# Patient Record
Sex: MALE | Race: WHITE | Employment: OTHER | URBAN - NONMETROPOLITAN AREA
[De-identification: names, ages, dates, MRNs, and addresses within clinical notes are randomized per-mention and may not be internally consistent; named-entity substitution may affect disease eponyms.]

---

## 2017-02-13 DIAGNOSIS — I10 ESSENTIAL HYPERTENSION: ICD-10-CM

## 2017-02-13 RX ORDER — LISINOPRIL 10 MG/1
10 TABLET ORAL DAILY
Qty: 30 TABLET | Refills: 3 | Status: SHIPPED | OUTPATIENT
Start: 2017-02-13 | End: 2017-05-30 | Stop reason: SDUPTHER

## 2017-03-21 ENCOUNTER — OFFICE VISIT (OUTPATIENT)
Dept: CARDIOLOGY | Age: 37
End: 2017-03-21
Payer: MEDICAID

## 2017-03-21 VITALS
HEART RATE: 82 BPM | WEIGHT: 196 LBS | SYSTOLIC BLOOD PRESSURE: 110 MMHG | HEIGHT: 67 IN | DIASTOLIC BLOOD PRESSURE: 72 MMHG | BODY MASS INDEX: 30.76 KG/M2

## 2017-03-21 DIAGNOSIS — I10 ESSENTIAL HYPERTENSION: Primary | ICD-10-CM

## 2017-03-21 DIAGNOSIS — Z72.0 TOBACCO ABUSE: ICD-10-CM

## 2017-03-21 DIAGNOSIS — Z82.49 FAMILY HISTORY OF EARLY CAD: ICD-10-CM

## 2017-03-21 PROCEDURE — 99212 OFFICE O/P EST SF 10 MIN: CPT | Performed by: INTERNAL MEDICINE

## 2017-04-19 ENCOUNTER — HOSPITAL ENCOUNTER (INPATIENT)
Age: 37
LOS: 4 days | Discharge: HOME | DRG: 885 | End: 2017-04-24
Attending: EMERGENCY MEDICINE | Admitting: PSYCHIATRY & NEUROLOGY
Payer: MEDICAID

## 2017-04-19 DIAGNOSIS — R45.851 SUICIDAL IDEATIONS: Primary | ICD-10-CM

## 2017-04-19 DIAGNOSIS — R45.850 HOMICIDAL IDEATIONS: ICD-10-CM

## 2017-04-19 LAB
ACETAMINOPHEN LEVEL: <15 UG/ML
ALBUMIN SERPL-MCNC: 4.3 G/DL (ref 3.5–5.2)
ALP BLD-CCNC: 109 U/L (ref 40–130)
ALT SERPL-CCNC: 26 U/L (ref 5–41)
AMPHETAMINE SCREEN, URINE: NEGATIVE
ANION GAP SERPL CALCULATED.3IONS-SCNC: 15 MMOL/L (ref 7–19)
AST SERPL-CCNC: 32 U/L (ref 5–40)
BACTERIA: NEGATIVE /HPF
BARBITURATE SCREEN URINE: NEGATIVE
BENZODIAZEPINE SCREEN, URINE: POSITIVE
BILIRUB SERPL-MCNC: <0.2 MG/DL (ref 0.2–1.2)
BILIRUBIN URINE: NEGATIVE
BLOOD, URINE: ABNORMAL
BUN BLDV-MCNC: 17 MG/DL (ref 6–20)
CALCIUM SERPL-MCNC: 8.9 MG/DL (ref 8.6–10)
CANNABINOID SCREEN URINE: NEGATIVE
CHLORIDE BLD-SCNC: 105 MMOL/L (ref 98–111)
CLARITY: CLEAR
CO2: 22 MMOL/L (ref 22–29)
COCAINE METABOLITE SCREEN URINE: NEGATIVE
COLOR: YELLOW
CREAT SERPL-MCNC: 1 MG/DL (ref 0.5–1.2)
EPITHELIAL CELLS, UA: 1 /HPF (ref 0–5)
ETHANOL: <10 MG/DL (ref 0–0.08)
GFR NON-AFRICAN AMERICAN: >60
GLOBULIN: 2.7 G/DL
GLUCOSE BLD-MCNC: 111 MG/DL (ref 74–109)
GLUCOSE URINE: NEGATIVE MG/DL
HCT VFR BLD CALC: 44.1 % (ref 42–52)
HEMOGLOBIN: 14.8 G/DL (ref 14–18)
HYALINE CASTS: 2 /HPF (ref 0–8)
KETONES, URINE: NEGATIVE MG/DL
LEUKOCYTE ESTERASE, URINE: NEGATIVE
Lab: ABNORMAL
MCH RBC QN AUTO: 33.3 PG (ref 27–31)
MCHC RBC AUTO-ENTMCNC: 33.6 G/DL (ref 33–37)
MCV RBC AUTO: 99.1 FL (ref 80–94)
NITRITE, URINE: NEGATIVE
OPIATE SCREEN URINE: NEGATIVE
PDW BLD-RTO: 13.5 % (ref 11.5–14.5)
PH UA: 5.5
PLATELET # BLD: 339 K/UL (ref 130–400)
PMV BLD AUTO: 10.3 FL (ref 7.4–10.4)
POTASSIUM SERPL-SCNC: 4.3 MMOL/L (ref 3.5–5)
PROTEIN UA: NEGATIVE MG/DL
RBC # BLD: 4.45 M/UL (ref 4.7–6.1)
RBC UA: 2 /HPF (ref 0–4)
SALICYLATE, SERUM: <3 MG/DL (ref 3–10)
SODIUM BLD-SCNC: 142 MMOL/L (ref 136–145)
SPECIFIC GRAVITY UA: 1.02
TOTAL PROTEIN: 7 G/DL (ref 6.6–8.7)
UROBILINOGEN, URINE: 0.2 E.U./DL
WBC # BLD: 9.8 K/UL (ref 4.8–10.8)
WBC UA: 1 /HPF (ref 0–5)

## 2017-04-19 PROCEDURE — G0480 DRUG TEST DEF 1-7 CLASSES: HCPCS

## 2017-04-19 PROCEDURE — 85027 COMPLETE CBC AUTOMATED: CPT

## 2017-04-19 PROCEDURE — 36415 COLL VENOUS BLD VENIPUNCTURE: CPT

## 2017-04-19 PROCEDURE — 81001 URINALYSIS AUTO W/SCOPE: CPT

## 2017-04-19 PROCEDURE — 80307 DRUG TEST PRSMV CHEM ANLYZR: CPT

## 2017-04-19 PROCEDURE — 80053 COMPREHEN METABOLIC PANEL: CPT

## 2017-04-19 PROCEDURE — 99285 EMERGENCY DEPT VISIT HI MDM: CPT

## 2017-04-20 PROBLEM — F17.200 NICOTINE DEPENDENCE: Status: ACTIVE | Noted: 2017-04-20

## 2017-04-20 PROBLEM — F33.1 MODERATE EPISODE OF RECURRENT MAJOR DEPRESSIVE DISORDER (HCC): Status: ACTIVE | Noted: 2017-04-20

## 2017-04-20 PROCEDURE — 6370000000 HC RX 637 (ALT 250 FOR IP): Performed by: FAMILY MEDICINE

## 2017-04-20 PROCEDURE — 1240000000 HC EMOTIONAL WELLNESS R&B

## 2017-04-20 PROCEDURE — 90792 PSYCH DIAG EVAL W/MED SRVCS: CPT | Performed by: NURSE PRACTITIONER

## 2017-04-20 PROCEDURE — 99284 EMERGENCY DEPT VISIT MOD MDM: CPT | Performed by: EMERGENCY MEDICINE

## 2017-04-20 PROCEDURE — 6370000000 HC RX 637 (ALT 250 FOR IP): Performed by: PSYCHIATRY & NEUROLOGY

## 2017-04-20 PROCEDURE — 6370000000 HC RX 637 (ALT 250 FOR IP): Performed by: NURSE PRACTITIONER

## 2017-04-20 RX ORDER — LANOLIN ALCOHOL/MO/W.PET/CERES
6 CREAM (GRAM) TOPICAL NIGHTLY PRN
Status: DISCONTINUED | OUTPATIENT
Start: 2017-04-20 | End: 2017-04-24 | Stop reason: HOSPADM

## 2017-04-20 RX ORDER — DICYCLOMINE HYDROCHLORIDE 10 MG/1
10 CAPSULE ORAL
Status: DISCONTINUED | OUTPATIENT
Start: 2017-04-20 | End: 2017-04-24 | Stop reason: HOSPADM

## 2017-04-20 RX ORDER — NICOTINE 21 MG/24HR
1 PATCH, TRANSDERMAL 24 HOURS TRANSDERMAL DAILY
Status: DISCONTINUED | OUTPATIENT
Start: 2017-04-20 | End: 2017-04-24 | Stop reason: HOSPADM

## 2017-04-20 RX ORDER — TRAZODONE HYDROCHLORIDE 100 MG/1
100 TABLET ORAL ONCE
Status: COMPLETED | OUTPATIENT
Start: 2017-04-20 | End: 2017-04-20

## 2017-04-20 RX ORDER — LISINOPRIL 10 MG/1
10 TABLET ORAL DAILY
Status: DISCONTINUED | OUTPATIENT
Start: 2017-04-20 | End: 2017-04-24 | Stop reason: HOSPADM

## 2017-04-20 RX ORDER — GABAPENTIN 400 MG/1
400 CAPSULE ORAL 3 TIMES DAILY
Status: DISCONTINUED | OUTPATIENT
Start: 2017-04-20 | End: 2017-04-24 | Stop reason: HOSPADM

## 2017-04-20 RX ORDER — GABAPENTIN 100 MG/1
100 CAPSULE ORAL ONCE
Status: COMPLETED | OUTPATIENT
Start: 2017-04-20 | End: 2017-04-20

## 2017-04-20 RX ORDER — ESCITALOPRAM OXALATE 5 MG/1
5 TABLET ORAL DAILY
Status: DISCONTINUED | OUTPATIENT
Start: 2017-04-20 | End: 2017-04-21

## 2017-04-20 RX ORDER — HYDROXYZINE PAMOATE 25 MG/1
25 CAPSULE ORAL 3 TIMES DAILY PRN
Status: DISCONTINUED | OUTPATIENT
Start: 2017-04-20 | End: 2017-04-24 | Stop reason: HOSPADM

## 2017-04-20 RX ORDER — TRAZODONE HYDROCHLORIDE 50 MG/1
50 TABLET ORAL NIGHTLY
Status: DISCONTINUED | OUTPATIENT
Start: 2017-04-20 | End: 2017-04-20

## 2017-04-20 RX ADMIN — GABAPENTIN 400 MG: 400 CAPSULE ORAL at 10:50

## 2017-04-20 RX ADMIN — GABAPENTIN 400 MG: 400 CAPSULE ORAL at 21:30

## 2017-04-20 RX ADMIN — DICYCLOMINE HYDROCHLORIDE 10 MG: 10 CAPSULE ORAL at 15:42

## 2017-04-20 RX ADMIN — LISINOPRIL 10 MG: 10 TABLET ORAL at 10:50

## 2017-04-20 RX ADMIN — GABAPENTIN 100 MG: 100 CAPSULE ORAL at 02:54

## 2017-04-20 RX ADMIN — ESCITALOPRAM OXALATE 5 MG: 5 TABLET, FILM COATED ORAL at 10:50

## 2017-04-20 RX ADMIN — TRAZODONE HYDROCHLORIDE 100 MG: 100 TABLET ORAL at 02:54

## 2017-04-20 RX ADMIN — DICYCLOMINE HYDROCHLORIDE 10 MG: 10 CAPSULE ORAL at 10:50

## 2017-04-20 RX ADMIN — GABAPENTIN 400 MG: 400 CAPSULE ORAL at 14:12

## 2017-04-20 RX ADMIN — MELATONIN 3 MG ORAL TABLET 6 MG: 3 TABLET ORAL at 21:30

## 2017-04-20 RX ADMIN — HYDROXYZINE PAMOATE 25 MG: 25 CAPSULE ORAL at 10:50

## 2017-04-20 ASSESSMENT — ENCOUNTER SYMPTOMS
CONSTIPATION: 0
ABDOMINAL PAIN: 0
ABDOMINAL DISTENTION: 0
CHEST TIGHTNESS: 0
COLOR CHANGE: 0
PHOTOPHOBIA: 0
SORE THROAT: 0
WHEEZING: 0
EYE PAIN: 0
VOMITING: 0
SHORTNESS OF BREATH: 0
BACK PAIN: 0
DIARRHEA: 0
NAUSEA: 0
COUGH: 0
TROUBLE SWALLOWING: 0
EYE REDNESS: 0
SINUS PRESSURE: 0

## 2017-04-20 ASSESSMENT — PATIENT HEALTH QUESTIONNAIRE - PHQ9: SUM OF ALL RESPONSES TO PHQ QUESTIONS 1-9: 9

## 2017-04-20 ASSESSMENT — SLEEP AND FATIGUE QUESTIONNAIRES
DO YOU HAVE DIFFICULTY SLEEPING: NO
DO YOU USE A SLEEP AID: NO

## 2017-04-20 ASSESSMENT — LIFESTYLE VARIABLES: HISTORY_ALCOHOL_USE: YES

## 2017-04-21 LAB
HBA1C MFR BLD: 5.2 %
TSH SERPL DL<=0.05 MIU/L-ACNC: 1.2 UIU/ML (ref 0.27–4.2)
VITAMIN B-12: 273 PG/ML (ref 211–946)
VITAMIN D 25-HYDROXY: 19.2 NG/ML

## 2017-04-21 PROCEDURE — 36415 COLL VENOUS BLD VENIPUNCTURE: CPT

## 2017-04-21 PROCEDURE — 99231 SBSQ HOSP IP/OBS SF/LOW 25: CPT | Performed by: NURSE PRACTITIONER

## 2017-04-21 PROCEDURE — 82607 VITAMIN B-12: CPT

## 2017-04-21 PROCEDURE — 82306 VITAMIN D 25 HYDROXY: CPT

## 2017-04-21 PROCEDURE — 6370000000 HC RX 637 (ALT 250 FOR IP): Performed by: FAMILY MEDICINE

## 2017-04-21 PROCEDURE — 6370000000 HC RX 637 (ALT 250 FOR IP): Performed by: NURSE PRACTITIONER

## 2017-04-21 PROCEDURE — 1240000000 HC EMOTIONAL WELLNESS R&B

## 2017-04-21 PROCEDURE — 83036 HEMOGLOBIN GLYCOSYLATED A1C: CPT

## 2017-04-21 PROCEDURE — 84443 ASSAY THYROID STIM HORMONE: CPT

## 2017-04-21 RX ORDER — CHOLECALCIFEROL (VITAMIN D3) 125 MCG
500 CAPSULE ORAL DAILY
Status: DISCONTINUED | OUTPATIENT
Start: 2017-04-22 | End: 2017-04-24 | Stop reason: HOSPADM

## 2017-04-21 RX ORDER — IBUPROFEN 400 MG/1
400 TABLET ORAL EVERY 6 HOURS PRN
Status: DISCONTINUED | OUTPATIENT
Start: 2017-04-21 | End: 2017-04-24 | Stop reason: HOSPADM

## 2017-04-21 RX ORDER — ERGOCALCIFEROL 1.25 MG/1
50000 CAPSULE ORAL WEEKLY
Status: DISCONTINUED | OUTPATIENT
Start: 2017-04-22 | End: 2017-04-24 | Stop reason: HOSPADM

## 2017-04-21 RX ORDER — ESCITALOPRAM OXALATE 10 MG/1
10 TABLET ORAL DAILY
Status: DISCONTINUED | OUTPATIENT
Start: 2017-04-22 | End: 2017-04-24 | Stop reason: HOSPADM

## 2017-04-21 RX ORDER — LIDOCAINE 50 MG/G
1 PATCH TOPICAL DAILY
Status: DISCONTINUED | OUTPATIENT
Start: 2017-04-21 | End: 2017-04-24 | Stop reason: HOSPADM

## 2017-04-21 RX ADMIN — IBUPROFEN 400 MG: 400 TABLET, FILM COATED ORAL at 21:21

## 2017-04-21 RX ADMIN — GABAPENTIN 400 MG: 400 CAPSULE ORAL at 09:04

## 2017-04-21 RX ADMIN — IBUPROFEN 400 MG: 400 TABLET, FILM COATED ORAL at 13:32

## 2017-04-21 RX ADMIN — DICYCLOMINE HYDROCHLORIDE 10 MG: 10 CAPSULE ORAL at 06:47

## 2017-04-21 RX ADMIN — DICYCLOMINE HYDROCHLORIDE 10 MG: 10 CAPSULE ORAL at 11:03

## 2017-04-21 RX ADMIN — GABAPENTIN 400 MG: 400 CAPSULE ORAL at 21:09

## 2017-04-21 RX ADMIN — ESCITALOPRAM OXALATE 5 MG: 5 TABLET, FILM COATED ORAL at 09:04

## 2017-04-21 RX ADMIN — MELATONIN 3 MG ORAL TABLET 6 MG: 3 TABLET ORAL at 21:09

## 2017-04-21 RX ADMIN — DICYCLOMINE HYDROCHLORIDE 10 MG: 10 CAPSULE ORAL at 16:11

## 2017-04-21 RX ADMIN — LISINOPRIL 10 MG: 10 TABLET ORAL at 09:04

## 2017-04-21 RX ADMIN — GABAPENTIN 400 MG: 400 CAPSULE ORAL at 12:52

## 2017-04-21 ASSESSMENT — PAIN SCALES - GENERAL
PAINLEVEL_OUTOF10: 6
PAINLEVEL_OUTOF10: 10

## 2017-04-22 PROCEDURE — 1240000000 HC EMOTIONAL WELLNESS R&B

## 2017-04-22 PROCEDURE — 99221 1ST HOSP IP/OBS SF/LOW 40: CPT | Performed by: PSYCHIATRY & NEUROLOGY

## 2017-04-22 PROCEDURE — 6370000000 HC RX 637 (ALT 250 FOR IP): Performed by: NURSE PRACTITIONER

## 2017-04-22 PROCEDURE — 6370000000 HC RX 637 (ALT 250 FOR IP): Performed by: FAMILY MEDICINE

## 2017-04-22 RX ADMIN — DICYCLOMINE HYDROCHLORIDE 10 MG: 10 CAPSULE ORAL at 12:12

## 2017-04-22 RX ADMIN — IBUPROFEN 400 MG: 400 TABLET, FILM COATED ORAL at 12:12

## 2017-04-22 RX ADMIN — GABAPENTIN 400 MG: 400 CAPSULE ORAL at 09:57

## 2017-04-22 RX ADMIN — MELATONIN 3 MG ORAL TABLET 6 MG: 3 TABLET ORAL at 21:24

## 2017-04-22 RX ADMIN — DICYCLOMINE HYDROCHLORIDE 10 MG: 10 CAPSULE ORAL at 16:35

## 2017-04-22 RX ADMIN — GABAPENTIN 400 MG: 400 CAPSULE ORAL at 21:24

## 2017-04-22 RX ADMIN — DICYCLOMINE HYDROCHLORIDE 10 MG: 10 CAPSULE ORAL at 06:22

## 2017-04-22 RX ADMIN — LISINOPRIL 10 MG: 10 TABLET ORAL at 09:58

## 2017-04-22 RX ADMIN — CYANOCOBALAMIN TAB 500 MCG 500 MCG: 500 TAB at 09:58

## 2017-04-22 RX ADMIN — ESCITALOPRAM OXALATE 10 MG: 10 TABLET ORAL at 09:58

## 2017-04-22 RX ADMIN — GABAPENTIN 400 MG: 400 CAPSULE ORAL at 13:19

## 2017-04-22 RX ADMIN — ERGOCALCIFEROL 50000 UNITS: 1.25 CAPSULE ORAL at 09:58

## 2017-04-22 RX ADMIN — IBUPROFEN 400 MG: 400 TABLET, FILM COATED ORAL at 21:28

## 2017-04-22 ASSESSMENT — PAIN SCALES - GENERAL
PAINLEVEL_OUTOF10: 8
PAINLEVEL_OUTOF10: 9

## 2017-04-23 PROCEDURE — 1240000000 HC EMOTIONAL WELLNESS R&B

## 2017-04-23 PROCEDURE — 6370000000 HC RX 637 (ALT 250 FOR IP): Performed by: NURSE PRACTITIONER

## 2017-04-23 PROCEDURE — 6370000000 HC RX 637 (ALT 250 FOR IP): Performed by: FAMILY MEDICINE

## 2017-04-23 RX ADMIN — ESCITALOPRAM OXALATE 10 MG: 10 TABLET ORAL at 09:42

## 2017-04-23 RX ADMIN — DICYCLOMINE HYDROCHLORIDE 10 MG: 10 CAPSULE ORAL at 11:53

## 2017-04-23 RX ADMIN — DICYCLOMINE HYDROCHLORIDE 10 MG: 10 CAPSULE ORAL at 07:26

## 2017-04-23 RX ADMIN — LISINOPRIL 10 MG: 10 TABLET ORAL at 09:42

## 2017-04-23 RX ADMIN — GABAPENTIN 400 MG: 400 CAPSULE ORAL at 14:11

## 2017-04-23 RX ADMIN — GABAPENTIN 400 MG: 400 CAPSULE ORAL at 09:44

## 2017-04-23 RX ADMIN — GABAPENTIN 400 MG: 400 CAPSULE ORAL at 21:42

## 2017-04-23 RX ADMIN — IBUPROFEN 400 MG: 400 TABLET, FILM COATED ORAL at 10:11

## 2017-04-23 RX ADMIN — IBUPROFEN 400 MG: 400 TABLET, FILM COATED ORAL at 21:42

## 2017-04-23 RX ADMIN — CYANOCOBALAMIN TAB 500 MCG 500 MCG: 500 TAB at 09:43

## 2017-04-23 RX ADMIN — MELATONIN 3 MG ORAL TABLET 6 MG: 3 TABLET ORAL at 21:42

## 2017-04-23 RX ADMIN — DICYCLOMINE HYDROCHLORIDE 10 MG: 10 CAPSULE ORAL at 16:05

## 2017-04-23 ASSESSMENT — PAIN SCALES - GENERAL
PAINLEVEL_OUTOF10: 5
PAINLEVEL_OUTOF10: 7

## 2017-04-24 VITALS
RESPIRATION RATE: 18 BRPM | HEIGHT: 67 IN | DIASTOLIC BLOOD PRESSURE: 89 MMHG | SYSTOLIC BLOOD PRESSURE: 132 MMHG | OXYGEN SATURATION: 100 % | WEIGHT: 195 LBS | TEMPERATURE: 98 F | HEART RATE: 78 BPM | BODY MASS INDEX: 30.61 KG/M2

## 2017-04-24 PROCEDURE — 99238 HOSP IP/OBS DSCHRG MGMT 30/<: CPT | Performed by: NURSE PRACTITIONER

## 2017-04-24 PROCEDURE — 6370000000 HC RX 637 (ALT 250 FOR IP): Performed by: FAMILY MEDICINE

## 2017-04-24 PROCEDURE — 6370000000 HC RX 637 (ALT 250 FOR IP): Performed by: NURSE PRACTITIONER

## 2017-04-24 PROCEDURE — 5130000000 HC BRIDGE APPOINTMENT

## 2017-04-24 RX ORDER — ERGOCALCIFEROL (VITAMIN D2) 1250 MCG
50000 CAPSULE ORAL WEEKLY
Qty: 10 CAPSULE | Refills: 0 | Status: SHIPPED | OUTPATIENT
Start: 2017-04-24 | End: 2018-03-07 | Stop reason: SDUPTHER

## 2017-04-24 RX ORDER — ESCITALOPRAM OXALATE 10 MG/1
10 TABLET ORAL DAILY
Qty: 30 TABLET | Refills: 0 | Status: SHIPPED | OUTPATIENT
Start: 2017-04-24 | End: 2018-04-09

## 2017-04-24 RX ORDER — HYDROXYZINE PAMOATE 25 MG/1
25 CAPSULE ORAL 3 TIMES DAILY PRN
Qty: 42 CAPSULE | Refills: 0 | Status: SHIPPED | OUTPATIENT
Start: 2017-04-24 | End: 2017-05-08

## 2017-04-24 RX ADMIN — ESCITALOPRAM OXALATE 10 MG: 10 TABLET ORAL at 08:39

## 2017-04-24 RX ADMIN — LISINOPRIL 10 MG: 10 TABLET ORAL at 08:39

## 2017-04-24 RX ADMIN — DICYCLOMINE HYDROCHLORIDE 10 MG: 10 CAPSULE ORAL at 13:01

## 2017-04-24 RX ADMIN — CYANOCOBALAMIN TAB 500 MCG 500 MCG: 500 TAB at 08:40

## 2017-04-24 RX ADMIN — GABAPENTIN 400 MG: 400 CAPSULE ORAL at 13:02

## 2017-04-24 RX ADMIN — DICYCLOMINE HYDROCHLORIDE 10 MG: 10 CAPSULE ORAL at 07:22

## 2017-04-24 RX ADMIN — GABAPENTIN 400 MG: 400 CAPSULE ORAL at 08:40

## 2017-05-30 DIAGNOSIS — I10 ESSENTIAL HYPERTENSION: ICD-10-CM

## 2017-05-31 RX ORDER — LISINOPRIL 10 MG/1
TABLET ORAL
Qty: 30 TABLET | Refills: 3 | Status: SHIPPED | OUTPATIENT
Start: 2017-05-31 | End: 2017-10-06 | Stop reason: SDUPTHER

## 2017-07-01 ENCOUNTER — APPOINTMENT (OUTPATIENT)
Dept: GENERAL RADIOLOGY | Age: 37
End: 2017-07-01
Payer: MEDICAID

## 2017-07-01 ENCOUNTER — HOSPITAL ENCOUNTER (EMERGENCY)
Age: 37
Discharge: HOME | End: 2017-07-01
Payer: MEDICAID

## 2017-07-01 VITALS
OXYGEN SATURATION: 98 % | DIASTOLIC BLOOD PRESSURE: 83 MMHG | BODY MASS INDEX: 30.05 KG/M2 | TEMPERATURE: 98.8 F | HEART RATE: 94 BPM | HEIGHT: 66 IN | RESPIRATION RATE: 18 BRPM | SYSTOLIC BLOOD PRESSURE: 142 MMHG | WEIGHT: 187 LBS

## 2017-07-01 DIAGNOSIS — S41.111A LACERATION OF UPPER LIMB, RIGHT, INITIAL ENCOUNTER: Primary | ICD-10-CM

## 2017-07-01 PROCEDURE — 90471 IMMUNIZATION ADMIN: CPT | Performed by: NURSE PRACTITIONER

## 2017-07-01 PROCEDURE — 6360000002 HC RX W HCPCS: Performed by: NURSE PRACTITIONER

## 2017-07-01 PROCEDURE — 73080 X-RAY EXAM OF ELBOW: CPT

## 2017-07-01 PROCEDURE — 12006 RPR S/N/A/GEN/TRK20.1-30.0CM: CPT | Performed by: NURSE PRACTITIONER

## 2017-07-01 PROCEDURE — 12006 RPR S/N/A/GEN/TRK20.1-30.0CM: CPT

## 2017-07-01 PROCEDURE — 99282 EMERGENCY DEPT VISIT SF MDM: CPT

## 2017-07-01 PROCEDURE — 90715 TDAP VACCINE 7 YRS/> IM: CPT | Performed by: NURSE PRACTITIONER

## 2017-07-01 RX ORDER — HYDROCODONE BITARTRATE AND ACETAMINOPHEN 5; 325 MG/1; MG/1
1 TABLET ORAL EVERY 6 HOURS PRN
COMMUNITY
End: 2019-01-21 | Stop reason: SDUPTHER

## 2017-07-01 RX ORDER — LIDOCAINE HYDROCHLORIDE 10 MG/ML
5 INJECTION, SOLUTION INFILTRATION; PERINEURAL ONCE
Status: DISCONTINUED | OUTPATIENT
Start: 2017-07-01 | End: 2017-07-01 | Stop reason: HOSPADM

## 2017-07-01 RX ADMIN — TETANUS TOXOID, REDUCED DIPHTHERIA TOXOID AND ACELLULAR PERTUSSIS VACCINE, ADSORBED 0.5 ML: 5; 2.5; 8; 8; 2.5 SUSPENSION INTRAMUSCULAR at 17:49

## 2017-07-01 ASSESSMENT — PAIN SCALES - GENERAL: PAINLEVEL_OUTOF10: 10

## 2017-07-01 ASSESSMENT — PAIN DESCRIPTION - ORIENTATION: ORIENTATION: RIGHT

## 2017-07-01 ASSESSMENT — PAIN DESCRIPTION - PAIN TYPE: TYPE: ACUTE PAIN

## 2017-07-01 ASSESSMENT — PAIN DESCRIPTION - LOCATION: LOCATION: ELBOW

## 2017-07-19 ENCOUNTER — APPOINTMENT (OUTPATIENT)
Dept: GENERAL RADIOLOGY | Age: 37
End: 2017-07-19
Payer: MEDICAID

## 2017-07-19 ENCOUNTER — HOSPITAL ENCOUNTER (EMERGENCY)
Age: 37
Discharge: HOME | End: 2017-07-19
Payer: MEDICAID

## 2017-07-19 VITALS
SYSTOLIC BLOOD PRESSURE: 134 MMHG | WEIGHT: 186 LBS | HEART RATE: 90 BPM | BODY MASS INDEX: 29.89 KG/M2 | RESPIRATION RATE: 20 BRPM | DIASTOLIC BLOOD PRESSURE: 83 MMHG | TEMPERATURE: 96.4 F | HEIGHT: 66 IN | OXYGEN SATURATION: 98 %

## 2017-07-19 DIAGNOSIS — M54.6 ACUTE BILATERAL THORACIC BACK PAIN: Primary | ICD-10-CM
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
## 2017-07-19 PROCEDURE — 96372 THER/PROPH/DIAG INJ SC/IM: CPT

## 2017-07-19 PROCEDURE — 72072 X-RAY EXAM THORAC SPINE 3VWS: CPT

## 2017-07-19 PROCEDURE — 99283 EMERGENCY DEPT VISIT LOW MDM: CPT

## 2017-07-19 PROCEDURE — 6370000000 HC RX 637 (ALT 250 FOR IP): Performed by: NURSE PRACTITIONER

## 2017-07-19 PROCEDURE — 99282 EMERGENCY DEPT VISIT SF MDM: CPT | Performed by: NURSE PRACTITIONER

## 2017-07-19 PROCEDURE — 6360000002 HC RX W HCPCS: Performed by: NURSE PRACTITIONER

## 2017-07-19 RX ORDER — HYDROCODONE BITARTRATE AND ACETAMINOPHEN 7.5; 325 MG/1; MG/1
1 TABLET ORAL ONCE
Status: COMPLETED | OUTPATIENT
Start: 2017-07-19 | End: 2017-07-19

## 2017-07-19 RX ORDER — ORPHENADRINE CITRATE 30 MG/ML
60 INJECTION INTRAMUSCULAR; INTRAVENOUS ONCE
Status: COMPLETED | OUTPATIENT
Start: 2017-07-19 | End: 2017-07-19

## 2017-07-19 RX ADMIN — ORPHENADRINE CITRATE 60 MG: 30 INJECTION INTRAMUSCULAR; INTRAVENOUS at 15:48

## 2017-07-19 RX ADMIN — HYDROCODONE BITARTRATE AND ACETAMINOPHEN 1 TABLET: 7.5; 325 TABLET ORAL at 15:48

## 2017-07-19 ASSESSMENT — ENCOUNTER SYMPTOMS: BACK PAIN: 1

## 2017-07-19 ASSESSMENT — PAIN SCALES - GENERAL
PAINLEVEL_OUTOF10: 10
PAINLEVEL_OUTOF10: 10

## 2017-08-24 ENCOUNTER — HOSPITAL ENCOUNTER (EMERGENCY)
Facility: HOSPITAL | Age: 37
Discharge: HOME | End: 2017-08-24
Attending: FAMILY MEDICINE | Admitting: FAMILY MEDICINE

## 2017-08-24 VITALS
SYSTOLIC BLOOD PRESSURE: 147 MMHG | DIASTOLIC BLOOD PRESSURE: 90 MMHG | WEIGHT: 191 LBS | BODY MASS INDEX: 30.7 KG/M2 | OXYGEN SATURATION: 95 % | RESPIRATION RATE: 18 BRPM | HEART RATE: 109 BPM | HEIGHT: 66 IN | TEMPERATURE: 99.2 F

## 2017-08-24 DIAGNOSIS — M70.31 BURSITIS OF RIGHT ELBOW: Primary | ICD-10-CM

## 2017-08-24 PROCEDURE — 99282 EMERGENCY DEPT VISIT SF MDM: CPT

## 2017-08-24 RX ORDER — DOXYCYCLINE 100 MG/1
100 CAPSULE ORAL 2 TIMES DAILY
Qty: 14 CAPSULE | Refills: 0 | Status: SHIPPED | OUTPATIENT
Start: 2017-08-24 | End: 2017-08-31

## 2017-08-24 RX ORDER — METHYLPREDNISOLONE 4 MG/1
TABLET ORAL
Qty: 21 TABLET | Refills: 0 | OUTPATIENT
Start: 2017-08-24 | End: 2019-03-05 | Stop reason: HOSPADM

## 2017-08-24 RX ORDER — ONDANSETRON 4 MG/1
4 TABLET, ORALLY DISINTEGRATING ORAL 4 TIMES DAILY PRN
Qty: 20 TABLET | Refills: 0 | Status: SHIPPED | OUTPATIENT
Start: 2017-08-24 | End: 2020-03-10

## 2017-08-24 NOTE — ED PROVIDER NOTES
Subjective   Patient is a 37 y.o. male presenting with upper extremity pain.   Upper Extremity Issue   Location:  Elbow  Injury: yes    Time since incident:  2 months  Mechanism of injury comment:  Lac  Pain details:     Quality:  Dull    Severity:  Mild    Onset quality:  Unable to specify    Duration:  7 days    Timing:  Intermittent    Progression:  Worsening  Dislocation: no    Foreign body present: possibly glass.  Tetanus status:  Up to date  Prior injury to area:  Yes  Relieved by:  None tried  Worsened by:  Movement  Associated symptoms: no back pain, no fatigue, no fever and no neck pain        Review of Systems   Constitutional: Negative for activity change, appetite change, chills, diaphoresis, fatigue and fever.   HENT: Negative for congestion, ear pain, nosebleeds, postnasal drip and sinus pressure.    Eyes: Negative for photophobia and pain.   Respiratory: Negative for cough, chest tightness, shortness of breath and wheezing.    Cardiovascular: Negative for chest pain.   Gastrointestinal: Negative for abdominal pain, blood in stool, diarrhea, nausea and vomiting.   Endocrine: Negative for cold intolerance and heat intolerance.   Genitourinary: Negative for difficulty urinating, dysuria and flank pain.   Musculoskeletal: Negative for arthralgias, back pain, neck pain and neck stiffness.   Skin: Negative for color change and rash.   Neurological: Negative for dizziness, weakness and headaches.   Hematological: Negative for adenopathy. Does not bruise/bleed easily.   Psychiatric/Behavioral: Negative for confusion and sleep disturbance. The patient is not nervous/anxious.        Past Medical History:   Diagnosis Date   • Back pain    • Hypertension    • Neuropathy        Allergies   Allergen Reactions   • Darvon [Propoxyphene]    • Dilaudid [Hydromorphone Hcl]    • Flexeril [Cyclobenzaprine]    • Naproxen    • Nubain [Nalbuphine Hcl]    • Toradol [Ketorolac Tromethamine]        Past Surgical History:    Procedure Laterality Date   • BACK SURGERY         History reviewed. No pertinent family history.    Social History     Social History   • Marital status: Single     Spouse name: N/A   • Number of children: N/A   • Years of education: N/A     Social History Main Topics   • Smoking status: Current Every Day Smoker     Packs/day: 1.00   • Smokeless tobacco: None   • Alcohol use No   • Drug use: No   • Sexual activity: Not Asked     Other Topics Concern   • None     Social History Narrative           Objective   Physical Exam   Constitutional: He is oriented to person, place, and time. He appears well-developed and well-nourished. No distress.   HENT:   Head: Atraumatic.   Nose: Nose normal.   Mouth/Throat: Oropharynx is clear and moist.   Eyes: Conjunctivae are normal. Pupils are equal, round, and reactive to light. No scleral icterus.   Neck: Normal range of motion. Neck supple. No JVD present. No thyromegaly present.   Cardiovascular: Normal rate, regular rhythm, normal heart sounds and intact distal pulses.    No murmur heard.  Pulmonary/Chest: Effort normal and breath sounds normal. No respiratory distress. He has no wheezes. He has no rales. He exhibits no tenderness.   Abdominal: Soft. Bowel sounds are normal. He exhibits no distension and no mass. There is no tenderness. There is no rebound and no guarding.   Musculoskeletal: Normal range of motion. He exhibits no edema.        Right elbow: He exhibits swelling. Tenderness found.        Arms:  Lymphadenopathy:     He has no cervical adenopathy.   Neurological: He is alert and oriented to person, place, and time. He has normal reflexes. No cranial nerve deficit. Coordination normal.   Skin: Skin is warm and dry. No rash noted. He is not diaphoretic. No erythema. No pallor.   Psychiatric: He has a normal mood and affect. His behavior is normal. Judgment and thought content normal.   Nursing note and vitals reviewed.      Procedures         ED Course  ED Course                   MDM  Number of Diagnoses or Management Options  Bursitis of right elbow: established and worsening     Amount and/or Complexity of Data Reviewed  Clinical lab tests: ordered and reviewed  Decide to obtain previous medical records or to obtain history from someone other than the patient: yes  Review and summarize past medical records: yes    Risk of Complications, Morbidity, and/or Mortality  Presenting problems: minimal  Management options: minimal    Patient Progress  Patient progress: stable      Final diagnoses:   Bursitis of right elbow            Ivan Hennessy MD  08/25/17 6115

## 2017-10-06 DIAGNOSIS — I10 ESSENTIAL HYPERTENSION: ICD-10-CM

## 2017-10-06 RX ORDER — LISINOPRIL 10 MG/1
TABLET ORAL
Qty: 30 TABLET | Refills: 5 | Status: SHIPPED | OUTPATIENT
Start: 2017-10-06 | End: 2018-04-09 | Stop reason: SDUPTHER

## 2017-12-07 ENCOUNTER — OFFICE VISIT (OUTPATIENT)
Dept: CARDIOLOGY | Age: 37
End: 2017-12-07
Payer: MEDICAID

## 2017-12-07 VITALS
HEART RATE: 88 BPM | HEIGHT: 66 IN | BODY MASS INDEX: 31.98 KG/M2 | WEIGHT: 199 LBS | SYSTOLIC BLOOD PRESSURE: 134 MMHG | DIASTOLIC BLOOD PRESSURE: 72 MMHG

## 2017-12-07 DIAGNOSIS — Z72.0 TOBACCO ABUSE: ICD-10-CM

## 2017-12-07 DIAGNOSIS — I10 ESSENTIAL HYPERTENSION: Primary | ICD-10-CM

## 2017-12-07 DIAGNOSIS — Z82.49 FAMILY HISTORY OF EARLY CAD: ICD-10-CM

## 2017-12-07 PROCEDURE — 99213 OFFICE O/P EST LOW 20 MIN: CPT | Performed by: INTERNAL MEDICINE

## 2017-12-07 PROCEDURE — G8427 DOCREV CUR MEDS BY ELIG CLIN: HCPCS | Performed by: INTERNAL MEDICINE

## 2017-12-07 PROCEDURE — 4004F PT TOBACCO SCREEN RCVD TLK: CPT | Performed by: INTERNAL MEDICINE

## 2017-12-07 PROCEDURE — G8417 CALC BMI ABV UP PARAM F/U: HCPCS | Performed by: INTERNAL MEDICINE

## 2017-12-07 PROCEDURE — G8484 FLU IMMUNIZE NO ADMIN: HCPCS | Performed by: INTERNAL MEDICINE

## 2017-12-07 NOTE — PROGRESS NOTES
[address] [place] [address] Patient Office Visit    [facility] [place]  [id number]  Phone: [phone]  Fax: [phone]        12/7/2017    Chief Complaint / Reason for the Visit   Follow up of:  HTN and Family history of cAD and Tobacco user      Specialty Problems        Cardiology Problems    HTN (hypertension)              Current Status Today According to the patient: \"Im great\"    Subjective:  Mr. Patrick Hogan is generally feeling stable. Mr. Patrick Hogan has the following cardiac complaints / symptoms today:    1. Tobacco user,     2. Family history of CAd, Is stable on current medications      3. HTn, Is stable on current medications        Patrick Hogan is a 40 y.o. male [facility] the [facility]:    Patient Active Problem List    Diagnosis Date Noted    Moderate episode of recurrent major depressive disorder ([facility] [address] 04/20/2017    Nicotine dependence 04/20/2017    Suicidal ideations     Family history of early CAD 11/01/2016    HTN (hypertension) 11/01/2016    Chest pressure 11/01/2016    Tobacco abuse      Current Outpatient Prescriptions   Medication Sig Dispense Refill    lisinopril (PRINIVIL;ZESTRIL) 10 MG tablet TAKE 1 TABLET BY MOUTH DAILY 30 tablet 5    HYDROcodone-acetaminophen (NORCO) 5-325 MG per tablet Take 1 tablet by mouth every 6 hours as needed for Pain .  escitalopram (LEXAPRO) 10 MG tablet Take 1 tablet by mouth daily 30 tablet 0    vitamin B-12 500 MCG tablet Take 1 tablet by mouth daily 30 tablet 0    vitamin D (ERGOCALCIFEROL) 79084 UNITS capsule Take 1 capsule by mouth once a week 10 capsule 0     No current facility-administered medications for this visit. Allergies: Flexeril [cyclobenzaprine]; Toradol [ketorolac tromethamine];  Demerol hcl [meperidine]; and Other  Past Medical History:   Diagnosis Date    Back pain     Chest pain     Tobacco abuse      Past Surgical History:   Procedure Laterality Date    BACK SURGERY      x 3      Family History   Problem Relation Age of Onset    Heart Disease Mother     Hypertension Mother     Cancer Father      prostate     Social History   Substance Use Topics    Smoking status: Current Every Day Smoker    Smokeless tobacco: Never Used    Alcohol use No          Review of Systems:    General:      Complaint / Symptom Yes / No / Description if Yes       Fatigue No   Weight gain No   Insomnia No       Respiratory:        Complaint / Symptom Yes / No / Description if Yes       Cough No   Horseness No       Cardiovascular:    Complaint / Symptom Yes / No / Description if Yes       Chest Pain No   Shortness of Air / Orthopnea No   Presyncope / Syncope No   Palpitations No         Objective:    /72   Pulse 88   Ht 5' 6\" (1.676 m)   Wt 199 lb (90.3 kg)   BMI 32.12 kg/m²     GENERAL - well developed and well nourished, conversant  HEENT   PERRLA, Hearing appears normal  NECK - no thyromegaly, no JVD, trachea is in the midline  CARDIOVASCULAR  PMI is in the mid line clavicular position, Normal S1 and S2 with a grade 1/6 systolic murmur. No S3 or S4    PULMONARY  no respiratory distress. No wheezes or rales.  Lungs are clear to ausculation   ABDOMEN   soft, non tender, no rebound  MUSCULOSKELETAL   range of motion of the upper and lower extermites appears normal and equal and is without pain   EXTREMITIES - no significant edema   NEUROLOGIC  gait and station are normal  SKIN - turgor is normal  PSYCHIATRIC - normal mood and affect, alert and orientated x 3,      ASSESSMENT:    ALL THE CARDIOLOGY PROBLEMS ARE LISTED ABOVE; HOWEVER, THE FOLLOWING SPECIFIC CARDIAC PROBLEMS / CONDITIONS WERE ADDRESSED AND TREATED DURING THE OFFICE VISIT TODAY:                                                                                            MEDICAL DECISION MAKING             Cardiac Specific Problem / Diagnosis  Discussion and Data Reviewed Diagnostic Procedures Ordered Management Options Selected           1. Family history of CAD  show no change   Review and summation of old records:    10/4/2016  EST negative for myocardial ischemia No Continue current medications:     Yes           2. HTN  show no change   Patient has a history of these risk factors, which are managed medically, and are on current oral therapy  I personally addressed, counselled and educated the patient on this problem / risk factor. I will personally continue and manage prescribed medications and monitor the course of the therapy. No Continue current medications:    {Yes           3. Tobacco user  show no change   I personally addressed, counselled and educated the patient on this problem / risk factor. No Continue current medications:       Yes         Discussed with patient. Follow Up Visit Scheduled for:  6 month(s)    I greatly appreciate the opportunity to meet Dianna Escobar and your confidence in allowing me to participate in his cardiovascular care. [facility] [place], [place] am scribing for and in the presence of DONA Max MD,FACC. Rachell Wylie MA     12:18 PM  IDONA MD, [facility], personally performed the services described in this documentation as scribed by Rachell Wylie in my presence, and it is both accurate and complete. Electronically signed by Merlin Sal.  Shanell Max MD, [facility]    12/7/17 12:22 PM

## 2018-03-07 ENCOUNTER — OFFICE VISIT (OUTPATIENT)
Dept: PRIMARY CARE CLINIC | Age: 38
End: 2018-03-07
Payer: MEDICARE

## 2018-03-07 VITALS
BODY MASS INDEX: 30.37 KG/M2 | HEIGHT: 66 IN | SYSTOLIC BLOOD PRESSURE: 138 MMHG | TEMPERATURE: 98 F | OXYGEN SATURATION: 97 % | HEART RATE: 108 BPM | WEIGHT: 189 LBS | RESPIRATION RATE: 18 BRPM | DIASTOLIC BLOOD PRESSURE: 88 MMHG

## 2018-03-07 DIAGNOSIS — Z87.891 PERSONAL HISTORY OF TOBACCO USE, PRESENTING HAZARDS TO HEALTH: ICD-10-CM

## 2018-03-07 DIAGNOSIS — F31.9 BIPOLAR DEPRESSION (HCC): ICD-10-CM

## 2018-03-07 DIAGNOSIS — Z00.00 ROUTINE GENERAL MEDICAL EXAMINATION AT A HEALTH CARE FACILITY: Primary | ICD-10-CM

## 2018-03-07 DIAGNOSIS — F17.200 TOBACCO USE DISORDER: ICD-10-CM

## 2018-03-07 DIAGNOSIS — Z87.891 PERSONAL HISTORY OF NICOTINE DEPENDENCE: ICD-10-CM

## 2018-03-07 DIAGNOSIS — G25.81 RESTLESS LEG SYNDROME: ICD-10-CM

## 2018-03-07 DIAGNOSIS — I10 ESSENTIAL HYPERTENSION: ICD-10-CM

## 2018-03-07 DIAGNOSIS — D17.1 LIPOMA OF SKIN OF ABDOMEN: ICD-10-CM

## 2018-03-07 PROBLEM — F33.1 MODERATE EPISODE OF RECURRENT MAJOR DEPRESSIVE DISORDER (HCC): Status: RESOLVED | Noted: 2017-04-20 | Resolved: 2018-03-07

## 2018-03-07 PROCEDURE — 99406 BEHAV CHNG SMOKING 3-10 MIN: CPT | Performed by: FAMILY MEDICINE

## 2018-03-07 PROCEDURE — G8417 CALC BMI ABV UP PARAM F/U: HCPCS | Performed by: FAMILY MEDICINE

## 2018-03-07 PROCEDURE — G0438 PPPS, INITIAL VISIT: HCPCS | Performed by: FAMILY MEDICINE

## 2018-03-07 PROCEDURE — 99204 OFFICE O/P NEW MOD 45 MIN: CPT | Performed by: FAMILY MEDICINE

## 2018-03-07 PROCEDURE — G8484 FLU IMMUNIZE NO ADMIN: HCPCS | Performed by: FAMILY MEDICINE

## 2018-03-07 PROCEDURE — G8427 DOCREV CUR MEDS BY ELIG CLIN: HCPCS | Performed by: FAMILY MEDICINE

## 2018-03-07 PROCEDURE — 4004F PT TOBACCO SCREEN RCVD TLK: CPT | Performed by: FAMILY MEDICINE

## 2018-03-07 RX ORDER — GABAPENTIN 400 MG/1
400 CAPSULE ORAL 3 TIMES DAILY
COMMUNITY
End: 2018-04-09 | Stop reason: SDUPTHER

## 2018-03-07 RX ORDER — LAMOTRIGINE 25 MG/1
TABLET ORAL
Qty: 77 TABLET | Refills: 0 | Status: SHIPPED | OUTPATIENT
Start: 2018-03-07 | End: 2018-04-09 | Stop reason: SDUPTHER

## 2018-03-07 RX ORDER — ERGOCALCIFEROL (VITAMIN D2) 1250 MCG
50000 CAPSULE ORAL WEEKLY
Qty: 12 CAPSULE | Refills: 2 | Status: SHIPPED | OUTPATIENT
Start: 2018-03-07 | End: 2018-05-09 | Stop reason: SDUPTHER

## 2018-03-07 ASSESSMENT — PATIENT HEALTH QUESTIONNAIRE - PHQ9: SUM OF ALL RESPONSES TO PHQ QUESTIONS 1-9: 2

## 2018-03-07 ASSESSMENT — ANXIETY QUESTIONNAIRES: GAD7 TOTAL SCORE: 5

## 2018-03-07 ASSESSMENT — LIFESTYLE VARIABLES: HOW OFTEN DO YOU HAVE A DRINK CONTAINING ALCOHOL: 0

## 2018-03-07 NOTE — PROGRESS NOTES
Smoking  What are the medicines you can use? Your doctor may prescribe varenicline (Chantix) or bupropion (Zyban). These medicines can help you cope with cravings for tobacco. They are pills that don't contain nicotine. You also can use nicotine replacement products. These do contain nicotine. There are many types. · Gum and lozenges slowly release nicotine into your mouth. · Patches stick to your skin. They slowly release nicotine into your bloodstream.  · An inhaler has a evans that contains nicotine. You breathe in a puff of nicotine vapor through your mouth and throat. · Nasal spray releases a mist that contains nicotine. What are key points about this decision? · Using medicines can double your chances of quitting smoking. They can ease cravings and withdrawal symptoms. · Getting counseling along with using medicine can raise your chances of quitting even more. · If you smoke fewer than 5 cigarettes a day, you may not need medicines to help you quit smoking. · These medicines have less nicotine than cigarettes. And by itself, nicotine is not nearly as harmful as smoking. The tars, carbon monoxide, and other toxic chemicals in tobacco cause the harmful effects. · The side effects of nicotine replacement products depend on the type of product. For example, a patch can make your skin red and itchy. Medicines in pill form can make you sick to your stomach. They can also cause dry mouth and trouble sleeping. For most people, the side effects are not bad enough to make them stop using the products. Why might you choose to use medicines to quit smoking? · You have tried on your own to stop smoking, but you were not able to stop. · You smoke more than 5 cigarettes a day. · You want to increase your chances of quitting smoking. · You want to reduce your cravings and withdrawal symptoms. · You feel the benefits of medicine outweigh the side effects. Why might you choose not to use medicine?   · You want 07/19/17 186 lb (84.4 kg)     Vitals:    03/07/18 0951   BP: 138/88   Pulse: 108   Resp: 18   Temp: 98 °F (36.7 °C)   SpO2: 97%   Weight: 189 lb (85.7 kg)   Height: 5' 6\" (1.676 m)           Patient's complete Health Risk Assessment and screening values have been reviewed and are found in Flowsheets. The following problems were reviewed today and where indicated follow up appointments were made and/or referrals ordered. Positive Risk Factor Screenings with Interventions:           Substance Abuse:  Social History     Tobacco History     Smoking Status  Current Every Day Smoker    Smokeless Tobacco Use  Never Used          Alcohol History     Alcohol Use Status  No          Drug Use     Drug Use Status  No          Sexual Activity     Sexually Active  Not Asked               Audit Questionnaire: Screen for Alcohol Misuse  How often do you have a drink containing alcohol?: Never  Substance Abuse Interventions:  · Tobacco abuse:  tobacco cessation tips and resources provided    General Health:  General  In general, how would you say your health is?: (!) Poor  In the past 7 days, have you experienced any of the following?: (!) New or Increased Pain, New or Increased Fatigue, Loneliness, Social Isolation, Stress, Anger  Do you get the social and emotional support that you need?: Yes  Do you have a Living Will?: (!) No  General Health Risk Interventions:  · Pain issues: patient declines any further evaluation/treatment for this issue  · Stress: medication prescribed- See orders    Health Habits/Nutrition:  Health Habits/Nutrition  Do you exercise for at least 20 minutes 2-3 times per week?: Yes  Have you lost any weight without trying in the past 3 months?: No  Do you eat fewer than 2 meals per day?: (!) Yes  Have you seen a dentist within the past year?: Yes  Body mass index is 30.51 kg/m².   Health Habits/Nutrition Interventions:  · Inadequate physical activity:  patient agrees to exercise for at least 150

## 2018-03-07 NOTE — PATIENT INSTRUCTIONS
Start taking lamictal: 1 tab for 2 weeks, then 2 tabs for 1 week, then 3 tabs for 1 week, then 4 tabs for 1 week. Get your labs checked in [address]. Get fasting lab work done (no food or drink besides water after midnight and medicines as prescribed) before the next follow up please. Please arrive 15 minutes early to next follow up appointment in 4 weeks schedule an appointment sooner if needed. Stopping Smoking: Care Instructions  Your Care Instructions    Cigarette smokers crave the nicotine in cigarettes. Giving it up is much harder than simply changing a habit. Your body has to stop craving the nicotine. It is hard to quit, but you can do it. There are many tools that people use to quit smoking. You may find that combining tools works best for you. There are several steps to quitting. First you get ready to quit. Then you get support to help you. After that, you learn new skills and behaviors to become a nonsmoker. For many people, a necessary step is getting and using medicine. Your doctor will help you set up the plan that best meets your needs. You may want to attend a smoking cessation program to help you quit smoking. When you choose a program, look for one that has proven success. Ask your doctor for ideas. You will greatly increase your chances of success if you take medicine as well as get counseling or join a cessation program.  Some of the changes you feel when you first quit tobacco are uncomfortable. Your body will miss the nicotine at first, and you may feel short-tempered and grumpy. You may have trouble sleeping or concentrating. Medicine can help you deal with these symptoms. You may struggle with changing your smoking habits and rituals. The last step is the tricky one: Be prepared for the smoking urge to continue for a time. This is a lot to deal with, but keep at it. You will feel better. Follow-up care is a key part of your treatment and safety.  Be sure to make and go to all appointments, and call your doctor if you are having problems. It's also a good idea to know your test results and keep a list of the medicines you take. How can you care for yourself at home? · Ask your family, friends, and coworkers for support. You have a better chance of quitting if you have help and support. · Join a support group, such as Nicotine Anonymous, for people who are trying to quit smoking. · Consider signing up for a smoking cessation program, such as the American Lung Association's Freedom from Smoking program.  · Set a quit date. Pick your date carefully so that it is not right in the middle of a big deadline or stressful time. Once you quit, do not even take a puff. Get rid of all ashtrays and lighters after your last cigarette. Clean your house and your clothes so that they do not smell of smoke. · Learn how to be a nonsmoker. Think about ways you can avoid those things that make you reach for a cigarette. ¨ Avoid situations that put you at greatest risk for smoking. For some people, it is hard to have a drink with friends without smoking. For others, they might skip a coffee break with coworkers who smoke. ¨ Change your daily routine. Take a different route to work or eat a meal in a different place. · Cut down on stress. Calm yourself or release tension by doing an activity you enjoy, such as reading a book, taking a hot bath, or gardening. · Talk to your doctor or pharmacist about nicotine replacement therapy, which replaces the nicotine in your body. You still get nicotine but you do not use tobacco. Nicotine replacement products help you slowly reduce the amount of nicotine you need. These products come in several forms, many of them available over-the-counter:  ¨ Nicotine patches  ¨ Nicotine gum and lozenges  ¨ Nicotine inhaler  · Ask your doctor about bupropion (Wellbutrin) or varenicline (Chantix), which are prescription medicines.  They do not contain the longer you stay smoke-free, the healthier you get and the better you feel. The first hours  · After just 20 minutes, your blood pressure and heart rate go down. That means there's less stress on your heart and blood vessels. · Within 12 hours, the level of carbon monoxide in your blood drops back to normal. That makes room for more oxygen. With more oxygen in your body, you may notice that you have more energy than when you smoked. After 2 weeks  · Your lungs start to work better. · Your risk of heart attack starts to drop. After 1 month  · When your lungs are clear, you cough less and breathe deeper, so it's easier to be active. · Your sense of taste and smell return. That means you can enjoy food more than you have since you started smoking. Over the years  · After 1 year, your risk of heart disease is half what it would be if you kept smoking. · After 5 years, your risk of stroke starts to shrink. Within a few years after that, it's about the same as if you'd never smoked. · After 10 years, your risk of dying from lung cancer is cut by about half. And your risk for many other types of cancer is lower too. How would quitting help others in your life? When you quit smoking, you improve the health of everyone who now breathes in your smoke. · Their heart, lung, and cancer risks drop, much like yours. · They are sick less. For babies and small children, living smoke-free means they're less likely to have ear infections, pneumonia, and bronchitis. · If you're a woman who is or will be pregnant someday, quitting smoking means a healthier . · Children who are close to you are less likely to become adult smokers. Where can you learn more? Go to https://jamie.[employer]Databricks. org and sign in to your [employer] account. Enter [phone] in the [employer] box to learn more about \"Learning About Benefits From Quitting Smoking. \"     If you do not have an account, please click on the \"Sign Up Now\" link. Current as of: March 20, 2017  Content Version: 11.5  © 9342-4557 [employer] | Anywhere. Care instructions adapted under license by [facility] ([facility]). If you have questions about a medical condition or this instruction, always ask your healthcare professional. [address] any warranty or liability for your use of this information. Deciding About Using Medicines To Quit Smoking  Deciding About Using Medicines To Quit Smoking  What are the medicines you can use? Your doctor may prescribe varenicline (Chantix) or bupropion (Zyban). These medicines can help you cope with cravings for tobacco. They are pills that don't contain nicotine. You also can use nicotine replacement products. These do contain nicotine. There are many types. · Gum and lozenges slowly release nicotine into your mouth. · Patches stick to your skin. They slowly release nicotine into your bloodstream.  · An inhaler has a evans that contains nicotine. You breathe in a puff of nicotine vapor through your mouth and throat. · Nasal spray releases a mist that contains nicotine. What are key points about this decision? · Using medicines can double your chances of quitting smoking. They can ease cravings and withdrawal symptoms. · Getting counseling along with using medicine can raise your chances of quitting even more. · If you smoke fewer than 5 cigarettes a day, you may not need medicines to help you quit smoking. · These medicines have less nicotine than cigarettes. And by itself, nicotine is not nearly as harmful as smoking. The tars, carbon monoxide, and other toxic chemicals in tobacco cause the harmful effects. · The side effects of nicotine replacement products depend on the type of product. For example, a patch can make your skin red and itchy. Medicines in pill form can make you sick to your stomach. They can also cause dry mouth and trouble sleeping.  For most people, the side effects

## 2018-03-21 ASSESSMENT — ENCOUNTER SYMPTOMS
SHORTNESS OF BREATH: 0
EYE ITCHING: 0
NAUSEA: 0
COLOR CHANGE: 0
COUGH: 0
RHINORRHEA: 0
ABDOMINAL PAIN: 0
SORE THROAT: 0

## 2018-03-22 ENCOUNTER — CARE COORDINATION (OUTPATIENT)
Dept: CARE COORDINATION | Age: 38
End: 2018-03-22

## 2018-04-09 ENCOUNTER — OFFICE VISIT (OUTPATIENT)
Dept: PRIMARY CARE CLINIC | Age: 38
End: 2018-04-09
Payer: MEDICARE

## 2018-04-09 VITALS
HEART RATE: 109 BPM | HEIGHT: 66 IN | BODY MASS INDEX: 31.18 KG/M2 | DIASTOLIC BLOOD PRESSURE: 90 MMHG | SYSTOLIC BLOOD PRESSURE: 140 MMHG | WEIGHT: 194 LBS | OXYGEN SATURATION: 97 % | RESPIRATION RATE: 20 BRPM | TEMPERATURE: 98 F

## 2018-04-09 DIAGNOSIS — J30.89 CHRONIC NONSEASONAL ALLERGIC RHINITIS DUE TO OTHER ALLERGEN: ICD-10-CM

## 2018-04-09 DIAGNOSIS — G62.9 PERIPHERAL POLYNEUROPATHY: ICD-10-CM

## 2018-04-09 DIAGNOSIS — F31.9 BIPOLAR DEPRESSION (HCC): Primary | ICD-10-CM

## 2018-04-09 DIAGNOSIS — Z72.0 TOBACCO ABUSE: ICD-10-CM

## 2018-04-09 DIAGNOSIS — G25.81 RESTLESS LEG: ICD-10-CM

## 2018-04-09 DIAGNOSIS — I10 ESSENTIAL HYPERTENSION: ICD-10-CM

## 2018-04-09 PROCEDURE — 99406 BEHAV CHNG SMOKING 3-10 MIN: CPT | Performed by: FAMILY MEDICINE

## 2018-04-09 PROCEDURE — 4004F PT TOBACCO SCREEN RCVD TLK: CPT | Performed by: FAMILY MEDICINE

## 2018-04-09 PROCEDURE — G8417 CALC BMI ABV UP PARAM F/U: HCPCS | Performed by: FAMILY MEDICINE

## 2018-04-09 PROCEDURE — G8427 DOCREV CUR MEDS BY ELIG CLIN: HCPCS | Performed by: FAMILY MEDICINE

## 2018-04-09 PROCEDURE — 99214 OFFICE O/P EST MOD 30 MIN: CPT | Performed by: FAMILY MEDICINE

## 2018-04-09 RX ORDER — ARIPIPRAZOLE 5 MG/1
5 TABLET ORAL DAILY
Qty: 30 TABLET | Refills: 3 | Status: SHIPPED | OUTPATIENT
Start: 2018-04-09 | End: 2018-05-09 | Stop reason: SDUPTHER

## 2018-04-09 RX ORDER — LAMOTRIGINE 100 MG/1
100 TABLET ORAL DAILY
Qty: 30 TABLET | Refills: 1 | Status: SHIPPED | OUTPATIENT
Start: 2018-04-09 | End: 2018-05-01 | Stop reason: ALTCHOICE

## 2018-04-09 RX ORDER — GABAPENTIN 400 MG/1
CAPSULE ORAL
Qty: 120 CAPSULE | Refills: 1 | Status: SHIPPED | OUTPATIENT
Start: 2018-04-09 | End: 2018-06-05 | Stop reason: SDUPTHER

## 2018-04-09 RX ORDER — BENAZEPRIL HYDROCHLORIDE 20 MG/1
20 TABLET ORAL DAILY
Qty: 90 TABLET | Refills: 1 | Status: SHIPPED | OUTPATIENT
Start: 2018-04-09 | End: 2018-05-09 | Stop reason: SDUPTHER

## 2018-04-09 RX ORDER — CETIRIZINE HYDROCHLORIDE 10 MG/1
10 TABLET ORAL DAILY
Qty: 90 TABLET | Refills: 3 | Status: SHIPPED | OUTPATIENT
Start: 2018-04-09 | End: 2018-05-09 | Stop reason: SDUPTHER

## 2018-04-09 ASSESSMENT — ENCOUNTER SYMPTOMS
VOMITING: 0
SHORTNESS OF BREATH: 0
WHEEZING: 0
COUGH: 0
DIARRHEA: 0
CHEST TIGHTNESS: 0
ABDOMINAL PAIN: 0
NAUSEA: 0
CONSTIPATION: 0

## 2018-04-11 ENCOUNTER — TELEPHONE (OUTPATIENT)
Dept: PRIMARY CARE CLINIC | Age: 38
End: 2018-04-11

## 2018-04-19 ENCOUNTER — TRANSCRIBE ORDERS (OUTPATIENT)
Dept: ADMINISTRATIVE | Facility: HOSPITAL | Age: 38
End: 2018-04-19

## 2018-04-19 DIAGNOSIS — M54.16 LUMBAR RADICULOPATHY: ICD-10-CM

## 2018-04-19 DIAGNOSIS — M54.5 LOW BACK PAIN, UNSPECIFIED BACK PAIN LATERALITY, UNSPECIFIED CHRONICITY, WITH SCIATICA PRESENCE UNSPECIFIED: Primary | ICD-10-CM

## 2018-04-25 ENCOUNTER — HOSPITAL ENCOUNTER (OUTPATIENT)
Dept: CT IMAGING | Facility: HOSPITAL | Age: 38
Discharge: HOME | End: 2018-04-25
Admitting: PHYSICIAN ASSISTANT

## 2018-04-25 DIAGNOSIS — M54.5 LOW BACK PAIN, UNSPECIFIED BACK PAIN LATERALITY, UNSPECIFIED CHRONICITY, WITH SCIATICA PRESENCE UNSPECIFIED: ICD-10-CM

## 2018-04-25 DIAGNOSIS — M54.16 LUMBAR RADICULOPATHY: ICD-10-CM

## 2018-04-25 PROCEDURE — 72131 CT LUMBAR SPINE W/O DYE: CPT

## 2018-04-27 ENCOUNTER — CARE COORDINATION (OUTPATIENT)
Dept: CARE COORDINATION | Age: 38
End: 2018-04-27

## 2018-05-01 ENCOUNTER — HOSPITAL ENCOUNTER (OUTPATIENT)
Dept: GENERAL RADIOLOGY | Age: 38
Discharge: HOME | End: 2018-05-01
Payer: MEDICARE

## 2018-05-01 ENCOUNTER — OFFICE VISIT (OUTPATIENT)
Dept: CARDIOLOGY | Age: 38
End: 2018-05-01
Payer: MEDICARE

## 2018-05-01 VITALS
DIASTOLIC BLOOD PRESSURE: 82 MMHG | SYSTOLIC BLOOD PRESSURE: 122 MMHG | HEART RATE: 105 BPM | WEIGHT: 196 LBS | BODY MASS INDEX: 31.5 KG/M2 | HEIGHT: 66 IN

## 2018-05-01 DIAGNOSIS — F17.200 SMOKER: ICD-10-CM

## 2018-05-01 DIAGNOSIS — R07.89 OTHER CHEST PAIN: ICD-10-CM

## 2018-05-01 DIAGNOSIS — Z82.49 FAMILY HISTORY OF CORONARY ARTERY DISEASE IN MOTHER: ICD-10-CM

## 2018-05-01 DIAGNOSIS — R07.89 OTHER CHEST PAIN: Primary | ICD-10-CM

## 2018-05-01 DIAGNOSIS — E78.2 MIXED HYPERLIPIDEMIA: ICD-10-CM

## 2018-05-01 DIAGNOSIS — I10 ESSENTIAL HYPERTENSION: ICD-10-CM

## 2018-05-01 PROBLEM — R07.9 CHEST PAIN: Status: ACTIVE | Noted: 2018-05-01

## 2018-05-01 PROCEDURE — 93000 ELECTROCARDIOGRAM COMPLETE: CPT | Performed by: NURSE PRACTITIONER

## 2018-05-01 PROCEDURE — 99214 OFFICE O/P EST MOD 30 MIN: CPT | Performed by: NURSE PRACTITIONER

## 2018-05-01 PROCEDURE — 4004F PT TOBACCO SCREEN RCVD TLK: CPT | Performed by: NURSE PRACTITIONER

## 2018-05-01 PROCEDURE — G8417 CALC BMI ABV UP PARAM F/U: HCPCS | Performed by: NURSE PRACTITIONER

## 2018-05-01 PROCEDURE — 71046 X-RAY EXAM CHEST 2 VIEWS: CPT

## 2018-05-01 PROCEDURE — G8427 DOCREV CUR MEDS BY ELIG CLIN: HCPCS | Performed by: NURSE PRACTITIONER

## 2018-05-03 PROBLEM — E78.2 MIXED HYPERLIPIDEMIA: Status: ACTIVE | Noted: 2018-05-03

## 2018-05-07 DIAGNOSIS — I10 ESSENTIAL HYPERTENSION: ICD-10-CM

## 2018-05-07 RX ORDER — LISINOPRIL 10 MG/1
TABLET ORAL
Qty: 30 TABLET | Refills: 5 | Status: SHIPPED | OUTPATIENT
Start: 2018-05-07 | End: 2018-05-23 | Stop reason: SDUPTHER

## 2018-05-09 DIAGNOSIS — I10 ESSENTIAL HYPERTENSION: ICD-10-CM

## 2018-05-09 RX ORDER — CETIRIZINE HYDROCHLORIDE 10 MG/1
10 TABLET ORAL DAILY
Qty: 90 TABLET | Refills: 3 | Status: SHIPPED | OUTPATIENT
Start: 2018-05-09 | End: 2020-07-12

## 2018-05-09 RX ORDER — ERGOCALCIFEROL (VITAMIN D2) 1250 MCG
50000 CAPSULE ORAL WEEKLY
Qty: 12 CAPSULE | Refills: 2 | Status: SHIPPED | OUTPATIENT
Start: 2018-05-09 | End: 2020-07-12

## 2018-05-09 RX ORDER — BENAZEPRIL HYDROCHLORIDE 20 MG/1
20 TABLET ORAL DAILY
Qty: 90 TABLET | Refills: 1 | Status: SHIPPED | OUTPATIENT
Start: 2018-05-09 | End: 2018-05-23

## 2018-05-09 RX ORDER — ARIPIPRAZOLE 5 MG/1
5 TABLET ORAL DAILY
Qty: 90 TABLET | Refills: 3 | Status: SHIPPED | OUTPATIENT
Start: 2018-05-09 | End: 2018-09-19 | Stop reason: SDUPTHER

## 2018-05-23 ENCOUNTER — OFFICE VISIT (OUTPATIENT)
Dept: PRIMARY CARE CLINIC | Age: 38
End: 2018-05-23
Payer: MEDICARE

## 2018-05-23 VITALS
WEIGHT: 203 LBS | DIASTOLIC BLOOD PRESSURE: 82 MMHG | TEMPERATURE: 98 F | RESPIRATION RATE: 20 BRPM | BODY MASS INDEX: 33.82 KG/M2 | SYSTOLIC BLOOD PRESSURE: 130 MMHG | HEART RATE: 84 BPM | HEIGHT: 65 IN

## 2018-05-23 DIAGNOSIS — F31.9 BIPOLAR DEPRESSION (HCC): ICD-10-CM

## 2018-05-23 DIAGNOSIS — I10 ESSENTIAL HYPERTENSION: Primary | ICD-10-CM

## 2018-05-23 PROCEDURE — 4004F PT TOBACCO SCREEN RCVD TLK: CPT | Performed by: FAMILY MEDICINE

## 2018-05-23 PROCEDURE — G8427 DOCREV CUR MEDS BY ELIG CLIN: HCPCS | Performed by: FAMILY MEDICINE

## 2018-05-23 PROCEDURE — 99213 OFFICE O/P EST LOW 20 MIN: CPT | Performed by: FAMILY MEDICINE

## 2018-05-23 PROCEDURE — G8417 CALC BMI ABV UP PARAM F/U: HCPCS | Performed by: FAMILY MEDICINE

## 2018-05-23 RX ORDER — LAMOTRIGINE 100 MG/1
100 TABLET ORAL DAILY
Qty: 90 TABLET | Refills: 3 | Status: SHIPPED | OUTPATIENT
Start: 2018-05-23 | End: 2018-12-19 | Stop reason: SDUPTHER

## 2018-05-23 RX ORDER — LISINOPRIL 20 MG/1
20 TABLET ORAL DAILY
Qty: 90 TABLET | Refills: 2 | Status: SHIPPED | OUTPATIENT
Start: 2018-05-23 | End: 2018-09-19

## 2018-05-23 ASSESSMENT — ENCOUNTER SYMPTOMS
COUGH: 0
DIARRHEA: 0
NAUSEA: 0
CONSTIPATION: 0
ABDOMINAL PAIN: 0
CHEST TIGHTNESS: 0
VOMITING: 0
SHORTNESS OF BREATH: 0
WHEEZING: 0

## 2018-05-29 ENCOUNTER — TELEPHONE (OUTPATIENT)
Dept: CARDIOLOGY | Age: 38
End: 2018-05-29

## 2018-06-05 ENCOUNTER — OFFICE VISIT (OUTPATIENT)
Dept: CARDIOLOGY | Age: 38
End: 2018-06-05
Payer: MEDICARE

## 2018-06-05 VITALS
RESPIRATION RATE: 18 BRPM | SYSTOLIC BLOOD PRESSURE: 136 MMHG | HEIGHT: 66 IN | HEART RATE: 77 BPM | DIASTOLIC BLOOD PRESSURE: 82 MMHG | BODY MASS INDEX: 31.82 KG/M2 | WEIGHT: 198 LBS

## 2018-06-05 DIAGNOSIS — M54.5 BILATERAL LOW BACK PAIN, UNSPECIFIED CHRONICITY, WITH SCIATICA PRESENCE UNSPECIFIED: Primary | ICD-10-CM

## 2018-06-05 DIAGNOSIS — R07.89 CHEST PRESSURE: ICD-10-CM

## 2018-06-05 DIAGNOSIS — I10 ESSENTIAL HYPERTENSION: Primary | ICD-10-CM

## 2018-06-05 DIAGNOSIS — E78.2 MIXED HYPERLIPIDEMIA: ICD-10-CM

## 2018-06-05 PROCEDURE — G8417 CALC BMI ABV UP PARAM F/U: HCPCS | Performed by: INTERNAL MEDICINE

## 2018-06-05 PROCEDURE — 4004F PT TOBACCO SCREEN RCVD TLK: CPT | Performed by: INTERNAL MEDICINE

## 2018-06-05 PROCEDURE — G8427 DOCREV CUR MEDS BY ELIG CLIN: HCPCS | Performed by: INTERNAL MEDICINE

## 2018-06-05 PROCEDURE — 99213 OFFICE O/P EST LOW 20 MIN: CPT | Performed by: INTERNAL MEDICINE

## 2018-06-05 RX ORDER — GABAPENTIN 400 MG/1
CAPSULE ORAL
Qty: 120 CAPSULE | Refills: 0 | OUTPATIENT
Start: 2018-06-05 | End: 2018-07-09 | Stop reason: SDUPTHER

## 2018-06-05 RX ORDER — GABAPENTIN 400 MG/1
CAPSULE ORAL
Qty: 120 CAPSULE | Refills: 2 | Status: CANCELLED | OUTPATIENT
Start: 2018-06-05 | End: 2018-07-05

## 2018-07-09 ENCOUNTER — CARE COORDINATION (OUTPATIENT)
Dept: CARE COORDINATION | Age: 38
End: 2018-07-09

## 2018-07-09 DIAGNOSIS — M54.5 BILATERAL LOW BACK PAIN, UNSPECIFIED CHRONICITY, WITH SCIATICA PRESENCE UNSPECIFIED: ICD-10-CM

## 2018-07-09 RX ORDER — GABAPENTIN 400 MG/1
CAPSULE ORAL
Qty: 120 CAPSULE | Refills: 0 | OUTPATIENT
Start: 2018-07-09

## 2018-07-09 RX ORDER — GABAPENTIN 400 MG/1
CAPSULE ORAL
Qty: 120 CAPSULE | Refills: 2 | Status: SHIPPED | OUTPATIENT
Start: 2018-07-09 | End: 2018-10-01 | Stop reason: SDUPTHER

## 2018-07-13 ENCOUNTER — TELEPHONE (OUTPATIENT)
Dept: PRIMARY CARE CLINIC | Age: 38
End: 2018-07-13

## 2018-07-17 NOTE — CARE COORDINATION
7/9/18-Pt significant other called stating pt is needing a refill on gabapentin, let Chapis Shepherd MA know of pt request

## 2018-07-18 ENCOUNTER — OFFICE VISIT (OUTPATIENT)
Dept: PRIMARY CARE CLINIC | Age: 38
End: 2018-07-18
Payer: MEDICARE

## 2018-07-18 VITALS
RESPIRATION RATE: 18 BRPM | BODY MASS INDEX: 31.82 KG/M2 | OXYGEN SATURATION: 97 % | TEMPERATURE: 97.8 F | DIASTOLIC BLOOD PRESSURE: 88 MMHG | WEIGHT: 198 LBS | SYSTOLIC BLOOD PRESSURE: 136 MMHG | HEIGHT: 66 IN | HEART RATE: 98 BPM

## 2018-07-18 DIAGNOSIS — K52.9 GASTROENTERITIS: Primary | ICD-10-CM

## 2018-07-18 PROCEDURE — G8417 CALC BMI ABV UP PARAM F/U: HCPCS | Performed by: FAMILY MEDICINE

## 2018-07-18 PROCEDURE — 99213 OFFICE O/P EST LOW 20 MIN: CPT | Performed by: FAMILY MEDICINE

## 2018-07-18 PROCEDURE — G8428 CUR MEDS NOT DOCUMENT: HCPCS | Performed by: FAMILY MEDICINE

## 2018-07-18 PROCEDURE — 4004F PT TOBACCO SCREEN RCVD TLK: CPT | Performed by: FAMILY MEDICINE

## 2018-07-18 RX ORDER — PROMETHAZINE HYDROCHLORIDE 25 MG/1
25 TABLET ORAL EVERY 6 HOURS PRN
Qty: 20 TABLET | Refills: 0 | Status: SHIPPED | OUTPATIENT
Start: 2018-07-18 | End: 2019-02-21 | Stop reason: SDUPTHER

## 2018-07-18 ASSESSMENT — ENCOUNTER SYMPTOMS
CONSTIPATION: 0
WHEEZING: 0
COUGH: 0
VOMITING: 1
NAUSEA: 1
CHEST TIGHTNESS: 0
ABDOMINAL PAIN: 0
SHORTNESS OF BREATH: 0
DIARRHEA: 0

## 2018-07-18 NOTE — PROGRESS NOTES
Latanya Weiner is a 45 y.o. male who presents today for   Chief Complaint   Patient presents with    Other       HPI  Patient is here for patient is here for complaint of 3 day history of nausea with some mild vomiting. His fiancée is also ill at this time. Denies dizziness or lightheadedness or any diarrhea. No change in PMH, family, social, or surgical history unless mentioned above. Review of Systems   Constitutional: Negative for chills and fever. Respiratory: Negative for cough, chest tightness, shortness of breath and wheezing. Cardiovascular: Negative for chest pain, palpitations and leg swelling. Gastrointestinal: Positive for nausea and vomiting. Negative for abdominal pain, constipation and diarrhea. Genitourinary: Negative for difficulty urinating, dysuria and frequency. Past Medical History:   Diagnosis Date    Back pain     Chest pain     Scoliosis     Tobacco abuse     Vitamin D deficiency        Current Outpatient Prescriptions   Medication Sig Dispense Refill    promethazine (PHENERGAN) 25 MG tablet Take 1 tablet by mouth every 6 hours as needed for Nausea 20 tablet 0    gabapentin (NEURONTIN) 400 MG capsule Take 2 caps at night, 1 in morning, 1 in afternoon. . 120 capsule 2    lisinopril (PRINIVIL;ZESTRIL) 20 MG tablet Take 1 tablet by mouth daily 90 tablet 2    lamoTRIgine (LAMICTAL) 100 MG tablet Take 1 tablet by mouth daily 90 tablet 3    ARIPiprazole (ABILIFY) 5 MG tablet Take 1 tablet by mouth daily 90 tablet 3    cetirizine (ZYRTEC) 10 MG tablet Take 1 tablet by mouth daily 90 tablet 3    Cholecalciferol (VITAMIN D3) 5000 units CAPS 1 tab/cap daily 90 capsule 5    ergocalciferol (ERGOCALCIFEROL) 30748 units capsule Take 1 capsule by mouth once a week 12 capsule 2    cyanocobalamin 500 MCG tablet Take 1 tablet by mouth daily 90 tablet 0    HYDROcodone-acetaminophen (NORCO) 5-325 MG per tablet Take 1 tablet by mouth every 6 hours as needed for Pain .        No

## 2018-07-27 ENCOUNTER — CARE COORDINATION (OUTPATIENT)
Dept: CARE COORDINATION | Age: 38
End: 2018-07-27

## 2018-07-27 ENCOUNTER — OFFICE VISIT (OUTPATIENT)
Dept: PRIMARY CARE CLINIC | Age: 38
End: 2018-07-27
Payer: MEDICARE

## 2018-07-27 VITALS
SYSTOLIC BLOOD PRESSURE: 100 MMHG | DIASTOLIC BLOOD PRESSURE: 78 MMHG | WEIGHT: 198 LBS | HEIGHT: 66 IN | TEMPERATURE: 97.1 F | HEART RATE: 119 BPM | RESPIRATION RATE: 18 BRPM | OXYGEN SATURATION: 98 % | BODY MASS INDEX: 31.82 KG/M2

## 2018-07-27 DIAGNOSIS — R13.10 DYSPHAGIA, UNSPECIFIED TYPE: Primary | ICD-10-CM

## 2018-07-27 PROCEDURE — G8417 CALC BMI ABV UP PARAM F/U: HCPCS | Performed by: FAMILY MEDICINE

## 2018-07-27 PROCEDURE — 4004F PT TOBACCO SCREEN RCVD TLK: CPT | Performed by: FAMILY MEDICINE

## 2018-07-27 PROCEDURE — 99213 OFFICE O/P EST LOW 20 MIN: CPT | Performed by: FAMILY MEDICINE

## 2018-07-27 PROCEDURE — G8428 CUR MEDS NOT DOCUMENT: HCPCS | Performed by: FAMILY MEDICINE

## 2018-07-27 ASSESSMENT — ENCOUNTER SYMPTOMS
COUGH: 0
ABDOMINAL PAIN: 0
WHEEZING: 0
CONSTIPATION: 0
SHORTNESS OF BREATH: 0
DIARRHEA: 0
VOMITING: 1
CHEST TIGHTNESS: 0
NAUSEA: 1

## 2018-07-27 NOTE — PROGRESS NOTES
Vernell Tesfaye is a 45 y.o. male who presents today for   Chief Complaint   Patient presents with    Other       HPI  Patient is here for concern of regurgitation. Occurs paroxysmal.  States he feels like food gets stuck as distal esophagus. Has had some ongoing nausea more recently last couple months. No change in PMH, family, social, or surgical history unless mentioned above. Review of Systems   Constitutional: Negative for chills and fever. Respiratory: Negative for cough, chest tightness, shortness of breath and wheezing. Cardiovascular: Negative for chest pain, palpitations and leg swelling. Gastrointestinal: Positive for nausea and vomiting. Negative for abdominal pain, constipation and diarrhea. Genitourinary: Negative for difficulty urinating, dysuria and frequency. Past Medical History:   Diagnosis Date    Back pain     Chest pain     Scoliosis     Tobacco abuse     Vitamin D deficiency        Current Outpatient Prescriptions   Medication Sig Dispense Refill    gabapentin (NEURONTIN) 400 MG capsule Take 2 caps at night, 1 in morning, 1 in afternoon. . 120 capsule 2    lisinopril (PRINIVIL;ZESTRIL) 20 MG tablet Take 1 tablet by mouth daily 90 tablet 2    lamoTRIgine (LAMICTAL) 100 MG tablet Take 1 tablet by mouth daily 90 tablet 3    ARIPiprazole (ABILIFY) 5 MG tablet Take 1 tablet by mouth daily 90 tablet 3    cetirizine (ZYRTEC) 10 MG tablet Take 1 tablet by mouth daily 90 tablet 3    Cholecalciferol (VITAMIN D3) 5000 units CAPS 1 tab/cap daily 90 capsule 5    ergocalciferol (ERGOCALCIFEROL) 28130 units capsule Take 1 capsule by mouth once a week 12 capsule 2    cyanocobalamin 500 MCG tablet Take 1 tablet by mouth daily 90 tablet 0    HYDROcodone-acetaminophen (NORCO) 5-325 MG per tablet Take 1 tablet by mouth every 6 hours as needed for Pain . No current facility-administered medications for this visit.         Allergies   Allergen Reactions    Flexeril [Cyclobenzaprine]     Toradol [Ketorolac Tromethamine]      itching    Demerol Hcl [Meperidine] Rash    Other Rash     darvocet       Past Surgical History:   Procedure Laterality Date    BACK SURGERY      x 3        Social History   Substance Use Topics    Smoking status: Current Every Day Smoker    Smokeless tobacco: Never Used    Alcohol use No       Family History   Problem Relation Age of Onset    Heart Disease Mother     Hypertension Mother     Cancer Father         prostate       /78   Pulse 119   Temp 97.1 °F (36.2 °C) (Temporal)   Resp 18   Ht 5' 6\" (1.676 m)   Wt 198 lb (89.8 kg)   SpO2 98%   BMI 31.96 kg/m²     Physical Exam   Constitutional: He is oriented to person, place, and time. He appears well-developed and well-nourished. Non-toxic appearance. No distress. HENT:   Head: Normocephalic and atraumatic. Cardiovascular: Normal rate, regular rhythm, normal heart sounds and intact distal pulses. Exam reveals no gallop and no friction rub. No murmur heard. Pulmonary/Chest: Effort normal and breath sounds normal. No respiratory distress. He has no wheezes. He has no rales. He exhibits no tenderness. Abdominal: Soft. Bowel sounds are normal. He exhibits no distension and no mass. There is no tenderness. There is no rebound and no guarding. Musculoskeletal:        Right lower leg: He exhibits no edema. Left lower leg: He exhibits no edema. Neurological: He is alert and oriented to person, place, and time. Coordination and gait normal.   Skin: Skin is warm and dry. He is not diaphoretic. No cyanosis. Nails show no clubbing. Nursing note and vitals reviewed. Assessment:    ICD-10-CM ICD-9-CM    1. Dysphagia, unspecified type R13.10 787.20 FL ESOPHAGRAM       Plan:   I will do a barium swallow for him as it does appear to be possibly achalasia which would explain some of his recent regurgitation and ongoing nausea.   We may refer to GI depending on results  Orders Placed This Encounter   Procedures    FL ESOPHAGRAM     Standing Status:   Future     Standing Expiration Date:   7/27/2019     No orders of the defined types were placed in this encounter. There are no discontinued medications. There are no Patient Instructions on file for this visit. Patient given educational handouts and has had all questions answered. Patient voices understanding and agrees to plans along with risks and benefits of plan. Patient is instructed to continue prior meds, diet, and exercise plans unless instructed otherwise. Patient agrees to follow up as instructed and sooner if needed. Patient agrees to go to ER if condition becomes emergent. Notes may be completed with dictation device and spelling errors may occur. No Follow-up on file.

## 2018-08-10 ENCOUNTER — HOSPITAL ENCOUNTER (OUTPATIENT)
Dept: GENERAL RADIOLOGY | Age: 38
Discharge: HOME | End: 2018-08-10
Payer: MEDICARE

## 2018-08-10 DIAGNOSIS — R13.10 DYSPHAGIA, UNSPECIFIED TYPE: ICD-10-CM

## 2018-08-10 PROCEDURE — 74220 X-RAY XM ESOPHAGUS 1CNTRST: CPT

## 2018-08-15 ENCOUNTER — CARE COORDINATION (OUTPATIENT)
Dept: CARE COORDINATION | Age: 38
End: 2018-08-15

## 2018-08-15 NOTE — CARE COORDINATION
Pt called wanting results from swallow study, study results are present, told pt that they will be reviewed by a provider and he will get results.   Pt voiced understanding

## 2018-09-05 ENCOUNTER — CARE COORDINATION (OUTPATIENT)
Dept: CARE COORDINATION | Age: 38
End: 2018-09-05

## 2018-09-05 NOTE — CARE COORDINATION
ILNDA CAME TO THIS CHW'S OFFICE TO SAY \"HELLO\". WHILE IN OFFICE, PATIENT STATED HE HAS BAD TEETH WITH DISCOMFORT. ASKED LINDA TO MAKE APPOINTMENT WITH DR Marko Medina TO DISCUSS HIS TEETH. PATIENT STATED HE IS DOWN TO SMOKING 1 PACK OF CIGARETTES EVERY COUPLE OF DAYS FROM 2 PACKS DAILY. HE HAS BEEN USING A FRIEND'S PRESCRIPTION OF CHANTIX. THIS CHW ASKED LINDA TO SPEAK WITH DR Marko Medina ABOUT GETTING HIS OWN PRESCRIPTION OF CHANTIX. HE STATED HE WOULD. THIS CHW ADDED DOCUMENTATION TO PATIENT'S FUTURE APPOINTMENT WITH DR Marko Medina TO DISCUSS THE ABOVE ISSUES.     Submitted by Fidel/WHITLEYW

## 2018-09-07 ENCOUNTER — TELEPHONE (OUTPATIENT)
Dept: PRIMARY CARE CLINIC | Age: 38
End: 2018-09-07

## 2018-09-19 ENCOUNTER — OFFICE VISIT (OUTPATIENT)
Dept: PRIMARY CARE CLINIC | Age: 38
End: 2018-09-19
Payer: MEDICARE

## 2018-09-19 VITALS
TEMPERATURE: 98.2 F | HEIGHT: 66 IN | BODY MASS INDEX: 32.82 KG/M2 | SYSTOLIC BLOOD PRESSURE: 130 MMHG | WEIGHT: 204.2 LBS | DIASTOLIC BLOOD PRESSURE: 80 MMHG

## 2018-09-19 DIAGNOSIS — F31.9 BIPOLAR DEPRESSION (HCC): ICD-10-CM

## 2018-09-19 DIAGNOSIS — I10 ESSENTIAL HYPERTENSION: Primary | ICD-10-CM

## 2018-09-19 DIAGNOSIS — F51.5 NIGHTMARE DISORDER: ICD-10-CM

## 2018-09-19 DIAGNOSIS — F17.200 SMOKER: ICD-10-CM

## 2018-09-19 DIAGNOSIS — I95.1 ORTHOSTATIC HYPOTENSION: ICD-10-CM

## 2018-09-19 DIAGNOSIS — K02.9 DENTAL CARIES EXTENDING INTO PULP: ICD-10-CM

## 2018-09-19 PROCEDURE — 4004F PT TOBACCO SCREEN RCVD TLK: CPT | Performed by: FAMILY MEDICINE

## 2018-09-19 PROCEDURE — G8427 DOCREV CUR MEDS BY ELIG CLIN: HCPCS | Performed by: FAMILY MEDICINE

## 2018-09-19 PROCEDURE — G8417 CALC BMI ABV UP PARAM F/U: HCPCS | Performed by: FAMILY MEDICINE

## 2018-09-19 PROCEDURE — 99214 OFFICE O/P EST MOD 30 MIN: CPT | Performed by: FAMILY MEDICINE

## 2018-09-19 RX ORDER — PRAZOSIN HYDROCHLORIDE 1 MG/1
1 CAPSULE ORAL NIGHTLY
Qty: 30 CAPSULE | Refills: 3 | Status: SHIPPED | OUTPATIENT
Start: 2018-09-19 | End: 2019-01-25 | Stop reason: SDUPTHER

## 2018-09-19 RX ORDER — VARENICLINE TARTRATE 1 MG/1
1 TABLET, FILM COATED ORAL 2 TIMES DAILY
Qty: 60 TABLET | Refills: 3 | Status: SHIPPED | OUTPATIENT
Start: 2018-09-19 | End: 2018-12-19 | Stop reason: SDUPTHER

## 2018-09-19 RX ORDER — ARIPIPRAZOLE 10 MG/1
10 TABLET ORAL DAILY
Qty: 30 TABLET | Refills: 2 | Status: SHIPPED | OUTPATIENT
Start: 2018-09-19 | End: 2018-11-27 | Stop reason: SDUPTHER

## 2018-09-19 ASSESSMENT — ENCOUNTER SYMPTOMS
ABDOMINAL PAIN: 0
VOMITING: 0
CONSTIPATION: 0
SHORTNESS OF BREATH: 0
CHEST TIGHTNESS: 0
DIARRHEA: 0
COUGH: 0
WHEEZING: 0
NAUSEA: 0

## 2018-09-19 NOTE — PATIENT INSTRUCTIONS
Stop lisinopril. Increase abilify to 10 mg dailyi. Start nightmare pill at night. Restart chantix twice per day.

## 2018-09-19 NOTE — PROGRESS NOTES
Kay Cole is a 45 y.o. male who presents today for   Chief Complaint   Patient presents with    Follow-up     trying to quit smoking    Discuss Medications     having trouble with BP dropping too low       HPI  Patient is here for f/u HTN. Has had some issues with lisinopril. Has some dizziness when standing sometimes. He is cutting down on cigs right now with chantix he had left over from before. His bipolar depression is improving but not satisfacotry. Patient notes that he has cut down to about 5 or 6 cigarettes. He has not been having nightmares related to this but has a history of severe nightmares and was on \"a little white pill in the past to help. Also having dental issues. No change in PMH, family, social, or surgical history unless mentioned above. Review of Systems   Constitutional: Negative for chills and fever. Respiratory: Negative for cough, chest tightness, shortness of breath and wheezing. Cardiovascular: Negative for chest pain, palpitations and leg swelling. Gastrointestinal: Negative for abdominal pain, constipation, diarrhea, nausea and vomiting. Genitourinary: Negative for difficulty urinating, dysuria and frequency. Neurological: Positive for dizziness. Negative for syncope, speech difficulty and numbness. Psychiatric/Behavioral: Positive for dysphoric mood. Negative for decreased concentration, hallucinations and sleep disturbance.        Past Medical History:   Diagnosis Date    Back pain     Chest pain     Scoliosis     Tobacco abuse     Vitamin D deficiency        Current Outpatient Prescriptions   Medication Sig Dispense Refill    ARIPiprazole (ABILIFY) 10 MG tablet Take 1 tablet by mouth daily 30 tablet 2    varenicline (CHANTIX CONTINUING MONTH CHUNG) 1 MG tablet Take 1 tablet by mouth 2 times daily 60 tablet 3    prazosin (MINIPRESS) 1 MG capsule Take 1 capsule by mouth nightly 30 capsule 3    gabapentin (NEURONTIN) 400 MG capsule Take 2 caps at

## 2018-09-28 DIAGNOSIS — M54.5 BILATERAL LOW BACK PAIN, UNSPECIFIED CHRONICITY, WITH SCIATICA PRESENCE UNSPECIFIED: ICD-10-CM

## 2018-09-30 RX ORDER — GABAPENTIN 400 MG/1
CAPSULE ORAL
Qty: 120 CAPSULE | Refills: 2 | OUTPATIENT
Start: 2018-09-30

## 2018-10-01 DIAGNOSIS — M54.5 BILATERAL LOW BACK PAIN, UNSPECIFIED CHRONICITY, WITH SCIATICA PRESENCE UNSPECIFIED: ICD-10-CM

## 2018-10-01 RX ORDER — GABAPENTIN 400 MG/1
CAPSULE ORAL
Qty: 120 CAPSULE | Refills: 2 | OUTPATIENT
Start: 2018-10-01 | End: 2018-12-26 | Stop reason: SDUPTHER

## 2018-10-01 NOTE — TELEPHONE ENCOUNTER
Last office visit 9-19,next appointment 12-19. Last fill 7-9 with 2  rx called to [facility]  Component Results     Component Value Ref Range & Units Status Collected Lab   Amphetamine Screen, Urine Negative  Negative <1000 ng/mL Final 04/19/2017  9:10 PM [address] [facility]   Barbiturate Screen, Ur Negative  Negative < 200 ng/mL Final 04/19/2017  9:10 PM [address] [facility]   Benzodiazepine Screen, Urine Positive   Negative <100 ng/mL Final 04/19/2017  9:10 PM [address] [facility]   Cannabinoid Scrn, Ur Negative  Negative <50 ng/mL Final 04/19/2017  9:10 PM [address] [facility]   Cocaine Metabolite Screen, Urine Negative  Negative <300 ng/mL Final 04/19/2017  9:10 PM [address] [facility]   Opiate Scrn, Ur Negative  Negative < 300 ng/mL Final 04/19/2017  9:10 PM [address] [facility]   Drug Screen Comment: see below   Final 04/19/2017  9:10 PM [address] [facility]   This method is a screening test to detect only these drug   classes as part of a medical workup.  Confirmatory testing   by another method should be ordered if clinically indicated.     Testing Performed By     Zurdo Roberts Name Director Address Valid Date Range   887-CQ - [address] Unknown [address] [facility] [address] [address] 10/28/14 1539-08/30/17 1233   Lab and Collection     Urine Drug Screen on 4/19/2017

## 2018-10-23 ENCOUNTER — CARE COORDINATION (OUTPATIENT)
Dept: CARE COORDINATION | Age: 38
End: 2018-10-23

## 2018-11-27 RX ORDER — ARIPIPRAZOLE 10 MG/1
10 TABLET ORAL DAILY
Qty: 90 TABLET | Refills: 0 | Status: SHIPPED | OUTPATIENT
Start: 2018-11-27 | End: 2019-07-03 | Stop reason: SDUPTHER

## 2018-12-03 ENCOUNTER — TELEPHONE (OUTPATIENT)
Dept: CARDIOLOGY | Age: 38
End: 2018-12-03

## 2018-12-03 ENCOUNTER — CARE COORDINATION (OUTPATIENT)
Dept: CARE COORDINATION | Age: 38
End: 2018-12-03

## 2018-12-03 NOTE — TELEPHONE ENCOUNTER
Called pt follow up on his Angela Baptise being done. Was not able to reach pt. Had to leave a message.

## 2018-12-11 ENCOUNTER — OFFICE VISIT (OUTPATIENT)
Dept: PRIMARY CARE CLINIC | Age: 38
End: 2018-12-11
Payer: MEDICARE

## 2018-12-11 VITALS
HEART RATE: 100 BPM | SYSTOLIC BLOOD PRESSURE: 150 MMHG | DIASTOLIC BLOOD PRESSURE: 100 MMHG | OXYGEN SATURATION: 99 % | TEMPERATURE: 98 F

## 2018-12-11 DIAGNOSIS — J01.10 ACUTE NON-RECURRENT FRONTAL SINUSITIS: Primary | ICD-10-CM

## 2018-12-11 PROCEDURE — G8427 DOCREV CUR MEDS BY ELIG CLIN: HCPCS | Performed by: FAMILY MEDICINE

## 2018-12-11 PROCEDURE — G8417 CALC BMI ABV UP PARAM F/U: HCPCS | Performed by: FAMILY MEDICINE

## 2018-12-11 PROCEDURE — 99213 OFFICE O/P EST LOW 20 MIN: CPT | Performed by: FAMILY MEDICINE

## 2018-12-11 PROCEDURE — G8484 FLU IMMUNIZE NO ADMIN: HCPCS | Performed by: FAMILY MEDICINE

## 2018-12-11 PROCEDURE — 4004F PT TOBACCO SCREEN RCVD TLK: CPT | Performed by: FAMILY MEDICINE

## 2018-12-11 NOTE — PROGRESS NOTES
in this encounter. No orders of the defined types were placed in this encounter. There are no discontinued medications. There are no Patient Instructions on file for this visit. Patient given educational handouts and has had all questions answered. Patient voices understanding and agrees to plans along with risks and benefits of plan. Patient isinstructed to continue prior meds, diet, and exercise plans unless instructed otherwise. Patient agrees to follow up as instructed and sooner if needed. Patient agrees to go to ER if condition becomes emergent. Notesmay be completed with dictation device and spelling errors may occur. No Follow-up on file.

## 2018-12-12 ENCOUNTER — CARE COORDINATION (OUTPATIENT)
Dept: CARE COORDINATION | Age: 38
End: 2018-12-12

## 2018-12-12 RX ORDER — AMOXICILLIN AND CLAVULANATE POTASSIUM 875; 125 MG/1; MG/1
1 TABLET, FILM COATED ORAL 2 TIMES DAILY
Qty: 20 TABLET | Refills: 0 | Status: SHIPPED | OUTPATIENT
Start: 2018-12-12 | End: 2018-12-22

## 2018-12-19 ENCOUNTER — OFFICE VISIT (OUTPATIENT)
Dept: PRIMARY CARE CLINIC | Age: 38
End: 2018-12-19
Payer: MEDICARE

## 2018-12-19 VITALS
HEART RATE: 109 BPM | DIASTOLIC BLOOD PRESSURE: 90 MMHG | HEIGHT: 66 IN | TEMPERATURE: 98.4 F | WEIGHT: 208 LBS | SYSTOLIC BLOOD PRESSURE: 144 MMHG | OXYGEN SATURATION: 98 % | BODY MASS INDEX: 33.43 KG/M2

## 2018-12-19 DIAGNOSIS — F17.210 CIGARETTE SMOKER: ICD-10-CM

## 2018-12-19 DIAGNOSIS — F31.9 BIPOLAR DEPRESSION (HCC): Primary | ICD-10-CM

## 2018-12-19 DIAGNOSIS — H69.83 ACUTE DYSFUNCTION OF EUSTACHIAN TUBE, BILATERAL: ICD-10-CM

## 2018-12-19 PROCEDURE — G8417 CALC BMI ABV UP PARAM F/U: HCPCS | Performed by: FAMILY MEDICINE

## 2018-12-19 PROCEDURE — 99213 OFFICE O/P EST LOW 20 MIN: CPT | Performed by: FAMILY MEDICINE

## 2018-12-19 PROCEDURE — 4004F PT TOBACCO SCREEN RCVD TLK: CPT | Performed by: FAMILY MEDICINE

## 2018-12-19 PROCEDURE — G8427 DOCREV CUR MEDS BY ELIG CLIN: HCPCS | Performed by: FAMILY MEDICINE

## 2018-12-19 PROCEDURE — G8484 FLU IMMUNIZE NO ADMIN: HCPCS | Performed by: FAMILY MEDICINE

## 2018-12-19 PROCEDURE — 99406 BEHAV CHNG SMOKING 3-10 MIN: CPT | Performed by: FAMILY MEDICINE

## 2018-12-19 RX ORDER — VARENICLINE TARTRATE 1 MG/1
1 TABLET, FILM COATED ORAL 2 TIMES DAILY
Qty: 60 TABLET | Refills: 3 | Status: SHIPPED | OUTPATIENT
Start: 2018-12-19 | End: 2020-07-12

## 2018-12-19 RX ORDER — BENZONATATE 100 MG/1
100 CAPSULE ORAL 3 TIMES DAILY PRN
Qty: 30 CAPSULE | Refills: 1 | Status: SHIPPED | OUTPATIENT
Start: 2018-12-19 | End: 2018-12-26

## 2018-12-19 RX ORDER — PREDNISONE 10 MG/1
30 TABLET ORAL DAILY
Qty: 15 TABLET | Refills: 0 | Status: SHIPPED | OUTPATIENT
Start: 2018-12-19 | End: 2018-12-24

## 2018-12-19 RX ORDER — LAMOTRIGINE 150 MG/1
150 TABLET ORAL DAILY
Qty: 90 TABLET | Refills: 1 | Status: SHIPPED | OUTPATIENT
Start: 2018-12-19 | End: 2019-07-22

## 2018-12-19 NOTE — PROGRESS NOTES
rhythm, normal heart sounds and intact distal pulses. Exam reveals no gallop and no friction rub. No murmur heard. Pulmonary/Chest: Effort normal and breath sounds normal. No respiratory distress. He has no wheezes. He has no rales. He exhibits no tenderness. Abdominal: Soft. Bowel sounds are normal. He exhibits no distension and no mass. There is no tenderness. There is no rebound and no guarding. Musculoskeletal:        Right lower leg: He exhibits no edema. Left lower leg: He exhibits no edema. Neurological: He is alert and oriented to person, place, and time. Coordination and gait normal.   Skin: Skin is warm and dry. He is not diaphoretic. No cyanosis. Nails show no clubbing. Psychiatric: His speech is normal and behavior is normal. Judgment normal. His mood appears not anxious. Cognition and memory are normal. He does not exhibit a depressed mood. He expresses no homicidal and no suicidal ideation. Nursing note and vitals reviewed. Assessment:    ICD-10-CM    1. [facility] F31.30    2. Acute dysfunction of Eustachian tube, bilateral H69.83    3. Cigarette smoker F17.210        Plan:   Approximately 4 minutes of education was provided about quit smoking and the harms of tobacco.  Patient does show understanding. Patient has  the desire to quit smoking in the near future. chantix prescribed. Pt higher risk but has tolerated med before, will monitor closely. In addition add steroids for b/l eustachian tube dysfunction and complete abx. Stay on current psyccj meds as he is improving. No orders of the defined types were placed in this encounter.     Orders Placed This Encounter   Medications    lamoTRIgine (LAMICTAL) 150 MG tablet     Sig: Take 1 tablet by mouth daily     Dispense:  90 tablet     Refill:  1    varenicline (CHANTIX CONTINUING MONTH CHUNG) 1 MG tablet     Sig: Take 1 tablet by mouth 2 times daily     Dispense:  60 tablet     Refill:  3    benzonatate

## 2018-12-20 ASSESSMENT — ENCOUNTER SYMPTOMS
RHINORRHEA: 1
SORE THROAT: 1
COUGH: 1

## 2018-12-26 DIAGNOSIS — M54.5 BILATERAL LOW BACK PAIN, UNSPECIFIED CHRONICITY, WITH SCIATICA PRESENCE UNSPECIFIED: ICD-10-CM

## 2018-12-26 RX ORDER — GABAPENTIN 400 MG/1
CAPSULE ORAL
Qty: 120 CAPSULE | Refills: 2 | OUTPATIENT
Start: 2018-12-26 | End: 2019-03-22 | Stop reason: SDUPTHER

## 2018-12-27 ENCOUNTER — TELEPHONE (OUTPATIENT)
Dept: CARDIOLOGY | Age: 38
End: 2018-12-27

## 2019-01-16 ENCOUNTER — OFFICE VISIT (OUTPATIENT)
Dept: CARDIOLOGY | Age: 39
End: 2019-01-16
Payer: MEDICARE

## 2019-01-16 VITALS
HEIGHT: 66 IN | HEART RATE: 88 BPM | WEIGHT: 210 LBS | DIASTOLIC BLOOD PRESSURE: 94 MMHG | BODY MASS INDEX: 33.75 KG/M2 | SYSTOLIC BLOOD PRESSURE: 164 MMHG

## 2019-01-16 DIAGNOSIS — R07.9 CHEST PAIN, UNSPECIFIED TYPE: ICD-10-CM

## 2019-01-16 DIAGNOSIS — I10 ESSENTIAL HYPERTENSION: Primary | ICD-10-CM

## 2019-01-16 PROCEDURE — 99213 OFFICE O/P EST LOW 20 MIN: CPT | Performed by: NURSE PRACTITIONER

## 2019-01-16 PROCEDURE — 93000 ELECTROCARDIOGRAM COMPLETE: CPT | Performed by: NURSE PRACTITIONER

## 2019-01-16 RX ORDER — LISINOPRIL 10 MG/1
10 TABLET ORAL DAILY
Qty: 90 TABLET | Refills: 1 | Status: SHIPPED | OUTPATIENT
Start: 2019-01-16 | End: 2019-07-03 | Stop reason: SDUPTHER

## 2019-01-21 ENCOUNTER — OFFICE VISIT (OUTPATIENT)
Dept: PRIMARY CARE CLINIC | Age: 39
End: 2019-01-21
Payer: MEDICARE

## 2019-01-21 VITALS
TEMPERATURE: 99.1 F | BODY MASS INDEX: 34.23 KG/M2 | HEIGHT: 66 IN | OXYGEN SATURATION: 99 % | WEIGHT: 213 LBS | SYSTOLIC BLOOD PRESSURE: 138 MMHG | DIASTOLIC BLOOD PRESSURE: 88 MMHG | HEART RATE: 102 BPM

## 2019-01-21 DIAGNOSIS — I10 ESSENTIAL HYPERTENSION: ICD-10-CM

## 2019-01-21 DIAGNOSIS — G89.29 CHRONIC BILATERAL LOW BACK PAIN WITH BILATERAL SCIATICA: Primary | ICD-10-CM

## 2019-01-21 DIAGNOSIS — M54.42 CHRONIC BILATERAL LOW BACK PAIN WITH BILATERAL SCIATICA: Primary | ICD-10-CM

## 2019-01-21 DIAGNOSIS — M54.41 CHRONIC BILATERAL LOW BACK PAIN WITH BILATERAL SCIATICA: Primary | ICD-10-CM

## 2019-01-21 LAB
AMPHETAMINE SCREEN, URINE: NORMAL
ANION GAP SERPL CALCULATED.3IONS-SCNC: 11 MMOL/L (ref 7–19)
BARBITURATE SCREEN, URINE: NORMAL
BENZODIAZEPINE SCREEN, URINE: NORMAL
BUN BLDV-MCNC: 9 MG/DL (ref 6–20)
BUPRENORPHINE URINE: NORMAL
CALCIUM SERPL-MCNC: 9.5 MG/DL (ref 8.6–10)
CHLORIDE BLD-SCNC: 103 MMOL/L (ref 98–111)
CO2: 26 MMOL/L (ref 22–29)
COCAINE METABOLITE SCREEN URINE: NORMAL
CREAT SERPL-MCNC: 0.9 MG/DL (ref 0.5–1.2)
GABAPENTIN SCREEN, URINE: NORMAL
GFR NON-AFRICAN AMERICAN: >60
GLUCOSE BLD-MCNC: 97 MG/DL (ref 74–109)
MDMA URINE: NORMAL
METHADONE SCREEN, URINE: NORMAL
METHAMPHETAMINE, URINE: NORMAL
OPIATE SCREEN URINE: NORMAL
OXYCODONE SCREEN URINE: NORMAL
PHENCYCLIDINE SCREEN URINE: NORMAL
POTASSIUM SERPL-SCNC: 4.4 MMOL/L (ref 3.5–5)
PROPOXYPHENE SCREEN, URINE: NORMAL
SODIUM BLD-SCNC: 140 MMOL/L (ref 136–145)
THC SCREEN, URINE: NORMAL
TRICYCLIC ANTIDEPRESSANTS, UR: NORMAL

## 2019-01-21 PROCEDURE — 4004F PT TOBACCO SCREEN RCVD TLK: CPT | Performed by: NURSE PRACTITIONER

## 2019-01-21 PROCEDURE — G8484 FLU IMMUNIZE NO ADMIN: HCPCS | Performed by: NURSE PRACTITIONER

## 2019-01-21 PROCEDURE — G8427 DOCREV CUR MEDS BY ELIG CLIN: HCPCS | Performed by: NURSE PRACTITIONER

## 2019-01-21 PROCEDURE — 99212 OFFICE O/P EST SF 10 MIN: CPT | Performed by: NURSE PRACTITIONER

## 2019-01-21 PROCEDURE — 80305 DRUG TEST PRSMV DIR OPT OBS: CPT | Performed by: NURSE PRACTITIONER

## 2019-01-21 PROCEDURE — G8417 CALC BMI ABV UP PARAM F/U: HCPCS | Performed by: NURSE PRACTITIONER

## 2019-01-21 RX ORDER — HYDROCODONE BITARTRATE AND ACETAMINOPHEN 5; 325 MG/1; MG/1
1 TABLET ORAL EVERY 8 HOURS PRN
Qty: 42 TABLET | Refills: 0 | Status: SHIPPED | OUTPATIENT
Start: 2019-01-21 | End: 2019-02-04

## 2019-01-21 ASSESSMENT — ENCOUNTER SYMPTOMS: BACK PAIN: 1

## 2019-01-25 ENCOUNTER — TELEPHONE (OUTPATIENT)
Dept: CARDIOLOGY | Age: 39
End: 2019-01-25

## 2019-01-25 RX ORDER — PRAZOSIN HYDROCHLORIDE 1 MG/1
1 CAPSULE ORAL NIGHTLY
Qty: 30 CAPSULE | Refills: 3 | Status: SHIPPED | OUTPATIENT
Start: 2019-01-25 | End: 2019-07-22 | Stop reason: SDUPTHER

## 2019-01-29 ENCOUNTER — HOSPITAL ENCOUNTER (EMERGENCY)
Facility: HOSPITAL | Age: 39
Discharge: HOME | End: 2019-01-29
Attending: EMERGENCY MEDICINE | Admitting: EMERGENCY MEDICINE

## 2019-01-29 VITALS
OXYGEN SATURATION: 100 % | WEIGHT: 214 LBS | TEMPERATURE: 97.2 F | HEIGHT: 66 IN | BODY MASS INDEX: 34.39 KG/M2 | SYSTOLIC BLOOD PRESSURE: 138 MMHG | RESPIRATION RATE: 18 BRPM | HEART RATE: 88 BPM | DIASTOLIC BLOOD PRESSURE: 78 MMHG

## 2019-01-29 DIAGNOSIS — G89.29 OTHER CHRONIC PAIN: Primary | ICD-10-CM

## 2019-01-29 PROCEDURE — 96372 THER/PROPH/DIAG INJ SC/IM: CPT

## 2019-01-29 PROCEDURE — 25010000002 MORPHINE PER 10 MG: Performed by: EMERGENCY MEDICINE

## 2019-01-29 PROCEDURE — 99283 EMERGENCY DEPT VISIT LOW MDM: CPT

## 2019-01-29 RX ORDER — MORPHINE SULFATE 2 MG/ML
6 INJECTION, SOLUTION INTRAMUSCULAR; INTRAVENOUS ONCE
Status: COMPLETED | OUTPATIENT
Start: 2019-01-29 | End: 2019-01-29

## 2019-01-29 RX ADMIN — MORPHINE SULFATE 6 MG: 2 INJECTION, SOLUTION INTRAMUSCULAR; INTRAVENOUS at 03:04

## 2019-01-31 ENCOUNTER — HOSPITAL ENCOUNTER (OUTPATIENT)
Dept: NUCLEAR MEDICINE | Age: 39
Discharge: HOME | End: 2019-02-02
Payer: MEDICARE

## 2019-01-31 ENCOUNTER — HOSPITAL ENCOUNTER (OUTPATIENT)
Dept: NON INVASIVE DIAGNOSTICS | Age: 39
Discharge: HOME | End: 2019-01-31
Payer: MEDICARE

## 2019-01-31 DIAGNOSIS — R07.9 CHEST PAIN, UNSPECIFIED TYPE: ICD-10-CM

## 2019-01-31 PROCEDURE — 6360000002 HC RX W HCPCS: Performed by: NURSE PRACTITIONER

## 2019-01-31 PROCEDURE — 93017 CV STRESS TEST TRACING ONLY: CPT

## 2019-01-31 PROCEDURE — A9500 TC99M SESTAMIBI: HCPCS | Performed by: NURSE PRACTITIONER

## 2019-01-31 PROCEDURE — 78452 HT MUSCLE IMAGE SPECT MULT: CPT

## 2019-01-31 PROCEDURE — 3430000000 HC RX DIAGNOSTIC RADIOPHARMACEUTICAL: Performed by: NURSE PRACTITIONER

## 2019-01-31 RX ADMIN — TETRAKIS(2-METHOXYISOBUTYLISOCYANIDE)COPPER(I) TETRAFLUOROBORATE 10 MILLICURIE: 1 INJECTION, POWDER, LYOPHILIZED, FOR SOLUTION INTRAVENOUS at 13:00

## 2019-01-31 RX ADMIN — TETRAKIS(2-METHOXYISOBUTYLISOCYANIDE)COPPER(I) TETRAFLUOROBORATE 30 MILLICURIE: 1 INJECTION, POWDER, LYOPHILIZED, FOR SOLUTION INTRAVENOUS at 12:59

## 2019-01-31 RX ADMIN — REGADENOSON 0.4 MG: 0.08 INJECTION, SOLUTION INTRAVENOUS at 13:00

## 2019-02-05 LAB
LV EF: 65 %
LVEF MODALITY: NORMAL

## 2019-02-08 ENCOUNTER — TELEPHONE (OUTPATIENT)
Dept: CARDIOLOGY | Age: 39
End: 2019-02-08

## 2019-02-21 RX ORDER — PROMETHAZINE HYDROCHLORIDE 25 MG/1
25 TABLET ORAL EVERY 6 HOURS PRN
Qty: 20 TABLET | Refills: 0 | Status: SHIPPED | OUTPATIENT
Start: 2019-02-21 | End: 2019-02-28

## 2019-03-01 ENCOUNTER — OFFICE VISIT (OUTPATIENT)
Dept: CARDIOLOGY | Age: 39
End: 2019-03-01
Payer: MEDICARE

## 2019-03-01 VITALS
HEIGHT: 66 IN | DIASTOLIC BLOOD PRESSURE: 84 MMHG | BODY MASS INDEX: 33.27 KG/M2 | HEART RATE: 94 BPM | SYSTOLIC BLOOD PRESSURE: 128 MMHG | WEIGHT: 207 LBS

## 2019-03-01 DIAGNOSIS — R00.2 HEART PALPITATIONS: ICD-10-CM

## 2019-03-01 DIAGNOSIS — R06.09 DYSPNEA ON EXERTION: ICD-10-CM

## 2019-03-01 DIAGNOSIS — I10 ESSENTIAL HYPERTENSION: Primary | ICD-10-CM

## 2019-03-01 PROCEDURE — 99214 OFFICE O/P EST MOD 30 MIN: CPT | Performed by: NURSE PRACTITIONER

## 2019-03-01 PROCEDURE — 0296T PR EXT ECG > 48HR TO 21 DAY RCRD W/CONECT INTL RCRD: CPT | Performed by: NURSE PRACTITIONER

## 2019-03-04 ENCOUNTER — TELEPHONE (OUTPATIENT)
Dept: PRIMARY CARE CLINIC | Age: 39
End: 2019-03-04

## 2019-03-04 NOTE — TELEPHONE ENCOUNTER
Pt called in requesting Dr. Dylon Harrison would call him in some pain medication until his Pain Management appt thurs.   Please call pt if this is ok or not

## 2019-03-05 ENCOUNTER — HOSPITAL ENCOUNTER (EMERGENCY)
Facility: HOSPITAL | Age: 39
Discharge: HOME | End: 2019-03-05
Admitting: EMERGENCY MEDICINE

## 2019-03-05 VITALS
HEART RATE: 85 BPM | TEMPERATURE: 98.5 F | DIASTOLIC BLOOD PRESSURE: 90 MMHG | OXYGEN SATURATION: 96 % | SYSTOLIC BLOOD PRESSURE: 141 MMHG | RESPIRATION RATE: 16 BRPM | WEIGHT: 209 LBS | BODY MASS INDEX: 33.59 KG/M2 | HEIGHT: 66 IN

## 2019-03-05 DIAGNOSIS — M54.41 CHRONIC LOW BACK PAIN WITH BILATERAL SCIATICA, UNSPECIFIED BACK PAIN LATERALITY: Primary | ICD-10-CM

## 2019-03-05 DIAGNOSIS — G89.29 CHRONIC LOW BACK PAIN WITH BILATERAL SCIATICA, UNSPECIFIED BACK PAIN LATERALITY: Primary | ICD-10-CM

## 2019-03-05 DIAGNOSIS — M54.42 CHRONIC LOW BACK PAIN WITH BILATERAL SCIATICA, UNSPECIFIED BACK PAIN LATERALITY: Primary | ICD-10-CM

## 2019-03-05 PROCEDURE — 99283 EMERGENCY DEPT VISIT LOW MDM: CPT

## 2019-03-05 PROCEDURE — 25010000002 METHYLPREDNISOLONE PER 125 MG: Performed by: EMERGENCY MEDICINE

## 2019-03-05 PROCEDURE — 96372 THER/PROPH/DIAG INJ SC/IM: CPT

## 2019-03-05 PROCEDURE — 25010000002 MORPHINE PER 10 MG: Performed by: EMERGENCY MEDICINE

## 2019-03-05 RX ORDER — ONDANSETRON 4 MG/1
4 TABLET, ORALLY DISINTEGRATING ORAL ONCE
Status: COMPLETED | OUTPATIENT
Start: 2019-03-05 | End: 2019-03-05

## 2019-03-05 RX ORDER — METHYLPREDNISOLONE SODIUM SUCCINATE 125 MG/2ML
125 INJECTION, POWDER, LYOPHILIZED, FOR SOLUTION INTRAMUSCULAR; INTRAVENOUS ONCE
Status: DISCONTINUED | OUTPATIENT
Start: 2019-03-05 | End: 2019-03-05 | Stop reason: HOSPADM

## 2019-03-05 RX ADMIN — ONDANSETRON 4 MG: 4 TABLET, ORALLY DISINTEGRATING ORAL at 18:24

## 2019-03-05 RX ADMIN — MORPHINE SULFATE 4 MG: 4 INJECTION INTRAVENOUS at 18:24

## 2019-03-05 NOTE — ED PROVIDER NOTES
"Subjective     Back Pain   Associated symptoms: no fever      Patient is a pleasant 39-year-old male with chief complaint of recurrent back pain.  Patient describes a history of chronic back pain for nearly 30 years.  He had scoliosis when he was a little boy.  He had surgical intervention at the age of 13.  He lives in chronic pain.  He had surgery again 4 years ago with more rods and screws placed.  He reports he is currently awaiting management by pain management.  In the meantime, he has had \"good days and bad days.\"  He reports he has had the same pain that he has experienced before.  He denies being too exertional with his activity.  He lifts no more than 10 pounds.  He denies any bladder or bowel dysfunction.  He denies any saddle paresthesia.  He is able to ambulate without any difficulty.    Review of Systems   Constitutional: Negative for activity change, appetite change and fever.   HENT: Negative.    Respiratory: Negative.    Cardiovascular: Negative.    Gastrointestinal: Negative.  Negative for diarrhea.   Genitourinary: Negative.  Negative for difficulty urinating.   Musculoskeletal: Positive for back pain.   Neurological: Negative.    Psychiatric/Behavioral: Negative.    All other systems reviewed and are negative.      Past Medical History:   Diagnosis Date   • Back pain    • Hypertension    • Neuropathy        Allergies   Allergen Reactions   • Darvon [Propoxyphene]    • Dilaudid [Hydromorphone Hcl]    • Flexeril [Cyclobenzaprine]    • Naproxen    • Nubain [Nalbuphine Hcl]    • Toradol [Ketorolac Tromethamine]        Past Surgical History:   Procedure Laterality Date   • BACK SURGERY         No family history on file.    Social History     Socioeconomic History   • Marital status: Single     Spouse name: Not on file   • Number of children: Not on file   • Years of education: Not on file   • Highest education level: Not on file   Tobacco Use   • Smoking status: Current Every Day Smoker     Packs/day: " "1.00   Substance and Sexual Activity   • Alcohol use: No   • Drug use: No       Prior to Admission medications    Medication Sig Start Date End Date Taking? Authorizing Provider   gabapentin (NEURONTIN) 400 MG capsule Take 400 mg by mouth 3 (Three) Times a Day.    Kiersten Salcedo MD   lisinopril (PRINIVIL,ZESTRIL) 10 MG tablet Take 10 mg by mouth Daily.    Kiersten Salcedo MD   MethylPREDNISolone (MEDROL, JAYLIN,) 4 MG tablet Take as directed on package instructions. 8/24/17   Ivan Hennessy MD   ondansetron ODT (ZOFRAN-ODT) 4 MG disintegrating tablet Take 1 tablet by mouth 4 (Four) Times a Day As Needed for Nausea or Vomiting. 8/24/17   Ivan Hennessy MD   oxyCODONE-acetaminophen (PERCOCET) 7.5-325 MG per tablet Take 1 tablet by mouth Every 8 (Eight) Hours As Needed.    ProviderKiersten MD       Medications   morphine injection 4 mg (not administered)   methylPREDNISolone sodium succinate (SOLU-Medrol) injection 125 mg (not administered)   ondansetron ODT (ZOFRAN-ODT) disintegrating tablet 4 mg (not administered)       /95 (BP Location: Right arm, Patient Position: Sitting)   Pulse 80   Temp 98.2 °F (36.8 °C) (Temporal)   Resp 18   Ht 167.6 cm (66\")   Wt 94.8 kg (209 lb)   SpO2 98%   BMI 33.73 kg/m²       Objective   Physical Exam   Constitutional: He is oriented to person, place, and time. He appears well-developed and well-nourished.   HENT:   Head: Normocephalic and atraumatic.   Right Ear: External ear normal.   Left Ear: External ear normal.   Nose: Nose normal.   Mouth/Throat: Oropharynx is clear and moist.   Eyes: Conjunctivae and EOM are normal. Pupils are equal, round, and reactive to light.   Neck: Normal range of motion. Neck supple. No tracheal deviation present.   Cardiovascular: Normal rate, regular rhythm, normal heart sounds and intact distal pulses. Exam reveals no gallop and no friction rub.   No murmur heard.  Pulmonary/Chest: Effort normal and breath sounds " normal. No respiratory distress. He has no wheezes. He has no rales. He exhibits no tenderness.   Abdominal: Soft. Bowel sounds are normal. He exhibits no distension and no mass. There is no tenderness. There is no rebound and no guarding.   Musculoskeletal: Normal range of motion. He exhibits no edema, tenderness or deformity.        Cervical back: Normal.        Thoracic back: Normal.        Lumbar back: Normal.   Neurological: He is alert and oriented to person, place, and time. He has normal strength and normal reflexes. No cranial nerve deficit or sensory deficit. He exhibits normal muscle tone. Coordination and gait normal.   Negative straight leg test   Skin: Skin is warm and dry. Capillary refill takes less than 2 seconds. No rash noted. No erythema. No pallor.   Psychiatric: He has a normal mood and affect. His behavior is normal. Judgment and thought content normal.   Vitals reviewed.      Procedures         Lab Results (last 24 hours)     ** No results found for the last 24 hours. **          No results found.    ED Course        Patient does not present with any type of cord abnormality.  He has chronic pain this presents similarly.  He understands that pain medication will not be sent home with him.  He has been advised to continue follow-up with pain management.  He voiced understanding will be discharged in stable condition.  MDM    Final diagnoses:   Chronic low back pain with bilateral sciatica, unspecified back pain laterality          Barbara Ley PA  03/05/19 [id number]

## 2019-03-05 NOTE — DISCHARGE INSTRUCTIONS
Chronic Back Pain  When back pain lasts longer than 3 months, it is called chronic back pain. The cause of your back pain may not be known. Some common causes include:  · Wear and tear (degenerative disease) of the bones, ligaments, or disks in your back.  · Inflammation and stiffness in your back (arthritis).    People who have chronic back pain often go through certain periods in which the pain is more intense (flare-ups). Many people can learn to manage the pain with home care.  Follow these instructions at home:  Pay attention to any changes in your symptoms. Take these actions to help with your pain:  Activity  · Avoid bending and activities that make the problem worse.  · Do not sit or  one place for long periods of time.  · Take brief periods of rest throughout the day. This will reduce your pain. Resting in a lying or standing position is usually better than sitting to rest.  · When you are resting for longer periods, mix in some mild activity or stretching between periods of rest. This will help to prevent stiffness and pain.  · Get regular exercise. Ask your health care provider what activities are safe for you.  · Do not lift anything that is heavier than 10 lb (4.5 kg). Always use proper lifting technique, which includes:  ? Bending your knees.  ? Keeping the load close to your body.  ? Avoiding twisting.  Managing pain  · If directed, apply ice to the painful area. Your health care provider may recommend applying ice during the first 24-48 hours after a flare-up begins.  ? Put ice in a plastic bag.  ? Place a towel between your skin and the bag.  ? Leave the ice on for 20 minutes, 2-3 times per day.  · After icing, apply heat to the affected area as often as told by your health care provider. Use the heat source that your health care provider recommends, such as a moist heat pack or a heating pad.  ? Place a towel between your skin and the heat source.  ? Leave the heat on for 20-30  minutes.  ? Remove the heat if your skin turns bright red. This is especially important if you are unable to feel pain, heat, or cold. You may have a greater risk of getting burned.  · Try soaking in a warm tub.  · Take over-the-counter and prescription medicines only as told by your health care provider.  · Keep all follow-up visits as told by your health care provider. This is important.  Contact a health care provider if:  · You have pain that is not relieved with rest or medicine.  Get help right away if:  · You have weakness or numbness in one or both of your legs or feet.  · You have trouble controlling your bladder or your bowels.  · You have nausea or vomiting.  · You have pain in your abdomen.  · You have shortness of breath or you faint.  This information is not intended to replace advice given to you by your health care provider. Make sure you discuss any questions you have with your health care provider.  Document Released: 01/25/2006 Document Revised: 04/27/2017 Document Reviewed: 06/06/2016  [employer] Interactive Patient Education © 2018 [employer]

## 2019-03-05 NOTE — ED NOTES
Patient is a 39 year old male that presents to ER that complaints of lower back pain. Patient has a history of chronic back pain and scoliosis. Patient reports that he went to his chiropractor yesterday for an adjustment. Patient reports that when he was walking in the house yesterday he felt a pop in his lower back. Patient reports numbness to his legs.      Chadwick Leonardo RN  03/05/19 0396

## 2019-03-07 ENCOUNTER — HOSPITAL ENCOUNTER (OUTPATIENT)
Dept: PAIN MANAGEMENT | Age: 39
Discharge: HOME | End: 2019-03-07
Payer: MEDICARE

## 2019-03-18 ENCOUNTER — CARE COORDINATION (OUTPATIENT)
Dept: CARE COORDINATION | Age: 39
End: 2019-03-18

## 2019-03-22 DIAGNOSIS — M54.5 BILATERAL LOW BACK PAIN, UNSPECIFIED CHRONICITY, WITH SCIATICA PRESENCE UNSPECIFIED: ICD-10-CM

## 2019-03-22 RX ORDER — GABAPENTIN 400 MG/1
CAPSULE ORAL
Qty: 120 CAPSULE | Refills: 2 | OUTPATIENT
Start: 2019-03-22 | End: 2019-03-26

## 2019-03-24 ENCOUNTER — HOSPITAL ENCOUNTER (EMERGENCY)
Facility: HOSPITAL | Age: 39
Discharge: HOME | End: 2019-03-24
Admitting: NURSE PRACTITIONER

## 2019-03-24 VITALS
WEIGHT: 212 LBS | HEART RATE: 96 BPM | OXYGEN SATURATION: 96 % | RESPIRATION RATE: 18 BRPM | HEIGHT: 66 IN | BODY MASS INDEX: 34.07 KG/M2 | SYSTOLIC BLOOD PRESSURE: 131 MMHG | DIASTOLIC BLOOD PRESSURE: 87 MMHG | TEMPERATURE: 99.2 F

## 2019-03-24 DIAGNOSIS — G89.29 CHRONIC NECK PAIN: Primary | ICD-10-CM

## 2019-03-24 DIAGNOSIS — M54.50 CHRONIC MIDLINE LOW BACK PAIN WITHOUT SCIATICA: ICD-10-CM

## 2019-03-24 DIAGNOSIS — G89.29 CHRONIC MIDLINE LOW BACK PAIN WITHOUT SCIATICA: ICD-10-CM

## 2019-03-24 DIAGNOSIS — M54.2 CHRONIC NECK PAIN: Primary | ICD-10-CM

## 2019-03-24 PROCEDURE — 96372 THER/PROPH/DIAG INJ SC/IM: CPT

## 2019-03-24 PROCEDURE — 25010000002 ONDANSETRON PER 1 MG: Performed by: NURSE PRACTITIONER

## 2019-03-24 PROCEDURE — 25010000002 MORPHINE PER 10 MG: Performed by: NURSE PRACTITIONER

## 2019-03-24 PROCEDURE — 99283 EMERGENCY DEPT VISIT LOW MDM: CPT

## 2019-03-24 RX ORDER — METHYLPREDNISOLONE 4 MG/1
TABLET ORAL
Qty: 21 EACH | Refills: 0 | OUTPATIENT
Start: 2019-03-24 | End: 2019-07-19

## 2019-03-24 RX ORDER — ONDANSETRON 2 MG/ML
4 INJECTION INTRAMUSCULAR; INTRAVENOUS ONCE
Status: COMPLETED | OUTPATIENT
Start: 2019-03-24 | End: 2019-03-24

## 2019-03-24 RX ORDER — TIZANIDINE HYDROCHLORIDE 2 MG/1
2 CAPSULE, GELATIN COATED ORAL 3 TIMES DAILY PRN
Qty: 15 CAPSULE | Refills: 0 | Status: SHIPPED | OUTPATIENT
Start: 2019-03-24 | End: 2020-03-10

## 2019-03-24 RX ADMIN — MORPHINE SULFATE 4 MG: 4 INJECTION INTRAVENOUS at 17:52

## 2019-03-24 RX ADMIN — ONDANSETRON 4 MG: 2 INJECTION INTRAMUSCULAR; INTRAVENOUS at 17:52

## 2019-03-26 DIAGNOSIS — M54.5 BILATERAL LOW BACK PAIN, UNSPECIFIED CHRONICITY, WITH SCIATICA PRESENCE UNSPECIFIED: ICD-10-CM

## 2019-03-26 RX ORDER — GABAPENTIN 400 MG/1
CAPSULE ORAL
Qty: 120 CAPSULE | Refills: 2 | OUTPATIENT
Start: 2019-03-26 | End: 2019-09-09 | Stop reason: SDUPTHER

## 2019-05-29 ENCOUNTER — APPOINTMENT (OUTPATIENT)
Dept: CT IMAGING | Facility: HOSPITAL | Age: 39
End: 2019-05-29

## 2019-05-29 ENCOUNTER — HOSPITAL ENCOUNTER (EMERGENCY)
Facility: HOSPITAL | Age: 39
Discharge: HOME | End: 2019-05-29
Attending: EMERGENCY MEDICINE | Admitting: EMERGENCY MEDICINE

## 2019-05-29 VITALS
TEMPERATURE: 98 F | OXYGEN SATURATION: 98 % | RESPIRATION RATE: 16 BRPM | WEIGHT: 208 LBS | BODY MASS INDEX: 33.43 KG/M2 | SYSTOLIC BLOOD PRESSURE: 149 MMHG | HEIGHT: 66 IN | DIASTOLIC BLOOD PRESSURE: 96 MMHG | HEART RATE: 74 BPM

## 2019-05-29 DIAGNOSIS — G89.29 EXACERBATION OF CHRONIC BACK PAIN: Primary | ICD-10-CM

## 2019-05-29 DIAGNOSIS — M54.9 EXACERBATION OF CHRONIC BACK PAIN: Primary | ICD-10-CM

## 2019-05-29 PROCEDURE — 96372 THER/PROPH/DIAG INJ SC/IM: CPT

## 2019-05-29 PROCEDURE — 25010000002 MORPHINE PER 10 MG: Performed by: EMERGENCY MEDICINE

## 2019-05-29 PROCEDURE — 99283 EMERGENCY DEPT VISIT LOW MDM: CPT

## 2019-05-29 PROCEDURE — 72128 CT CHEST SPINE W/O DYE: CPT

## 2019-05-29 PROCEDURE — 72131 CT LUMBAR SPINE W/O DYE: CPT

## 2019-05-29 RX ORDER — MORPHINE SULFATE 2 MG/ML
6 INJECTION, SOLUTION INTRAMUSCULAR; INTRAVENOUS ONCE
Status: DISCONTINUED | OUTPATIENT
Start: 2019-05-29 | End: 2019-05-29 | Stop reason: SDUPTHER

## 2019-05-29 RX ORDER — HYDROCODONE BITARTRATE AND ACETAMINOPHEN 7.5; 325 MG/1; MG/1
1 TABLET ORAL EVERY 8 HOURS PRN
Qty: 8 TABLET | Refills: 0 | OUTPATIENT
Start: 2019-05-29 | End: 2019-07-19

## 2019-05-29 RX ORDER — MORPHINE SULFATE 4 MG/ML
6 INJECTION, SOLUTION INTRAMUSCULAR; INTRAVENOUS ONCE
Status: COMPLETED | OUTPATIENT
Start: 2019-05-29 | End: 2019-05-29

## 2019-05-29 RX ADMIN — MORPHINE SULFATE 6 MG: 4 INJECTION INTRAVENOUS at 13:14

## 2019-07-03 RX ORDER — LAMOTRIGINE 100 MG/1
100 TABLET ORAL DAILY
Qty: 90 TABLET | Refills: 3 | Status: SHIPPED | OUTPATIENT
Start: 2019-07-03 | End: 2020-07-12

## 2019-07-03 RX ORDER — LISINOPRIL 10 MG/1
10 TABLET ORAL DAILY
Qty: 90 TABLET | Refills: 1 | Status: SHIPPED | OUTPATIENT
Start: 2019-07-03 | End: 2019-07-22 | Stop reason: SDUPTHER

## 2019-07-03 RX ORDER — ARIPIPRAZOLE 10 MG/1
10 TABLET ORAL DAILY
Qty: 90 TABLET | Refills: 0 | Status: SHIPPED | OUTPATIENT
Start: 2019-07-03 | End: 2019-11-01 | Stop reason: SDUPTHER

## 2019-07-19 ENCOUNTER — APPOINTMENT (OUTPATIENT)
Dept: GENERAL RADIOLOGY | Facility: HOSPITAL | Age: 39
End: 2019-07-19

## 2019-07-19 ENCOUNTER — HOSPITAL ENCOUNTER (EMERGENCY)
Facility: HOSPITAL | Age: 39
Discharge: HOME | End: 2019-07-19
Admitting: EMERGENCY MEDICINE

## 2019-07-19 VITALS
BODY MASS INDEX: 33.43 KG/M2 | OXYGEN SATURATION: 100 % | HEART RATE: 89 BPM | HEIGHT: 66 IN | DIASTOLIC BLOOD PRESSURE: 93 MMHG | SYSTOLIC BLOOD PRESSURE: 137 MMHG | TEMPERATURE: 98.6 F | WEIGHT: 208 LBS | RESPIRATION RATE: 17 BRPM

## 2019-07-19 DIAGNOSIS — M54.42 ACUTE MIDLINE LOW BACK PAIN WITH LEFT-SIDED SCIATICA: Primary | ICD-10-CM

## 2019-07-19 PROCEDURE — 72110 X-RAY EXAM L-2 SPINE 4/>VWS: CPT

## 2019-07-19 PROCEDURE — 99283 EMERGENCY DEPT VISIT LOW MDM: CPT

## 2019-07-19 RX ORDER — METHYLPREDNISOLONE 4 MG/1
TABLET ORAL
Qty: 21 TABLET | Refills: 0 | Status: SHIPPED | OUTPATIENT
Start: 2019-07-19 | End: 2020-03-10

## 2019-07-19 RX ORDER — HYDROCODONE BITARTRATE AND ACETAMINOPHEN 5; 325 MG/1; MG/1
1-2 TABLET ORAL EVERY 6 HOURS PRN
Qty: 10 TABLET | Refills: 0 | OUTPATIENT
Start: 2019-07-19 | End: 2019-08-17

## 2019-07-19 RX ORDER — HYDROCODONE BITARTRATE AND ACETAMINOPHEN 5; 325 MG/1; MG/1
1 TABLET ORAL ONCE
Status: COMPLETED | OUTPATIENT
Start: 2019-07-19 | End: 2019-07-19

## 2019-07-19 RX ADMIN — HYDROCODONE BITARTRATE AND ACETAMINOPHEN 1 TABLET: 5; 325 TABLET ORAL at 15:35

## 2019-07-22 ENCOUNTER — OFFICE VISIT (OUTPATIENT)
Dept: PRIMARY CARE CLINIC | Age: 39
End: 2019-07-22
Payer: MEDICARE

## 2019-07-22 VITALS
DIASTOLIC BLOOD PRESSURE: 90 MMHG | OXYGEN SATURATION: 99 % | WEIGHT: 210 LBS | TEMPERATURE: 98 F | RESPIRATION RATE: 20 BRPM | HEIGHT: 66 IN | SYSTOLIC BLOOD PRESSURE: 140 MMHG | BODY MASS INDEX: 33.75 KG/M2 | HEART RATE: 110 BPM

## 2019-07-22 DIAGNOSIS — F31.9 BIPOLAR DEPRESSION (HCC): ICD-10-CM

## 2019-07-22 DIAGNOSIS — G89.4 CHRONIC PAIN SYNDROME: Primary | ICD-10-CM

## 2019-07-22 DIAGNOSIS — F51.04 INSOMNIA, PSYCHOPHYSIOLOGICAL: ICD-10-CM

## 2019-07-22 DIAGNOSIS — I10 ESSENTIAL HYPERTENSION: ICD-10-CM

## 2019-07-22 DIAGNOSIS — F15.11 METHAMPHETAMINE ABUSE IN REMISSION (HCC): ICD-10-CM

## 2019-07-22 PROCEDURE — G8427 DOCREV CUR MEDS BY ELIG CLIN: HCPCS | Performed by: FAMILY MEDICINE

## 2019-07-22 PROCEDURE — 99214 OFFICE O/P EST MOD 30 MIN: CPT | Performed by: FAMILY MEDICINE

## 2019-07-22 PROCEDURE — G8417 CALC BMI ABV UP PARAM F/U: HCPCS | Performed by: FAMILY MEDICINE

## 2019-07-22 PROCEDURE — 4004F PT TOBACCO SCREEN RCVD TLK: CPT | Performed by: FAMILY MEDICINE

## 2019-07-22 RX ORDER — LISINOPRIL 40 MG/1
40 TABLET ORAL DAILY
Qty: 90 TABLET | Refills: 1 | Status: SHIPPED | OUTPATIENT
Start: 2019-07-22 | End: 2020-07-12

## 2019-07-22 RX ORDER — PRAZOSIN HYDROCHLORIDE 2 MG/1
2 CAPSULE ORAL NIGHTLY
Qty: 30 CAPSULE | Refills: 1 | Status: SHIPPED | OUTPATIENT
Start: 2019-07-22 | End: 2019-12-02 | Stop reason: SDUPTHER

## 2019-07-22 NOTE — PROGRESS NOTES
Kala Lilly is a 44 y.o. male who presents today for   Chief Complaint   Patient presents with    Referral - General       HPI  Patient is here for for pain today. Notes he is having more pain. He was with pain management previously but no longer is. He does have a history of bipolar depression as well. It has improved overall but still has issues of agitation. Blood pressure is uncontrolled today as well. Patient notes he is having some issues with sleep and that certain medications have irritated them previously but he cannot recall what those were. No change in PMH, family, social, or surgical history unless mentioned above. Review of Systems   Constitutional: Negative for chills, fever and unexpected weight change. Respiratory: Negative for cough, chest tightness, shortness of breath and wheezing. Cardiovascular: Negative for chest pain, palpitations and leg swelling. Gastrointestinal: Negative for abdominal pain, constipation, diarrhea, nausea and vomiting. Genitourinary: Negative for difficulty urinating, dysuria, enuresis and frequency. Musculoskeletal: Positive for back pain. Negative for gait problem. Neurological: Negative for weakness and numbness. Psychiatric/Behavioral: Positive for dysphoric mood and sleep disturbance.           Past Medical History:   Diagnosis Date    Back pain     Chest pain     Scoliosis     Tobacco abuse     Vitamin D deficiency        Current Outpatient Medications   Medication Sig Dispense Refill    prazosin (MINIPRESS) 2 MG capsule Take 1 capsule by mouth nightly 30 capsule 1    lisinopril (PRINIVIL;ZESTRIL) 40 MG tablet Take 1 tablet by mouth daily 90 tablet 1    lamoTRIgine (LAMICTAL) 100 MG tablet TAKE 1 TABLET BY MOUTH DAILY 90 tablet 3    ARIPiprazole (ABILIFY) 10 MG tablet TAKE 1 TABLET BY MOUTH DAILY 90 tablet 0    varenicline (CHANTIX CONTINUING MONTH CHUNG) 1 MG tablet Take 1 tablet by mouth 2 times daily 60 tablet 3    cetirizine

## 2019-07-28 ENCOUNTER — APPOINTMENT (OUTPATIENT)
Dept: GENERAL RADIOLOGY | Facility: HOSPITAL | Age: 39
End: 2019-07-28

## 2019-07-28 ENCOUNTER — HOSPITAL ENCOUNTER (EMERGENCY)
Facility: HOSPITAL | Age: 39
Discharge: HOME | End: 2019-07-28
Admitting: EMERGENCY MEDICINE

## 2019-07-28 VITALS
WEIGHT: 204 LBS | HEART RATE: 96 BPM | OXYGEN SATURATION: 99 % | RESPIRATION RATE: 18 BRPM | BODY MASS INDEX: 32.78 KG/M2 | TEMPERATURE: 98.1 F | DIASTOLIC BLOOD PRESSURE: 94 MMHG | SYSTOLIC BLOOD PRESSURE: 157 MMHG | HEIGHT: 66 IN

## 2019-07-28 DIAGNOSIS — M54.6 MIDLINE THORACIC BACK PAIN, UNSPECIFIED CHRONICITY: Primary | ICD-10-CM

## 2019-07-28 PROCEDURE — 25010000002 METHYLPREDNISOLONE PER 125 MG: Performed by: PHYSICIAN ASSISTANT

## 2019-07-28 PROCEDURE — 96372 THER/PROPH/DIAG INJ SC/IM: CPT

## 2019-07-28 PROCEDURE — 99282 EMERGENCY DEPT VISIT SF MDM: CPT

## 2019-07-28 PROCEDURE — 99281 EMR DPT VST MAYX REQ PHY/QHP: CPT

## 2019-07-28 RX ORDER — METHYLPREDNISOLONE SODIUM SUCCINATE 125 MG/2ML
125 INJECTION, POWDER, LYOPHILIZED, FOR SOLUTION INTRAMUSCULAR; INTRAVENOUS ONCE
Status: COMPLETED | OUTPATIENT
Start: 2019-07-28 | End: 2019-07-28

## 2019-07-28 RX ADMIN — METHYLPREDNISOLONE SODIUM SUCCINATE 125 MG: 125 INJECTION, POWDER, FOR SOLUTION INTRAMUSCULAR; INTRAVENOUS at 16:45

## 2019-07-30 PROBLEM — F15.11 METHAMPHETAMINE ABUSE IN REMISSION (HCC): Status: ACTIVE | Noted: 2019-07-30

## 2019-07-30 ASSESSMENT — ENCOUNTER SYMPTOMS
VOMITING: 0
CHEST TIGHTNESS: 0
COUGH: 0
ABDOMINAL PAIN: 0
BACK PAIN: 1
DIARRHEA: 0
NAUSEA: 0
CONSTIPATION: 0
SHORTNESS OF BREATH: 0
WHEEZING: 0

## 2019-08-17 ENCOUNTER — HOSPITAL ENCOUNTER (EMERGENCY)
Facility: HOSPITAL | Age: 39
Discharge: HOME | End: 2019-08-17
Admitting: EMERGENCY MEDICINE

## 2019-08-17 VITALS
RESPIRATION RATE: 18 BRPM | BODY MASS INDEX: 34.39 KG/M2 | DIASTOLIC BLOOD PRESSURE: 96 MMHG | OXYGEN SATURATION: 97 % | TEMPERATURE: 97.7 F | SYSTOLIC BLOOD PRESSURE: 148 MMHG | WEIGHT: 214 LBS | HEART RATE: 94 BPM | HEIGHT: 66 IN

## 2019-08-17 DIAGNOSIS — G89.29 EXACERBATION OF CHRONIC BACK PAIN: Primary | ICD-10-CM

## 2019-08-17 DIAGNOSIS — M54.9 EXACERBATION OF CHRONIC BACK PAIN: Primary | ICD-10-CM

## 2019-08-17 PROCEDURE — 25010000002 MORPHINE PER 10 MG: Performed by: PHYSICIAN ASSISTANT

## 2019-08-17 PROCEDURE — 96372 THER/PROPH/DIAG INJ SC/IM: CPT

## 2019-08-17 PROCEDURE — 25010000002 MORPHINE PER 10 MG: Performed by: EMERGENCY MEDICINE

## 2019-08-17 PROCEDURE — 25010000002 METHYLPREDNISOLONE PER 125 MG: Performed by: EMERGENCY MEDICINE

## 2019-08-17 PROCEDURE — 99284 EMERGENCY DEPT VISIT MOD MDM: CPT

## 2019-08-17 RX ORDER — MORPHINE SULFATE 2 MG/ML
6 INJECTION, SOLUTION INTRAMUSCULAR; INTRAVENOUS ONCE
Status: DISCONTINUED | OUTPATIENT
Start: 2019-08-17 | End: 2019-08-17

## 2019-08-17 RX ORDER — HYDROCODONE BITARTRATE AND ACETAMINOPHEN 5; 325 MG/1; MG/1
1 TABLET ORAL EVERY 6 HOURS PRN
Qty: 12 TABLET | Refills: 0 | OUTPATIENT
Start: 2019-08-17 | End: 2019-09-22

## 2019-08-17 RX ORDER — MORPHINE SULFATE 10 MG/ML
6 INJECTION INTRAMUSCULAR; INTRAVENOUS; SUBCUTANEOUS ONCE
Status: COMPLETED | OUTPATIENT
Start: 2019-08-17 | End: 2019-08-17

## 2019-08-17 RX ORDER — METHYLPREDNISOLONE SODIUM SUCCINATE 125 MG/2ML
80 INJECTION, POWDER, LYOPHILIZED, FOR SOLUTION INTRAMUSCULAR; INTRAVENOUS ONCE
Status: COMPLETED | OUTPATIENT
Start: 2019-08-17 | End: 2019-08-17

## 2019-08-17 RX ADMIN — METHYLPREDNISOLONE SODIUM SUCCINATE 80 MG: 125 INJECTION, POWDER, FOR SOLUTION INTRAMUSCULAR; INTRAVENOUS at 18:15

## 2019-08-17 RX ADMIN — MORPHINE SULFATE 6 MG: 10 INJECTION INTRAVENOUS at 18:15

## 2019-08-17 RX ADMIN — MORPHINE SULFATE 6 MG: 10 INJECTION INTRAVENOUS at 17:08

## 2019-08-17 NOTE — ED PROVIDER NOTES
Subjective   39-year-old  white male [facility].  Reports onset of symptoms upon awakening this morning.  He states that he has a history of chronic thoracic and low back pain with history of scoliosis.  He has had 3 previous surgery of the thoracic area.  He denies any specific trauma states that is progressively worsened since onset this morning.  He does complain of left leg sciatica which is similar to his chronic pain.  He denies saddle anesthesia, numbness, weakness, urinary or rectal incontinence or retention.  Further denies fever, chills, nausea, vomiting.  Rest improves movement exacerbates his symptoms.        History provided by:  Patient   used: No        Review of Systems   Constitutional: Negative for chills, diaphoresis, fatigue and fever.   HENT: Negative for congestion and trouble swallowing.    Respiratory: Negative for shortness of breath and wheezing.    Cardiovascular: Negative for chest pain and palpitations.   Gastrointestinal: Negative for abdominal pain, diarrhea and nausea.   Genitourinary: Negative for dysuria.   Musculoskeletal: Positive for back pain. Negative for arthralgias and myalgias.   Neurological: Negative for dizziness and numbness.   Hematological: Negative for adenopathy. Does not bruise/bleed easily.       Past Medical History:   Diagnosis Date   • Back pain    • Hypertension    • Neuropathy        Allergies   Allergen Reactions   • Darvon [Propoxyphene]    • Dilaudid [Hydromorphone Hcl]    • Flexeril [Cyclobenzaprine]    • Naproxen    • Nubain [Nalbuphine Hcl]    • Toradol [Ketorolac Tromethamine]    • Zanaflex [Tizanidine Hcl] Other (See Comments)     Restless Leg        Past Surgical History:   Procedure Laterality Date   • BACK SURGERY         History reviewed. No pertinent family history.    Social History     Socioeconomic History   • Marital status: Significant Other     Spouse name: Not on file   • Number of  children: Not on file   • Years of education: Not on file   • Highest education level: Not on file   Tobacco Use   • Smoking status: Current Every Day Smoker     Packs/day: 1.00   Substance and Sexual Activity   • Alcohol use: No   • Drug use: No       Lab Results (last 24 hours)     ** No results found for the last 24 hours. **          Objective   Physical Exam   Constitutional: He is oriented to person, place, and time. He appears well-developed and well-nourished. No distress.   HENT:   Head: Normocephalic and atraumatic.   Right Ear: External ear normal.   Left Ear: External ear normal.   Eyes: Conjunctivae and EOM are normal. Pupils are equal, round, and reactive to light. Right eye exhibits no discharge. Left eye exhibits no discharge. No scleral icterus.   Neck: Normal range of motion. Neck supple. No tracheal deviation present. No thyromegaly present.   Cardiovascular: Normal rate, regular rhythm, normal heart sounds and intact distal pulses. Exam reveals no friction rub.   No murmur heard.  Pulmonary/Chest: Effort normal and breath sounds normal. No respiratory distress. He has no wheezes.   Abdominal: Soft. Bowel sounds are normal. He exhibits no distension. There is no tenderness. There is no guarding.   Musculoskeletal:        Thoracic back: He exhibits decreased range of motion, tenderness and pain.        Lumbar back: He exhibits decreased range of motion, tenderness, pain and spasm.   Tenderness of the right sided thoracic and lumbar area.  There is associated muscular tenderness.  No specific or obvious step-offs or deformities noted.  Positive straight leg raise on the left side at 20 to 30 degrees.  Old surgical incision scars.  No areas of infection including warmth or erythema.  Proprioception intact.  Able to ambulate without difficulty.   Neurological: He is alert and oriented to person, place, and time.   Skin: Skin is warm and dry. Capillary refill takes less than 2 seconds. He is not  "diaphoretic.   Psychiatric: He has a normal mood and affect. His behavior is normal.   Nursing note and vitals reviewed.      Procedures         No orders to display       /80   Pulse 104   Temp 98.5 °F (36.9 °C) (Oral)   Resp 16   Ht 167.6 cm (66\")   Wt 97.1 kg (214 lb)   SpO2 98%   BMI 34.54 kg/m²     ED Course    ED Course as of Aug 17 1919   Sat Aug 17, 2019   1651 IMPRESSION:  1. Extensive postsurgical changes in the thoracolumbar junction.  2. 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  3. Degenerative changes lower thoracic spine.  4. No acute osseous abnormality.  This report was finalized on 05/29/2019 13:20 by Dr. Rodrigo Watson MD.  Imaging     CT Thoracic Spine Without Contrast (Order: [id number]) - 5/29/2019     [CP]   1651    IMPRESSION:  Impression:   1. 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  2. Pre-existing left lateral fusion construct has a broken eric between  the L2 and L3 levels.   3. The right fusion eric for the posterior construct is a running into  the right sacral ala, possibly because of the increasing lordotic  curvature.  4. No acute fracture.  5. Facet arthropathy with neuroforaminal narrowing at the L5-S1 level.  6. Lumbosacral transitional anatomy.     This report was finalized on 05/29/2019 13:22 by Dr Hayes Rodriguez, .  Imaging     CT Lumbar Spine Without Contrast (Order: [id number]) - 5/29/2019     [CP]   1858 [id number]  [facility] query complete. Treatment plan to include limited course of prescribed  controlled substance. Risks including addiction, benefits, and alternatives presented to patient.     [CP]      ED Course User Index  [CP] Messi Wilkinson PA-C       Medications   morphine injection 6 mg (6 mg Intramuscular Given 8/17/19 1708)   Morphine injection 6 mg (6 mg Intramuscular Given 8/17/19 1815)   methylPREDNISolone sodium succinate (SOLU-Medrol) injection 80 mg " (80 mg Intramuscular Given 8/17/19 1815)            MDM  Number of Diagnoses or Management Options  Exacerbation of chronic back pain:   Diagnosis management comments:    This is a 39-year-old  male.  He has acute on chronic back pain with previous surgeries.  Has been seen in this emergency department 4 times for exacerbations of this pain.  Patient had a normal neurologic exam and no signs of cauda equina.  Pain did improve with treatment.  Patient will be discharged home with a short course of Lortab to follow-up with his back surgeon as soon as possible.  He is to return immediately for any worsening or new pain, fever, numbness, weakness or other concerns.  Patient was agreeable to the plan.  He had no fever or signs of infection.             Amount and/or Complexity of Data Reviewed  Decide to obtain previous medical records or to obtain history from someone other than the patient: yes  Review and summarize past medical records: yes  Independent visualization of images, tracings, or specimens: yes    Risk of Complications, Morbidity, and/or Mortality  Presenting problems: moderate  Diagnostic procedures: moderate  Management options: moderate    Patient Progress  Patient progress: stable      Final diagnoses:   Exacerbation of chronic back pain          Messi Wilkinson PA-C  08/17/19 1445

## 2019-08-18 NOTE — DISCHARGE INSTRUCTIONS
It is very important to follow-up with your neurosurgeon for further evaluation of the symptoms.  Return to the ER should he develop any new, worsening or other concerning symptoms such as as listed below.      Contact a health care provider if:  You have pain that is not relieved with rest or medicine.  Get help right away if:  You have weakness or numbness in one or both of your legs or feet.  You have trouble controlling your bladder or your bowels.  You have nausea or vomiting.  You have pain in your abdomen.  You have shortness of breath or you faint.   no (get order)

## 2019-09-22 ENCOUNTER — APPOINTMENT (OUTPATIENT)
Dept: CT IMAGING | Facility: HOSPITAL | Age: 39
End: 2019-09-22

## 2019-09-22 ENCOUNTER — APPOINTMENT (OUTPATIENT)
Dept: GENERAL RADIOLOGY | Facility: HOSPITAL | Age: 39
End: 2019-09-22

## 2019-09-22 ENCOUNTER — HOSPITAL ENCOUNTER (EMERGENCY)
Facility: HOSPITAL | Age: 39
Discharge: HOME | End: 2019-09-22
Admitting: EMERGENCY MEDICINE

## 2019-09-22 VITALS
HEART RATE: 80 BPM | SYSTOLIC BLOOD PRESSURE: 142 MMHG | TEMPERATURE: 98.6 F | RESPIRATION RATE: 16 BRPM | BODY MASS INDEX: 34.72 KG/M2 | DIASTOLIC BLOOD PRESSURE: 97 MMHG | HEIGHT: 66 IN | OXYGEN SATURATION: 96 % | WEIGHT: 216 LBS

## 2019-09-22 DIAGNOSIS — M54.6 ACUTE MIDLINE THORACIC BACK PAIN: ICD-10-CM
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
## 2019-09-22 DIAGNOSIS — M54.41 ACUTE MIDLINE LOW BACK PAIN WITH RIGHT-SIDED SCIATICA: ICD-10-CM

## 2019-09-22 PROCEDURE — 72128 CT CHEST SPINE W/O DYE: CPT

## 2019-09-22 PROCEDURE — 96372 THER/PROPH/DIAG INJ SC/IM: CPT

## 2019-09-22 PROCEDURE — 73523 X-RAY EXAM HIPS BI 5/> VIEWS: CPT

## 2019-09-22 PROCEDURE — 25010000002 MORPHINE SULFATE (PF) 2 MG/ML SOLUTION: Performed by: PHYSICIAN ASSISTANT

## 2019-09-22 PROCEDURE — 99283 EMERGENCY DEPT VISIT LOW MDM: CPT

## 2019-09-22 PROCEDURE — 72131 CT LUMBAR SPINE W/O DYE: CPT

## 2019-09-22 RX ORDER — HYDROCODONE BITARTRATE AND ACETAMINOPHEN 5; 325 MG/1; MG/1
1 TABLET ORAL EVERY 8 HOURS PRN
Qty: 12 TABLET | Refills: 0 | Status: SHIPPED | OUTPATIENT
Start: 2019-09-22 | End: 2019-10-04

## 2019-09-22 RX ORDER — MORPHINE SULFATE 2 MG/ML
6 INJECTION, SOLUTION INTRAMUSCULAR; INTRAVENOUS ONCE
Status: COMPLETED | OUTPATIENT
Start: 2019-09-22 | End: 2019-09-22

## 2019-09-22 RX ORDER — METHYLPREDNISOLONE SODIUM SUCCINATE 125 MG/2ML
125 INJECTION, POWDER, LYOPHILIZED, FOR SOLUTION INTRAMUSCULAR; INTRAVENOUS ONCE
Status: DISCONTINUED | OUTPATIENT
Start: 2019-09-22 | End: 2019-09-22 | Stop reason: HOSPADM

## 2019-09-22 RX ORDER — MORPHINE SULFATE 2 MG/ML
6 INJECTION, SOLUTION INTRAMUSCULAR; INTRAVENOUS ONCE
Status: DISCONTINUED | OUTPATIENT
Start: 2019-09-22 | End: 2019-09-22

## 2019-09-22 RX ADMIN — MORPHINE SULFATE 6 MG: 2 INJECTION, SOLUTION INTRAMUSCULAR; INTRAVENOUS at 16:51

## 2019-09-22 NOTE — ED PROVIDER NOTES
Subjective     History provided by:  Patient   used: No    Back Pain   Location:  Thoracic spine, lumbar spine and sacro-iliac joint  Quality:  Aching  Radiates to:  Does not radiate  Pain severity:  Moderate  Onset quality:  Sudden  Duration:  2 hours  Timing:  Constant  Progression:  Worsening  Chronicity:  New  Context: falling and recent injury    Relieved by:  None tried  Worsened by:  Ambulation and bending  Ineffective treatments:  None tried  Associated symptoms: leg pain    Associated symptoms: no abdominal pain, no abdominal swelling, no bladder incontinence, no bowel incontinence, no chest pain, no dysuria, no fever, no headaches, no numbness, no paresthesias, no pelvic pain, no perianal numbness, no tingling, no weakness and no weight loss    Risk factors: steroid use        Review of Systems   Constitutional: Negative for chills, diaphoresis, fatigue, fever and weight loss.   HENT: Negative for congestion and trouble swallowing.    Respiratory: Negative for shortness of breath and wheezing.    Cardiovascular: Negative for chest pain and palpitations.   Gastrointestinal: Negative for abdominal pain, bowel incontinence, diarrhea and nausea.   Genitourinary: Negative for bladder incontinence, dysuria and pelvic pain.   Musculoskeletal: Positive for back pain. Negative for arthralgias and myalgias.   Neurological: Negative for dizziness, tingling, weakness, numbness, headaches and paresthesias.   Hematological: Negative for adenopathy. Does not bruise/bleed easily.       Past Medical History:   Diagnosis Date   • Back pain    • Hypertension    • Neuropathy        Allergies   Allergen Reactions   • Darvon [Propoxyphene]    • Dilaudid [Hydromorphone Hcl]    • Flexeril [Cyclobenzaprine]    • Naproxen    • Nubain [Nalbuphine Hcl]    • Toradol [Ketorolac Tromethamine]    • Zanaflex [Tizanidine Hcl] Other (See Comments)     Restless Leg        Past Surgical History:   Procedure Laterality Date    • BACK SURGERY         No family history on file.    Social History     Socioeconomic History   • Marital status: Significant Other     Spouse name: Not on file   • Number of children: Not on file   • Years of education: Not on file   • Highest education level: Not on file   Tobacco Use   • Smoking status: Current Every Day Smoker     Packs/day: 1.00   Substance and Sexual Activity   • Alcohol use: No   • Drug use: No       Lab Results (last 24 hours)     ** No results found for the last 24 hours. **          Objective   Physical Exam   Constitutional: He is oriented to person, place, and time. He appears well-developed and well-nourished. No distress.   HENT:   Head: Normocephalic and atraumatic.   Right Ear: External ear normal.   Left Ear: External ear normal.   Eyes: Conjunctivae and EOM are normal. Pupils are equal, round, and reactive to light. Right eye exhibits no discharge. Left eye exhibits no discharge. No scleral icterus.   Neck: Normal range of motion. Neck supple. No tracheal deviation present. No thyromegaly present.   Cardiovascular: Normal rate, regular rhythm, normal heart sounds and intact distal pulses. Exam reveals no friction rub.   No murmur heard.  Pulmonary/Chest: Effort normal and breath sounds normal. No respiratory distress. He has no wheezes.   Abdominal: Soft. Bowel sounds are normal. He exhibits no distension. There is no tenderness. There is no guarding.   Musculoskeletal:   Line tenderness of the thoracic and lumbar area.  No obvious deformities or step-offs.  Tenderness of the right hip.  Neurovascular intact distally 5 out of 5 strength in the bilateral lower extremities no obvious sensory deficits.  Able to ambulate in the room although this does cause him more pain.  2+ patellar reflexes.   Neurological: He is alert and oriented to person, place, and time.   Skin: Skin is warm and dry. Capillary refill takes less than 2 seconds. He is not diaphoretic.   Psychiatric: He has a  "normal mood and affect. His behavior is normal.   Nursing note and vitals reviewed.      Procedures         CT Lumbar Spine Without Contrast   Final Result   Impression:    1. Extensive postoperative changes of the lower thoracic and lumbar   spine through the upper sacrum. Overall stable appearance of the lumbar   spine from previous study of 05/29/2019. No evidence of acute fracture   line.           This report was finalized on 09/22/2019 17:49 by Dr. Andriy Patel MD.      CT Thoracic Spine Without Contrast   Final Result   1. No evidence of acute fracture or subluxation.   2. 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 There is also endplate intrusions at T8 with   spondylosis and paravertebral spurring at the T7-T8, T8-T9 and T9-T10   level. There is no central or foraminal compromise.   3. Overall stable appearance of the thoracic spine from previous study   of 05/29/2019.       This report was finalized on 09/22/2019 17:42 by Dr. Andriy Patel MD.      XR Hips Bilateral With or Without Pelvis 5 View   Final Result   Impression:    1. No visualized fracture or malalignment.   2. Moderate osteoarthritis change at the right hip.           This report was finalized on 09/22/2019 16:47 by Dr Hayes Rodriguez, .          /97 (BP Location: Left arm, Patient Position: Sitting)   Pulse 104   Temp 98.7 °F (37.1 °C)   Resp 16   Ht 167.6 cm (66\")   Wt 98 kg (216 lb)   SpO2 96%   BMI 34.86 kg/m²     ED Course    ED Course as of Sep 22 1821   Sun Sep 22, 2019   1658 Impression    Impression:   1. No visualized fracture or malalignment.  2. Moderate osteoarthritis change at the right hip.        This report was finalized on 09/22/2019 16:47 by Dr Hayes Rodriguez, .        Specimen Collected: 09/22/19 16:45 Last Resulted: 09/22/19 16:47        [CP]   1802 Impression    Impression:   1. Extensive postoperative changes of the lower thoracic and lumbar  spine " through the upper sacrum. Overall stable appearance of the lumbar  spine from previous study of 05/29/2019. No evidence of acute fracture  line.        This report was finalized on 09/22/2019 17:49 by Dr. Andriy Patel MD.        Specimen Collected: 09/22/19 17:42 Last Resulted: 09/22/19 17:49        [CP]   1802 Impression    1. No evidence of acute fracture or subluxation.  2. 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 There is also endplate intrusions at T8 with  spondylosis and paravertebral spurring at the T7-T8, T8-T9 and T9-T10  level. There is no central or foraminal compromise.  3. Overall stable appearance of the thoracic spine from previous study  of 05/29/2019.     This report was finalized on 09/22/2019 17:42 by Dr. Andriy Patel MD.        Specimen Collected: 09/22/19 17:37 Last Resulted: 09/22/19 17:42        [CP]   1815 [facility] query complete. Treatment plan to include limited course of prescribed  controlled substance. Risks including addiction, benefits, and alternatives presented to patient. [id number]    [CP]      ED Course User Index  [CP] Messi Wilkinson PA-C       Medications   methylPREDNISolone sodium succinate (SOLU-Medrol) injection 125 mg (125 mg Intramuscular Not Given 9/22/19 1652)   morphine injection 6 mg (6 mg Intramuscular Given 9/22/19 1651)            MDM  Number of Diagnoses or Management Options  Acute midline low back pain with right-sided sciatica: new and requires workup  Acute midline thoracic back pain: new and requires workup  Fall, initial encounter: new and requires workup  Diagnosis management comments: 39-year-old with fall to the right side of 3 foot porch.  Complains of acute on chronic midline thoracic, lumbar, right hip pain.  No acute fractures or dislocations.  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  However stable from last CT scans.  No red flag symptoms or  neurological deficits.  Patient is able to ambulate without difficulty.  Will give short course of pain medication and have follow-up with his surgeon.  Time of discharge improved symptoms and ambulating without difficulty.  All questions answered.       Amount and/or Complexity of Data Reviewed  Tests in the radiology section of CPT®: ordered and reviewed  Discuss the patient with other providers: yes    Patient Progress  Patient progress: stable      Final diagnoses:   Fall, initial encounter   Acute midline thoracic back pain   Acute midline low back pain with right-sided sciatica          Messi Wilkinson PA-C  09/22/19 0155

## 2019-09-22 NOTE — DISCHARGE INSTRUCTIONS
Patient with acute on chronic midline thoracic and lumbar pain and arthritis of the right hip.  Scans are negative for any acute fractures or dislocations are.  Please follow-up with your orthopedic provider in the next 2 to 3 days if your symptoms do not improve.  We will treat you symptomatically at this time.  However return to the ER should you develop any new, worsening or other concerning symptoms such as those listed below.      Contact a health care provider if:  Your pain and other symptoms get worse.  Your pain medicine is not helping.  Your pain has not improved after a few weeks of home care.  You have a fever.  Get help right away if:  You have severe pain, weakness, or numbness.  You have difficulty with bladder or bowel control.

## 2019-11-06 ENCOUNTER — OFFICE VISIT (OUTPATIENT)
Dept: PRIMARY CARE CLINIC | Age: 39
End: 2019-11-06
Payer: MEDICARE

## 2019-11-06 VITALS
TEMPERATURE: 98.5 F | BODY MASS INDEX: 34.06 KG/M2 | WEIGHT: 211 LBS | DIASTOLIC BLOOD PRESSURE: 68 MMHG | SYSTOLIC BLOOD PRESSURE: 128 MMHG | HEART RATE: 113 BPM | OXYGEN SATURATION: 99 %

## 2019-11-06 DIAGNOSIS — G62.9 PERIPHERAL POLYNEUROPATHY: Primary | ICD-10-CM

## 2019-11-06 PROCEDURE — G8417 CALC BMI ABV UP PARAM F/U: HCPCS | Performed by: FAMILY MEDICINE

## 2019-11-06 PROCEDURE — 99213 OFFICE O/P EST LOW 20 MIN: CPT | Performed by: FAMILY MEDICINE

## 2019-11-06 PROCEDURE — G8484 FLU IMMUNIZE NO ADMIN: HCPCS | Performed by: FAMILY MEDICINE

## 2019-11-06 PROCEDURE — G8428 CUR MEDS NOT DOCUMENT: HCPCS | Performed by: FAMILY MEDICINE

## 2019-11-06 PROCEDURE — 4004F PT TOBACCO SCREEN RCVD TLK: CPT | Performed by: FAMILY MEDICINE

## 2019-11-06 RX ORDER — GABAPENTIN 600 MG/1
TABLET ORAL
Qty: 120 TABLET | Refills: 5 | Status: SHIPPED | OUTPATIENT
Start: 2019-11-06 | End: 2020-07-12

## 2019-11-06 ASSESSMENT — ENCOUNTER SYMPTOMS
SHORTNESS OF BREATH: 0
CHEST TIGHTNESS: 0
NAUSEA: 0
COUGH: 0
WHEEZING: 0
ABDOMINAL PAIN: 0
DIARRHEA: 0
VOMITING: 0
CONSTIPATION: 0

## 2019-11-16 ENCOUNTER — HOSPITAL ENCOUNTER (EMERGENCY)
Facility: HOSPITAL | Age: 39
Discharge: HOME | End: 2019-11-16
Attending: EMERGENCY MEDICINE | Admitting: EMERGENCY MEDICINE

## 2019-11-16 VITALS
SYSTOLIC BLOOD PRESSURE: 147 MMHG | HEART RATE: 110 BPM | WEIGHT: 216 LBS | RESPIRATION RATE: 16 BRPM | OXYGEN SATURATION: 97 % | TEMPERATURE: 97.9 F | DIASTOLIC BLOOD PRESSURE: 83 MMHG | BODY MASS INDEX: 34.72 KG/M2 | HEIGHT: 66 IN

## 2019-11-16 DIAGNOSIS — M54.50 CHRONIC MIDLINE LOW BACK PAIN WITHOUT SCIATICA: Primary | ICD-10-CM

## 2019-11-16 DIAGNOSIS — G89.29 CHRONIC MIDLINE LOW BACK PAIN WITHOUT SCIATICA: Primary | ICD-10-CM

## 2019-11-16 PROCEDURE — 96372 THER/PROPH/DIAG INJ SC/IM: CPT

## 2019-11-16 PROCEDURE — 99283 EMERGENCY DEPT VISIT LOW MDM: CPT

## 2019-11-16 PROCEDURE — 25010000002 ONDANSETRON PER 1 MG: Performed by: EMERGENCY MEDICINE

## 2019-11-16 PROCEDURE — 25010000002 MORPHINE PER 10 MG: Performed by: EMERGENCY MEDICINE

## 2019-11-16 RX ORDER — ONDANSETRON 2 MG/ML
4 INJECTION INTRAMUSCULAR; INTRAVENOUS ONCE
Status: COMPLETED | OUTPATIENT
Start: 2019-11-16 | End: 2019-11-16

## 2019-11-16 RX ORDER — MORPHINE SULFATE 10 MG/ML
8 INJECTION INTRAMUSCULAR; INTRAVENOUS; SUBCUTANEOUS ONCE
Status: COMPLETED | OUTPATIENT
Start: 2019-11-16 | End: 2019-11-16

## 2019-11-16 RX ADMIN — MORPHINE SULFATE 8 MG: 10 INJECTION, SOLUTION INTRAMUSCULAR; INTRAVENOUS at 09:31

## 2019-11-16 RX ADMIN — ONDANSETRON HYDROCHLORIDE 4 MG: 2 SOLUTION INTRAMUSCULAR; INTRAVENOUS at 09:31

## 2019-11-16 NOTE — ED PROVIDER NOTES
Subjective   Patient complains of pain in his low back.  He says he got up this morning and turned the wrong way and something popped in his back and now is having significant pain in his back.  He has a history of 3 back surgeries and has titanium screws and rods in his back.  This is happened to him several times before.  He gets gabapentin for chronic control the pain but does not have any pain medication at home so he comes to the ER to get some relief.  He denies any radiation of the pain into his legs or problems with bowel or bladder.  He denies any significant fall or other injury.        History provided by:  Patient   used: No    Back Pain   Location:  Lumbar spine  Quality:  Aching  Radiates to:  Does not radiate  Pain severity:  Severe  Pain is:  Same all the time  Onset quality:  Sudden  Duration:  2 hours  Timing:  Constant  Progression:  Unchanged  Chronicity:  Recurrent  Context: twisting    Context: not emotional stress, not falling, not jumping from heights, not lifting heavy objects, not MCA, not MVA, not occupational injury, not pedestrian accident, not physical stress, not recent illness and not recent injury    Relieved by:  Nothing  Worsened by:  Nothing  Ineffective treatments:  None tried  Associated symptoms: no abdominal pain, no abdominal swelling, no chest pain, no dysuria, no fever, no headaches, no leg pain, no numbness, no pelvic pain, no perianal numbness, no tingling, no weakness and no weight loss    Risk factors: no hx of cancer, no hx of osteoporosis, no lack of exercise, no menopause, not obese, not pregnant, no recent surgery, no steroid use and no vascular disease        Review of Systems   Constitutional: Negative.  Negative for fever and weight loss.   HENT: Negative.    Respiratory: Negative.    Cardiovascular: Negative.  Negative for chest pain.   Gastrointestinal: Negative.  Negative for abdominal pain.   Genitourinary: Negative.  Negative for dysuria  and pelvic pain.   Musculoskeletal: Positive for back pain.   Neurological: Negative.  Negative for tingling, weakness, numbness and headaches.   Psychiatric/Behavioral: Negative.    All other systems reviewed and are negative.      Past Medical History:   Diagnosis Date   • Back pain    • Hypertension    • Neuropathy        Allergies   Allergen Reactions   • Darvon [Propoxyphene]    • Dilaudid [Hydromorphone Hcl]    • Flexeril [Cyclobenzaprine]    • Naproxen    • Nubain [Nalbuphine Hcl]    • Toradol [Ketorolac Tromethamine]    • Zanaflex [Tizanidine Hcl] Other (See Comments)     Restless Leg        Past Surgical History:   Procedure Laterality Date   • BACK SURGERY         No family history on file.    Social History     Socioeconomic History   • Marital status: Significant Other     Spouse name: Not on file   • Number of children: Not on file   • Years of education: Not on file   • Highest education level: Not on file   Tobacco Use   • Smoking status: Current Every Day Smoker     Packs/day: 1.00   Substance and Sexual Activity   • Alcohol use: No   • Drug use: No           Objective   Physical Exam   Constitutional: He is oriented to person, place, and time. He appears well-developed and well-nourished.   HENT:   Head: Normocephalic and atraumatic.   Neck: Normal range of motion. Neck supple.   Musculoskeletal: Normal range of motion. He exhibits tenderness.   Patient is tender to palpation lumbosacral region of his back but there is no bony deformity palpated.  He has full use function of his limitation.   Neurological: He is alert and oriented to person, place, and time.   Psychiatric: He has a normal mood and affect. His behavior is normal.   Nursing note and vitals reviewed.      Procedures           ED Course  ED Course as of Nov 16 0926   Sat Nov 16, 2019 0924 I told the patient that I would give him some pain relief today but he needed to follow-up with his family physician for better control of this pain  or referral to pain management.  He says he has been referred to pain management but nobody has contacted him since names I stressed to him that he needed to make the calls to them to push that issue along as opposed to waiting them them to call him back.  I have told the ER cannot be the place he comes in for intermittent pain control for this chronic pain.  When I look at his record he did not use to come here at all but this year he has had increasing visits to the ER and this is a 6 visit to the ER in the past 6 months for the same flareup of his back pain and that is something we cannot continue to do.  I will give him some relief today but needs to follow-up with his family physician.  He is discharged in stable condition.  [TR]      ED Course User Index  [TR] Henri Chowdary Jr., MD                  [facility]      Final diagnoses:   Chronic midline low back pain without sciatica              Henri Chowdary Jr., MD  11/16/19 0982

## 2019-12-02 RX ORDER — PRAZOSIN HYDROCHLORIDE 2 MG/1
2 CAPSULE ORAL NIGHTLY
Qty: 30 CAPSULE | Refills: 1 | Status: SHIPPED | OUTPATIENT
Start: 2019-12-02 | End: 2020-02-04

## 2019-12-02 RX ORDER — ARIPIPRAZOLE 10 MG/1
10 TABLET ORAL DAILY
Qty: 30 TABLET | Refills: 0 | Status: SHIPPED | OUTPATIENT
Start: 2019-12-02 | End: 2019-12-30

## 2019-12-12 ENCOUNTER — OFFICE VISIT (OUTPATIENT)
Dept: PRIMARY CARE CLINIC | Age: 39
End: 2019-12-12
Payer: MEDICARE

## 2019-12-12 VITALS
DIASTOLIC BLOOD PRESSURE: 98 MMHG | HEIGHT: 66 IN | BODY MASS INDEX: 35.52 KG/M2 | WEIGHT: 221 LBS | SYSTOLIC BLOOD PRESSURE: 150 MMHG | OXYGEN SATURATION: 98 % | HEART RATE: 94 BPM | RESPIRATION RATE: 16 BRPM | TEMPERATURE: 97.7 F

## 2019-12-12 DIAGNOSIS — G62.9 PERIPHERAL POLYNEUROPATHY: ICD-10-CM

## 2019-12-12 DIAGNOSIS — F31.9 BIPOLAR DEPRESSION (HCC): ICD-10-CM

## 2019-12-12 DIAGNOSIS — I10 UNCONTROLLED HYPERTENSION: Primary | ICD-10-CM

## 2019-12-12 PROCEDURE — 4004F PT TOBACCO SCREEN RCVD TLK: CPT | Performed by: FAMILY MEDICINE

## 2019-12-12 PROCEDURE — G8484 FLU IMMUNIZE NO ADMIN: HCPCS | Performed by: FAMILY MEDICINE

## 2019-12-12 PROCEDURE — G8427 DOCREV CUR MEDS BY ELIG CLIN: HCPCS | Performed by: FAMILY MEDICINE

## 2019-12-12 PROCEDURE — G8417 CALC BMI ABV UP PARAM F/U: HCPCS | Performed by: FAMILY MEDICINE

## 2019-12-12 PROCEDURE — 99214 OFFICE O/P EST MOD 30 MIN: CPT | Performed by: FAMILY MEDICINE

## 2019-12-12 RX ORDER — LISINOPRIL AND HYDROCHLOROTHIAZIDE 20; 12.5 MG/1; MG/1
2 TABLET ORAL DAILY
Qty: 180 TABLET | Refills: 1 | Status: SHIPPED | OUTPATIENT
Start: 2019-12-12 | End: 2020-07-12

## 2019-12-12 ASSESSMENT — ENCOUNTER SYMPTOMS
WHEEZING: 0
NAUSEA: 0
VOMITING: 0
ABDOMINAL PAIN: 0
SHORTNESS OF BREATH: 0
COUGH: 0
RHINORRHEA: 1
DIARRHEA: 0
CHEST TIGHTNESS: 0
CONSTIPATION: 0

## 2020-02-04 RX ORDER — PRAZOSIN HYDROCHLORIDE 2 MG/1
2 CAPSULE ORAL NIGHTLY
Qty: 30 CAPSULE | Refills: 1 | Status: SHIPPED | OUTPATIENT
Start: 2020-02-04 | End: 2020-03-16

## 2020-03-10 ENCOUNTER — APPOINTMENT (OUTPATIENT)
Dept: GENERAL RADIOLOGY | Facility: HOSPITAL | Age: 40
End: 2020-03-10

## 2020-03-10 ENCOUNTER — HOSPITAL ENCOUNTER (EMERGENCY)
Facility: HOSPITAL | Age: 40
Discharge: HOME | End: 2020-03-10
Admitting: EMERGENCY MEDICINE

## 2020-03-10 VITALS
DIASTOLIC BLOOD PRESSURE: 91 MMHG | SYSTOLIC BLOOD PRESSURE: 132 MMHG | HEIGHT: 66 IN | HEART RATE: 85 BPM | TEMPERATURE: 97.8 F | OXYGEN SATURATION: 96 % | RESPIRATION RATE: 16 BRPM | BODY MASS INDEX: 34.39 KG/M2 | WEIGHT: 214 LBS

## 2020-03-10 DIAGNOSIS — S39.012A BACK STRAIN, INITIAL ENCOUNTER: Primary | ICD-10-CM

## 2020-03-10 PROCEDURE — 72110 X-RAY EXAM L-2 SPINE 4/>VWS: CPT

## 2020-03-10 PROCEDURE — 96372 THER/PROPH/DIAG INJ SC/IM: CPT

## 2020-03-10 PROCEDURE — 99283 EMERGENCY DEPT VISIT LOW MDM: CPT

## 2020-03-10 PROCEDURE — 25010000002 DEXAMETHASONE PER 1 MG: Performed by: NURSE PRACTITIONER

## 2020-03-10 RX ORDER — METHYLPREDNISOLONE 4 MG/1
TABLET ORAL
Qty: 1 EACH | Refills: 0 | Status: SHIPPED | OUTPATIENT
Start: 2020-03-10 | End: 2020-07-10

## 2020-03-10 RX ORDER — HYDROCODONE BITARTRATE AND ACETAMINOPHEN 7.5; 325 MG/1; MG/1
1 TABLET ORAL ONCE
Status: COMPLETED | OUTPATIENT
Start: 2020-03-10 | End: 2020-03-10

## 2020-03-10 RX ORDER — DEXAMETHASONE SODIUM PHOSPHATE 10 MG/ML
10 INJECTION INTRAMUSCULAR; INTRAVENOUS ONCE
Status: COMPLETED | OUTPATIENT
Start: 2020-03-10 | End: 2020-03-10

## 2020-03-10 RX ADMIN — DEXAMETHASONE SODIUM PHOSPHATE 10 MG: 10 INJECTION INTRAMUSCULAR; INTRAVENOUS at 01:14

## 2020-03-10 RX ADMIN — HYDROCODONE BITARTRATE AND ACETAMINOPHEN 1 TABLET: 7.5; 325 TABLET ORAL at 01:13

## 2020-03-14 ENCOUNTER — APPOINTMENT (OUTPATIENT)
Dept: GENERAL RADIOLOGY | Age: 40
End: 2020-03-14
Payer: MEDICARE

## 2020-03-14 ENCOUNTER — HOSPITAL ENCOUNTER (EMERGENCY)
Age: 40
Discharge: HOME | End: 2020-03-14
Payer: MEDICARE

## 2020-03-14 VITALS
OXYGEN SATURATION: 95 % | DIASTOLIC BLOOD PRESSURE: 83 MMHG | RESPIRATION RATE: 20 BRPM | SYSTOLIC BLOOD PRESSURE: 135 MMHG | TEMPERATURE: 98.6 F | HEART RATE: 101 BPM

## 2020-03-14 PROCEDURE — 96372 THER/PROPH/DIAG INJ SC/IM: CPT

## 2020-03-14 PROCEDURE — 6370000000 HC RX 637 (ALT 250 FOR IP): Performed by: NURSE PRACTITIONER

## 2020-03-14 PROCEDURE — 73521 X-RAY EXAM HIPS BI 2 VIEWS: CPT

## 2020-03-14 PROCEDURE — 6360000002 HC RX W HCPCS: Performed by: NURSE PRACTITIONER

## 2020-03-14 PROCEDURE — 72100 X-RAY EXAM L-S SPINE 2/3 VWS: CPT

## 2020-03-14 PROCEDURE — 99283 EMERGENCY DEPT VISIT LOW MDM: CPT

## 2020-03-14 RX ORDER — LIDOCAINE 4 G/G
1 PATCH TOPICAL DAILY
Status: DISCONTINUED | OUTPATIENT
Start: 2020-03-14 | End: 2020-03-14 | Stop reason: HOSPADM

## 2020-03-14 RX ORDER — ORPHENADRINE CITRATE 30 MG/ML
60 INJECTION INTRAMUSCULAR; INTRAVENOUS ONCE
Status: COMPLETED | OUTPATIENT
Start: 2020-03-14 | End: 2020-03-14

## 2020-03-14 RX ORDER — LIDOCAINE 50 MG/G
1 PATCH TOPICAL DAILY
Qty: 6 PATCH | Refills: 0 | Status: SHIPPED | OUTPATIENT
Start: 2020-03-14 | End: 2020-03-20

## 2020-03-14 RX ORDER — METHOCARBAMOL 500 MG/1
500 TABLET, FILM COATED ORAL 3 TIMES DAILY
Qty: 20 TABLET | Refills: 0 | Status: SHIPPED | OUTPATIENT
Start: 2020-03-14 | End: 2020-03-21

## 2020-03-14 RX ORDER — MORPHINE SULFATE 4 MG/ML
4 INJECTION, SOLUTION INTRAMUSCULAR; INTRAVENOUS ONCE
Status: COMPLETED | OUTPATIENT
Start: 2020-03-14 | End: 2020-03-14

## 2020-03-14 RX ADMIN — ORPHENADRINE CITRATE 60 MG: 30 INJECTION INTRAMUSCULAR; INTRAVENOUS at 14:40

## 2020-03-14 RX ADMIN — Medication 4 MG: at 14:40

## 2020-03-14 ASSESSMENT — PAIN SCALES - GENERAL
PAINLEVEL_OUTOF10: 10
PAINLEVEL_OUTOF10: 10
PAINLEVEL_OUTOF10: 6

## 2020-03-14 ASSESSMENT — ENCOUNTER SYMPTOMS
BACK PAIN: 1
ABDOMINAL PAIN: 0

## 2020-03-14 NOTE — ED PROVIDER NOTES
medications which have NOT CHANGED    Details   prazosin (MINIPRESS) 2 MG capsule TAKE 1 CAPSULE BY MOUTH NIGHTLY, Disp-30 capsule, R-1Normal      ARIPiprazole (ABILIFY) 10 MG tablet TAKE 1 TABLET BY MOUTH DAILY, Disp-30 tablet, R-2Normal      lisinopril-hydrochlorothiazide (PRINZIDE;ZESTORETIC) 20-12.5 MG per tablet Take 2 tablets by mouth daily, Disp-180 tablet, R-1Normal      gabapentin (NEURONTIN) 600 MG tablet 1 cap am and afternoon and 2 caps at bedtime, Disp-120 tablet, R-5Normal      lisinopril (PRINIVIL;ZESTRIL) 40 MG tablet Take 1 tablet by mouth daily, Disp-90 tablet, R-1Normal      lamoTRIgine (LAMICTAL) 100 MG tablet TAKE 1 TABLET BY MOUTH DAILY, Disp-90 tablet, R-3Normal      varenicline (CHANTIX CONTINUING MONTH CHUNG) 1 MG tablet Take 1 tablet by mouth 2 times daily, Disp-60 tablet, R-3Normal      cetirizine (ZYRTEC) 10 MG tablet Take 1 tablet by mouth daily, Disp-90 tablet, R-3Normal      Cholecalciferol (VITAMIN D3) 5000 units CAPS 1 tab/cap daily, Disp-90 capsule, R-5Normal      ergocalciferol (ERGOCALCIFEROL) 26246 units capsule Take 1 capsule by mouth once a week, Disp-12 capsule, R-2Normal      cyanocobalamin 500 MCG tablet Take 1 tablet by mouth daily, Disp-90 tablet, R-0Normal             ALLERGIES     Flexeril [cyclobenzaprine]; Toradol [ketorolac tromethamine];  Demerol hcl [meperidine]; and Other    FAMILY HISTORY       Family History   Problem Relation Age of Onset    Heart Disease Mother     Hypertension Mother     Cancer Father         prostate          SOCIAL HISTORY       Social History     Socioeconomic History    Marital status: Single     Spouse name: None    Number of children: None    Years of education: None    Highest education level: None   Occupational History    None   Social Needs    Financial resource strain: None    Food insecurity     Worry: None     Inability: None    Transportation needs     Medical: None     Non-medical: None   Tobacco Use    Smoking status: knees/ankles  CMS intact bilateral lower extremities   Skin:     General: Skin is warm and dry. Neurological:      Mental Status: He is alert and oriented to person, place, and time. DIAGNOSTIC RESULTS     EKG: All EKG's are interpreted by the Emergency Department Physician who either signs or Co-signs this chart in the absence of acardiologist.        RADIOLOGY:   Non-plain film images such as CT, Ultrasound andMRI are read by the radiologist. Plain radiographic images are visualized and preliminarily interpreted by the emergency physician with the below findings:        Interpretation per the Radiologist below, if available at the time of this note:    XR LUMBAR SPINE (2-3 VIEWS)   Final Result   Impression:    1. No acute osseous injury. Posterior and lateral spinal fusion   constructs. Lateral construct alina fracture is a pre-existing finding,   dating back to 2014. Constructs are otherwise unremarkable. Signed by Dr Vinita Gottron on 3/14/2020 2:47 PM      XR HIP BILATERAL W AP PELVIS (2 VIEWS)   Final Result   Impression:    1. No fracture or subluxation of the hips. Moderate right hip   osteoarthritis. Signed by Dr Vinita Gottron on 3/14/2020 2:49 PM            ED BEDSIDE ULTRASOUND:   Performed by ED Physician - none    LABS:  Labs Reviewed - No data to display    All other labs were within normal range or not returned as of this dictation. RE-ASSESSMENT     Pt is up ambulatory at discharge. EMERGENCY DEPARTMENT COURSE and DIFFERENTIALDIAGNOSIS/MDM:   Vitals:    Vitals:    03/14/20 1406 03/14/20 1500   BP: (!) 159/95 135/83   Pulse: 114 101   Resp: 20 20   Temp: 98.6 °F (37 °C)    TempSrc: Oral    SpO2: 97% 95%       MDM      CONSULTS:  None    PROCEDURES:  Unless otherwise notedbelow, none     Procedures    FINAL IMPRESSION     1. Bilateral low back pain, unspecified chronicity, unspecified whether sciatica present    2. Pain of both hip joints    3.  Fall, initial encounter (Pleasenote that portions of this note were completed with a voice recognition program.  Efforts were made to edit the dictations but occasionally words are mis-transcribed.)              Reinaldo Beck, APRN  03/14/20 4815

## 2020-03-14 NOTE — ED NOTES
Pt ambulatory independently with steady gait for approximately 200 ft. No signs of distress.   Returned to bed and notified Delfina Edis MOORE     May Fay, RN  03/14/20 2459

## 2020-03-16 RX ORDER — PRAZOSIN HYDROCHLORIDE 2 MG/1
2 CAPSULE ORAL NIGHTLY
Qty: 30 CAPSULE | Refills: 3 | Status: SHIPPED | OUTPATIENT
Start: 2020-03-16 | End: 2020-07-12

## 2020-03-18 NOTE — ED PROVIDER NOTES
Subjective     History provided by:  Patient   used: No    Back Pain   Location:  Lumbar spine  Quality:  Aching, burning, shooting and stiffness  Stiffness is present:  All day  Radiates to:  L posterior upper leg  Pain is:  Same all the time  Onset quality:  Sudden  Duration:  1 day  Timing:  Constant  Progression:  Unchanged  Chronicity:  Recurrent  Context: twisting    Context: not emotional stress, not falling, not jumping from heights, not lifting heavy objects, not MCA, not MVA, not occupational injury, not pedestrian accident, not physical stress, not recent illness and not recent injury    Context comment:  Occurred after twisting motion 1 day ago  Relieved by:  Nothing  Worsened by:  Nothing  Ineffective treatments:  None tried  Associated symptoms: leg pain    Associated symptoms: no abdominal pain, no abdominal swelling, no bladder incontinence, no bowel incontinence, no chest pain, no dysuria, no fever, no headaches, no numbness, no paresthesias, no pelvic pain, no perianal numbness, no tingling, no weakness and no weight loss    Risk factors: no hx of cancer, no hx of osteoporosis, no lack of exercise, no menopause, not obese, not pregnant, no recent surgery, no steroid use and no vascular disease        Review of Systems   Constitutional: Negative for fever and weight loss.   Cardiovascular: Negative for chest pain.   Gastrointestinal: Negative for abdominal pain and bowel incontinence.   Genitourinary: Negative for bladder incontinence, dysuria and pelvic pain.   Musculoskeletal: Positive for back pain.   Neurological: Negative for tingling, weakness, numbness, headaches and paresthesias.   All other systems reviewed and are negative.      Past Medical History:   Diagnosis Date   • Back pain    • Hypertension    • Neuropathy        Allergies   Allergen Reactions   • Darvon [Propoxyphene]    • Dilaudid [Hydromorphone Hcl]    • Flexeril [Cyclobenzaprine]    • Naproxen    • Nubain  [Nalbuphine Hcl]    • Toradol [Ketorolac Tromethamine]    • Zanaflex [Tizanidine Hcl] Other (See Comments)     Restless Leg        Past Surgical History:   Procedure Laterality Date   • BACK SURGERY         History reviewed. No pertinent family history.    Social History     Socioeconomic History   • Marital status: Significant Other     Spouse name: Not on file   • Number of children: Not on file   • Years of education: Not on file   • Highest education level: Not on file   Tobacco Use   • Smoking status: Current Every Day Smoker     Packs/day: 1.00   Substance and Sexual Activity   • Alcohol use: No   • Drug use: No           Objective   Physical Exam   Constitutional: He is oriented to person, place, and time. He appears well-developed and well-nourished.   HENT:   Head: Normocephalic and atraumatic.   Eyes: Conjunctivae are normal.   Cardiovascular: Normal rate, regular rhythm and normal heart sounds.   Pulmonary/Chest: Effort normal and breath sounds normal.   Musculoskeletal:        Back:    Neurological: He is alert and oriented to person, place, and time.   Skin: Skin is warm and dry. Capillary refill takes less than 2 seconds.   Psychiatric: He has a normal mood and affect.   Nursing note and vitals reviewed.      Procedures           ED Course  ED Course as of Mar 18 0914   Wed Mar 18, 2020   0913 X-ray shows no acute findings.  The patient will be discharged home at this time in stable condition.  Patient is advised to follow-up with primary care provider 1 to 2 days for recheck.  Advised return to the ER for any new or worsening symptoms.  Given a prescription for Medrol Dosepak.  Patient has no evidence of cauda equina.  Patient symptoms are similar to those that he has with his chronic back pain.    [LF]      ED Course User Index  [LF] Haven Shen, APRN                                   XR Spine Lumbar Complete 4+VW   ED Interpretation   No acute osseous abnormality       Final Result    Impression:       No acute osseous findings. No evidence of hardware complication.   This report was finalized on 03/10/2020 07:07 by Dr. Josh Warren MD.        Labs Reviewed - No data to display          MDM  Number of Diagnoses or Management Options  Back strain, initial encounter: new and requires workup     Amount and/or Complexity of Data Reviewed  Tests in the radiology section of CPT®: ordered and reviewed  Discuss the patient with other providers: yes  Independent visualization of images, tracings, or specimens: yes    Patient Progress  Patient progress: stable      Final diagnoses:   Back strain, initial encounter            Haven Shen, APRN  03/18/20 0914

## 2020-03-19 ENCOUNTER — HOSPITAL ENCOUNTER (EMERGENCY)
Age: 40
Discharge: HOME | End: 2020-03-19
Attending: PEDIATRICS
Payer: MEDICARE

## 2020-03-19 VITALS
HEART RATE: 88 BPM | RESPIRATION RATE: 16 BRPM | WEIGHT: 210 LBS | BODY MASS INDEX: 33.89 KG/M2 | DIASTOLIC BLOOD PRESSURE: 95 MMHG | SYSTOLIC BLOOD PRESSURE: 149 MMHG | OXYGEN SATURATION: 97 % | TEMPERATURE: 98.2 F

## 2020-03-19 PROCEDURE — 6360000002 HC RX W HCPCS: Performed by: PEDIATRICS

## 2020-03-19 PROCEDURE — 96375 TX/PRO/DX INJ NEW DRUG ADDON: CPT

## 2020-03-19 PROCEDURE — 2580000003 HC RX 258: Performed by: PEDIATRICS

## 2020-03-19 PROCEDURE — 96374 THER/PROPH/DIAG INJ IV PUSH: CPT

## 2020-03-19 PROCEDURE — 99283 EMERGENCY DEPT VISIT LOW MDM: CPT

## 2020-03-19 PROCEDURE — 6370000000 HC RX 637 (ALT 250 FOR IP): Performed by: PEDIATRICS

## 2020-03-19 RX ORDER — DIPHENHYDRAMINE HYDROCHLORIDE 50 MG/ML
25 INJECTION INTRAMUSCULAR; INTRAVENOUS ONCE
Status: COMPLETED | OUTPATIENT
Start: 2020-03-19 | End: 2020-03-19

## 2020-03-19 RX ORDER — 0.9 % SODIUM CHLORIDE 0.9 %
1000 INTRAVENOUS SOLUTION INTRAVENOUS ONCE
Status: COMPLETED | OUTPATIENT
Start: 2020-03-19 | End: 2020-03-19

## 2020-03-19 RX ORDER — METOCLOPRAMIDE HYDROCHLORIDE 5 MG/ML
10 INJECTION INTRAMUSCULAR; INTRAVENOUS ONCE
Status: COMPLETED | OUTPATIENT
Start: 2020-03-19 | End: 2020-03-19

## 2020-03-19 RX ORDER — LISINOPRIL 10 MG/1
10 TABLET ORAL ONCE
Status: COMPLETED | OUTPATIENT
Start: 2020-03-19 | End: 2020-03-19

## 2020-03-19 RX ADMIN — LISINOPRIL 10 MG: 10 TABLET ORAL at 22:33

## 2020-03-19 RX ADMIN — DIPHENHYDRAMINE HYDROCHLORIDE 25 MG: 50 INJECTION, SOLUTION INTRAMUSCULAR; INTRAVENOUS at 22:33

## 2020-03-19 RX ADMIN — METOCLOPRAMIDE 10 MG: 5 INJECTION, SOLUTION INTRAMUSCULAR; INTRAVENOUS at 22:33

## 2020-03-19 RX ADMIN — SODIUM CHLORIDE 1000 ML: 9 INJECTION, SOLUTION INTRAVENOUS at 22:33

## 2020-03-19 ASSESSMENT — ENCOUNTER SYMPTOMS
NAUSEA: 0
SHORTNESS OF BREATH: 0
EYE DISCHARGE: 0
COUGH: 0
BACK PAIN: 0
VOMITING: 0
RHINORRHEA: 0
ABDOMINAL PAIN: 0
COLOR CHANGE: 0

## 2020-03-19 ASSESSMENT — PAIN DESCRIPTION - PAIN TYPE: TYPE: ACUTE PAIN

## 2020-03-19 ASSESSMENT — PAIN DESCRIPTION - ORIENTATION: ORIENTATION: ANTERIOR

## 2020-03-19 ASSESSMENT — PAIN SCALES - GENERAL: PAINLEVEL_OUTOF10: 10

## 2020-03-19 ASSESSMENT — PAIN DESCRIPTION - DESCRIPTORS: DESCRIPTORS: ACHING

## 2020-03-19 ASSESSMENT — PAIN DESCRIPTION - LOCATION: LOCATION: HEAD

## 2020-03-20 NOTE — ED TRIAGE NOTES
Pt here with migraine headache. States similar to prior HA onset 3 hours ago.  Taken Excedrin without relief 1.5hours ago

## 2020-03-20 NOTE — ED PROVIDER NOTES
[facility]  eMERGENCY dEPARTMENT eNCOUnter      Pt Name: Azeem Head  MRN: [id number]  Birthdate 1980  Date of evaluation: 3/19/2020  Provider: Andry Rivera MD    CHIEF COMPLAINT       Chief Complaint   Patient presents with    Headache         HISTORY OF PRESENT ILLNESS   (Location/Symptom, Timing/Onset,Context/Setting, Quality, Duration, Modifying Factors, Severity)  Note limiting factors. Azeem Head is a 36 y.o. male who presents to the emergency department with \"migraine headache\" for 3 hours. Patient has had a history of migraine headaches in the past.  Patient states this is similar to prior episodes. Patient complains of pain \"behind my right eyeball. \"  Patient is having associated symptoms of photophobia. Patient denies visual changes, weakness, difficulty walking or speaking, nausea or vomiting. Patient states he took Excedrin Migraine approximately 2 and half hours ago without relief. Patient states that he also has a history of hypertension and forgot to take his lisinopril 10 mg p.o. at home today. HPI    NursingNotes were reviewed. REVIEW OF SYSTEMS    (2-9 systems for level 4, 10 or more for level 5)     Review of Systems   Constitutional: Negative for chills and fever. HENT: Negative for congestion and rhinorrhea. Eyes: Negative for discharge. Respiratory: Negative for cough and shortness of breath. Cardiovascular: Positive for leg swelling (Chronic per patient \"when I am on my feet a lot\"). Negative for chest pain and palpitations. Gastrointestinal: Negative for abdominal pain, nausea and vomiting. Genitourinary: Negative for difficulty urinating and dysuria. Musculoskeletal: Negative for back pain and neck pain. Skin: Negative for color change and pallor. Neurological: Positive for headaches. Negative for syncope, speech difficulty and weakness. Psychiatric/Behavioral: Negative for agitation and confusion.    All other systems reviewed and Needs    Financial resource strain: None    Food insecurity     Worry: None     Inability: None    Transportation needs     Medical: None     Non-medical: None   Tobacco Use    Smoking status: Current Every Day Smoker     Packs/day: 0.50    Smokeless tobacco: Never Used   Substance and Sexual Activity    Alcohol use: No    Drug use: No    Sexual activity: None   Lifestyle    Physical activity     Days per week: None     Minutes per session: None    Stress: None   Relationships    Social connections     Talks on phone: None     Gets together: None     Attends Church service: None     Active member of club or organization: None     Attends meetings of clubs or organizations: None     Relationship status: None    Intimate partner violence     Fear of current or ex partner: None     Emotionally abused: None     Physically abused: None     Forced sexual activity: None   Other Topics Concern    None   Social History Narrative    None       SCREENINGS    [place] Coma Scale  Eye Opening: Spontaneous  Best Verbal Response: Oriented  Best Motor Response: Obeys commands  Lazaro Coma Scale Score: 15        PHYSICAL EXAM    (up to 7 for level 4, 8 or more for level 5)     ED Triage Vitals [03/19/20 2210]   BP Temp Temp Source Pulse Resp SpO2 Height Weight   (!) 149/95 98.2 °F (36.8 °C) Oral 88 16 97 % -- 210 lb (95.3 kg)       Physical Exam  Vitals signs and nursing note reviewed. Constitutional:       General: He is not in acute distress. Appearance: Normal appearance. Comments: Sits quietly in the dark. HENT:      Head: Normocephalic and atraumatic. Right Ear: External ear normal.      Left Ear: External ear normal.      Nose: Nose normal.      Mouth/Throat:      Mouth: Mucous membranes are moist.      Pharynx: Oropharynx is clear. No oropharyngeal exudate. Eyes:      General: No scleral icterus.      Conjunctiva/sclera: Conjunctivae normal.      Pupils: Pupils are equal, round, and reactive to light. Comments: Pupils are 3 mm bilaterally giving limited funduscopic exam without evidence of large hemorrhage. Pulls are equal round and reactive. Neck:      Musculoskeletal: Neck supple. No neck rigidity. Cardiovascular:      Rate and Rhythm: Normal rate and regular rhythm. Pulses: Normal pulses. Heart sounds: Normal heart sounds. Pulmonary:      Effort: Pulmonary effort is normal.      Breath sounds: Normal breath sounds. Abdominal:      General: Bowel sounds are normal.      Palpations: Abdomen is soft. Tenderness: There is no abdominal tenderness. There is no guarding. Musculoskeletal:         General: No tenderness or deformity. Right lower leg: Edema present. Left lower leg: Edema present. Skin:     General: Skin is warm and dry. Capillary Refill: Capillary refill takes less than 2 seconds. Coloration: Skin is not jaundiced. Neurological:      General: No focal deficit present. Mental Status: He is alert and oriented to person, place, and time. Mental status is at baseline. Cranial Nerves: No cranial nerve deficit. Sensory: No sensory deficit. Motor: No weakness. Coordination: Coordination normal.      Gait: Gait normal.   Psychiatric:         Mood and Affect: Mood normal.         Behavior: Behavior normal.         DIAGNOSTIC RESULTS       No orders to display           LABS:  Labs Reviewed - No data to display    All other labs were within normal range or not returned as of this dictation.     EMERGENCY DEPARTMENT COURSE and DIFFERENTIAL DIAGNOSIS/MDM:   Vitals:    Vitals:    03/19/20 2210   BP: (!) 149/95   Pulse: 88   Resp: 16   Temp: 98.2 °F (36.8 °C)   TempSrc: Oral   SpO2: 97%   Weight: 210 lb (95.3 kg)       MDM  42-year-old male with history of migraine headaches presents with right-sided migraine that began 3 hours prior to arrival.  IV fluids, IV Reglan and IV Benadryl were given in the emergency department

## 2020-03-21 ENCOUNTER — HOSPITAL ENCOUNTER (EMERGENCY)
Facility: HOSPITAL | Age: 40
Discharge: HOME | End: 2020-03-21
Admitting: PHYSICIAN ASSISTANT

## 2020-03-21 VITALS
HEART RATE: 105 BPM | WEIGHT: 220 LBS | TEMPERATURE: 98.4 F | DIASTOLIC BLOOD PRESSURE: 81 MMHG | RESPIRATION RATE: 20 BRPM | BODY MASS INDEX: 35.36 KG/M2 | SYSTOLIC BLOOD PRESSURE: 141 MMHG | HEIGHT: 66 IN | OXYGEN SATURATION: 97 %

## 2020-03-21 DIAGNOSIS — M79.18 MYOFASCIAL PAIN SYNDROME OF THORACIC SPINE: Primary | ICD-10-CM

## 2020-03-21 PROCEDURE — 63710000001 ONDANSETRON ODT 4 MG TABLET DISPERSIBLE: Performed by: EMERGENCY MEDICINE

## 2020-03-21 PROCEDURE — 99283 EMERGENCY DEPT VISIT LOW MDM: CPT

## 2020-03-21 PROCEDURE — 25010000002 METHYLPREDNISOLONE PER 125 MG: Performed by: EMERGENCY MEDICINE

## 2020-03-21 PROCEDURE — 96372 THER/PROPH/DIAG INJ SC/IM: CPT

## 2020-03-21 PROCEDURE — 25010000002 MORPHINE PER 10 MG: Performed by: EMERGENCY MEDICINE

## 2020-03-21 RX ORDER — MORPHINE SULFATE 10 MG/ML
6 INJECTION INTRAMUSCULAR; INTRAVENOUS; SUBCUTANEOUS ONCE
Status: COMPLETED | OUTPATIENT
Start: 2020-03-21 | End: 2020-03-21

## 2020-03-21 RX ORDER — ONDANSETRON 4 MG/1
4 TABLET, ORALLY DISINTEGRATING ORAL ONCE
Status: COMPLETED | OUTPATIENT
Start: 2020-03-21 | End: 2020-03-21

## 2020-03-21 RX ORDER — METHYLPREDNISOLONE SODIUM SUCCINATE 125 MG/2ML
125 INJECTION, POWDER, LYOPHILIZED, FOR SOLUTION INTRAMUSCULAR; INTRAVENOUS ONCE
Status: COMPLETED | OUTPATIENT
Start: 2020-03-21 | End: 2020-03-21

## 2020-03-21 RX ORDER — METHYLPREDNISOLONE 4 MG/1
TABLET ORAL
Qty: 21 EACH | Refills: 0 | Status: SHIPPED | OUTPATIENT
Start: 2020-03-21 | End: 2020-07-10

## 2020-03-21 RX ADMIN — ONDANSETRON 4 MG: 4 TABLET, ORALLY DISINTEGRATING ORAL at 16:00

## 2020-03-21 RX ADMIN — MORPHINE SULFATE 6 MG: 10 INJECTION, SOLUTION INTRAMUSCULAR; INTRAVENOUS at 16:00

## 2020-03-21 RX ADMIN — METHYLPREDNISOLONE SODIUM SUCCINATE 125 MG: 125 INJECTION, POWDER, FOR SOLUTION INTRAMUSCULAR; INTRAVENOUS at 16:00

## 2020-03-23 ENCOUNTER — HOSPITAL ENCOUNTER (EMERGENCY)
Age: 40
Discharge: HOME | End: 2020-03-23
Payer: MEDICARE

## 2020-03-23 ENCOUNTER — APPOINTMENT (OUTPATIENT)
Dept: GENERAL RADIOLOGY | Age: 40
End: 2020-03-23
Payer: MEDICARE

## 2020-03-23 VITALS
DIASTOLIC BLOOD PRESSURE: 85 MMHG | OXYGEN SATURATION: 95 % | TEMPERATURE: 98.2 F | SYSTOLIC BLOOD PRESSURE: 150 MMHG | HEART RATE: 99 BPM | RESPIRATION RATE: 18 BRPM | BODY MASS INDEX: 33.75 KG/M2 | WEIGHT: 210 LBS | HEIGHT: 66 IN

## 2020-03-23 PROCEDURE — 96372 THER/PROPH/DIAG INJ SC/IM: CPT

## 2020-03-23 PROCEDURE — 99283 EMERGENCY DEPT VISIT LOW MDM: CPT

## 2020-03-23 PROCEDURE — 6360000002 HC RX W HCPCS: Performed by: NURSE PRACTITIONER

## 2020-03-23 PROCEDURE — 72072 X-RAY EXAM THORAC SPINE 3VWS: CPT

## 2020-03-23 RX ORDER — IBUPROFEN 800 MG/1
800 TABLET ORAL EVERY 8 HOURS PRN
Qty: 30 TABLET | Refills: 0 | Status: SHIPPED | OUTPATIENT
Start: 2020-03-23 | End: 2020-08-09 | Stop reason: ALTCHOICE

## 2020-03-23 RX ORDER — ORPHENADRINE CITRATE 30 MG/ML
60 INJECTION INTRAMUSCULAR; INTRAVENOUS ONCE
Status: COMPLETED | OUTPATIENT
Start: 2020-03-23 | End: 2020-03-23

## 2020-03-23 RX ORDER — DEXAMETHASONE SODIUM PHOSPHATE 10 MG/ML
4 INJECTION, SOLUTION INTRAMUSCULAR; INTRAVENOUS ONCE
Status: COMPLETED | OUTPATIENT
Start: 2020-03-23 | End: 2020-03-23

## 2020-03-23 RX ADMIN — DEXAMETHASONE SODIUM PHOSPHATE 4 MG: 10 INJECTION, SOLUTION INTRAMUSCULAR; INTRAVENOUS at 22:42

## 2020-03-23 RX ADMIN — ORPHENADRINE CITRATE 60 MG: 30 INJECTION INTRAMUSCULAR; INTRAVENOUS at 22:42

## 2020-03-23 ASSESSMENT — ENCOUNTER SYMPTOMS
ABDOMINAL PAIN: 0
WHEEZING: 0
BLOOD IN STOOL: 0
SORE THROAT: 0
PHOTOPHOBIA: 0
NAUSEA: 0
SHORTNESS OF BREATH: 0
EYE REDNESS: 0
RECTAL PAIN: 0
COLOR CHANGE: 0
VOMITING: 0
CONSTIPATION: 0
STRIDOR: 0
CHEST TIGHTNESS: 0
DIARRHEA: 0
SINUS PAIN: 0
APNEA: 0
EYE DISCHARGE: 0
EYE PAIN: 0
ABDOMINAL DISTENTION: 0
BACK PAIN: 1

## 2020-03-24 NOTE — ED PROVIDER NOTES
[facility] EMERGENCY DEPT  eMERGENCYdEPARTMENT eNCOUnter      Pt Name: Erik Barahona  MRN: [id number]  Birthdate 1980  Date of evaluation: 3/23/2020  [facility] [place] - [facility]    CHIEF COMPLAINT       Chief Complaint   Patient presents with    Back Pain     slipped in tub         HISTORY OF PRESENT ILLNESS  (Location/Symptom, Timing/Onset, Context/Setting, Quality, Duration, Modifying Factors, Severity.)   Erik Barahona is a 36 y.o. male who presents to the emergency department evaluation of mid back pain. Has a history of chronic back pain in this area and said earlier he was taking a shower and started to slip and grabbed a hold of the handrail in the tub and twisted his back. He denies falling and hitting his head or neck. Says he only twisted. He says the pain feels like his typical pain just worse. Says he took ibuprofen without any relief of symptoms. Denies any bowel or bladder incontinence, denies saddle anesthesia. Says he is in the process of being referred to pain management. Was just seen at [facility] emergency department 2 days ago with the same complaint. The history is provided by the patient. Back Pain   Location:  Thoracic spine  Quality:  Aching  Radiates to:  Does not radiate  Pain severity:  Severe  Duration:  3 hours  Timing:  Constant  Progression:  Worsening  Chronicity:  Chronic  Context: twisting    Relieved by:  Nothing  Worsened by: Movement  Ineffective treatments:  NSAIDs  Associated symptoms: no abdominal pain, no chest pain, no dysuria, no fever, no headaches and no numbness        Nursing Notes were reviewed and I agree. REVIEW OF SYSTEMS    (2-9 systems for level 4, 10 or more for level 5)     Review of Systems   Constitutional: Negative for activity change, appetite change, chills, diaphoresis, fatigue and fever. HENT: Negative for congestion, ear pain, nosebleeds, sinus pain and sore throat. Eyes: Negative for photophobia, pain, discharge and redness. Respiratory: Negative for apnea, chest tightness, shortness of breath, wheezing and stridor. Cardiovascular: Negative for chest pain, palpitations and leg swelling. Gastrointestinal: Negative for abdominal distention, abdominal pain, blood in stool, constipation, diarrhea, nausea, rectal pain and vomiting. Endocrine: Negative for cold intolerance, heat intolerance, polydipsia, polyphagia and polyuria. Genitourinary: Negative for decreased urine volume, difficulty urinating, dysuria, flank pain and urgency. Musculoskeletal: Positive for back pain. Negative for arthralgias, joint swelling, neck pain and neck stiffness. Skin: Negative for color change, pallor, rash and wound. Allergic/Immunologic: Negative for immunocompromised state. Neurological: Negative for dizziness, syncope, numbness and headaches. Hematological: Negative for adenopathy. Does not bruise/bleed easily. Psychiatric/Behavioral: Negative for agitation, behavioral problems and confusion. Except as noted above the remainder of the review of systems was reviewed and negative.        PAST MEDICAL HISTORY     Past Medical History:   Diagnosis Date    Back pain     Chest pain     Scoliosis     Tobacco abuse     Vitamin D deficiency          SURGICAL HISTORY       Past Surgical History:   Procedure Laterality Date    BACK SURGERY      x 3          CURRENT MEDICATIONS       Previous Medications    ARIPIPRAZOLE (ABILIFY) 10 MG TABLET    TAKE 1 TABLET BY MOUTH DAILY    CETIRIZINE (ZYRTEC) 10 MG TABLET    Take 1 tablet by mouth daily    CHOLECALCIFEROL (VITAMIN D3) 5000 UNITS CAPS    1 tab/cap daily    CYANOCOBALAMIN 500 MCG TABLET    Take 1 tablet by mouth daily    ERGOCALCIFEROL (ERGOCALCIFEROL) 88478 UNITS CAPSULE    Take 1 capsule by mouth once a week    GABAPENTIN (NEURONTIN) 600 MG TABLET    1 cap am and afternoon and 2 caps at bedtime    LAMOTRIGINE (LAMICTAL) 100 MG TABLET    TAKE 1 TABLET BY MOUTH DAILY    LISINOPRIL (PRINIVIL;ZESTRIL) 40 MG TABLET    Take 1 tablet by mouth daily    LISINOPRIL-HYDROCHLOROTHIAZIDE (PRINZIDE;ZESTORETIC) 20-12.5 MG PER TABLET    Take 2 tablets by mouth daily    PRAZOSIN (MINIPRESS) 2 MG CAPSULE    TAKE 1 CAPSULE BY MOUTH NIGHTLY    VARENICLINE (CHANTIX CONTINUING MONTH CHUNG) 1 MG TABLET    Take 1 tablet by mouth 2 times daily       ALLERGIES     Flexeril [cyclobenzaprine]; Toradol [ketorolac tromethamine];  Demerol hcl [meperidine]; and Other    FAMILY HISTORY       Family History   Problem Relation Age of Onset    Heart Disease Mother     Hypertension Mother     Cancer Father         prostate          SOCIAL HISTORY       Social History     Socioeconomic History    Marital status: Single     Spouse name: None    Number of children: None    Years of education: None    Highest education level: None   Occupational History    None   Social Needs    Financial resource strain: None    Food insecurity     Worry: None     Inability: None    Transportation needs     Medical: None     Non-medical: None   Tobacco Use    Smoking status: Current Every Day Smoker     Packs/day: 0.50    Smokeless tobacco: Never Used   Substance and Sexual Activity    Alcohol use: No    Drug use: No    Sexual activity: None   Lifestyle    Physical activity     Days per week: None     Minutes per session: None    Stress: None   Relationships    Social connections     Talks on phone: None     Gets together: None     Attends Evangelical service: None     Active member of club or organization: None     Attends meetings of clubs or organizations: None     Relationship status: None    Intimate partner violence     Fear of current or ex partner: None     Emotionally abused: None     Physically abused: None     Forced sexual activity: None   Other Topics Concern    None   Social History Narrative    None       SCREENINGS    Lazaro Coma Scale  Eye Opening: Spontaneous  Best Verbal Response: Oriented  Best Motor Response: Obeys commands  Lazaro Coma Scale Score: 15      PHYSICAL EXAM    (up to 7 forlevel 4, 8 or more for level 5)     ED Triage Vitals [03/23/20 2229]   BP Temp Temp Source Pulse Resp SpO2 Height Weight   (!) 150/85 98.2 °F (36.8 °C) Oral 102 19 96 % 5' 6\" (1.676 m) 210 lb (95.3 kg)       Physical Exam  Vitals signs and nursing note reviewed. Constitutional:       General: He is not in acute distress. Appearance: Normal appearance. He is well-developed. He is not ill-appearing, toxic-appearing or diaphoretic. HENT:      Head: Normocephalic and atraumatic. Nose: Nose normal.      Mouth/Throat:      Mouth: Mucous membranes are moist.   Eyes:      General: No scleral icterus. Right eye: No discharge. Left eye: No discharge. Extraocular Movements: Extraocular movements intact. Conjunctiva/sclera: Conjunctivae normal.      Pupils: Pupils are equal, round, and reactive to light. Neck:      Musculoskeletal: Normal range of motion and neck supple. Vascular: No JVD. Trachea: No tracheal deviation. Cardiovascular:      Rate and Rhythm: Normal rate and regular rhythm. Pulses: Normal pulses. Heart sounds: Normal heart sounds. No murmur. No friction rub. No gallop. Pulmonary:      Effort: Pulmonary effort is normal. No respiratory distress. Breath sounds: Normal breath sounds. No stridor. No wheezing, rhonchi or rales. Chest:      Chest wall: No tenderness. Abdominal:      General: Bowel sounds are normal. There is no distension. Palpations: Abdomen is soft. There is no mass. Tenderness: There is no abdominal tenderness. There is no guarding or rebound. Hernia: No hernia is present. Musculoskeletal: Normal range of motion. General: No deformity. Cervical back: Normal.      Thoracic back: He exhibits tenderness and pain.  He exhibits normal range of motion, no bony tenderness, no swelling, no edema, no deformity and no Patient says he is in the process of being referred to pain management patient has multiple allergies but says he has been taking ibuprofen without any difficulty so instructed him to take ibuprofen every 8 hours with food. He has several allergies to muscle relaxants so I did not write any at this time. He is to return for worsening. He was stable and ambulatory. After careful review of history, physical, and supporting data patient has a low likelihood for life-threatening etiology of his back pain. There is no history of constitutional symptoms, IV drug abuse, cancer, immunocompromised status, or serious trauma. The patient is afebrile, has an unremarkable neuro exam, no bladder or bowel dysfunction, or evidence of saddle anesthesia. The patient was reexamined prior to discharge and continues to have a nonfocal exam. Questions were invited and answered. Strict return precautions were reviewed with the patient and/or guardian, and they demonstrate understanding and agreement with the proposed plan. Patient is well-appearing, stable for discharge and follow-up without fail with his/her medical doctor. I had a detailed discussion with the patient and/or guardian regarding the historical points, exam findings, and any diagnostic results supporting the discharge diagnosis. The patient was educated on care and need for follow-up. Strict return instructions including red flag signs and symptoms were discussed with the patient. Medications for discharge discussed, and adverse effects reviewed. Questions invited and answered. Patient shows understanding of the discharge information and agrees to follow-up. PROCEDURES:    Procedures      FINAL IMPRESSION      1.  Thoracic myofascial strain, initial encounter          DISPOSITION/PLAN   DISPOSITION Decision To Discharge 03/23/2020 10:48:45 PM      PATIENT REFERRED TO:  Dipti Ross MD  [address] [place] [address]   [address]

## 2020-04-23 NOTE — TELEPHONE ENCOUNTER
Sailaja Faustin, I was unsure if this was ok to phone-in, thank you! Cande Beverly called to request a refill on his medication. Last office visit : 2019   Next office visit : 2020     Last Solomon Barraza: 20  Medication Contract:  Needed  Last UDS: 19  Last Rx: 11/6/19 x 5    Amphetamine Screen, Urine   Date Value Ref Range Status   2019 neg  Final     Barbiturate Screen, Urine   Date Value Ref Range Status   2019 neg  Final     Benzodiazepine Screen, Urine   Date Value Ref Range Status   2019 neg  Final     Buprenorphine Urine   Date Value Ref Range Status   2019 neg  Final     Cocaine Metabolite Screen, Urine   Date Value Ref Range Status   2019 neg  Final     Gabapentin Screen, Urine   Date Value Ref Range Status   2019 neg  Final     MDMA, Urine   Date Value Ref Range Status   2019 neg  Final     Methamphetamine, Urine   Date Value Ref Range Status   2019 neg  Final     Opiate Scrn, Ur   Date Value Ref Range Status   2019 neg  Final     Oxycodone Screen, Ur   Date Value Ref Range Status   2019 neg  Final     PCP Screen, Urine   Date Value Ref Range Status   2019 neg  Final     Propoxyphene Screen, Urine   Date Value Ref Range Status   2019 neg  Final     THC Screen, Urine   Date Value Ref Range Status   2019 neg  Final     Tricyclic Antidepressants, Urine   Date Value Ref Range Status   2019 neg  Final           Requested Prescriptions     Pending Prescriptions Disp Refills    gabapentin (NEURONTIN) 600 MG tablet 120 tablet 5     Si cap am and afternoon and 2 caps at bedtime         Please approve or refuse this medication.    Brenda Goldstein

## 2020-04-24 RX ORDER — GABAPENTIN 600 MG/1
TABLET ORAL
Qty: 120 TABLET | Refills: 5 | OUTPATIENT
Start: 2020-04-24 | End: 2020-05-23

## 2020-04-28 ENCOUNTER — TELEPHONE (OUTPATIENT)
Dept: PRIMARY CARE CLINIC | Age: 40
End: 2020-04-28

## 2020-05-17 ENCOUNTER — APPOINTMENT (OUTPATIENT)
Dept: GENERAL RADIOLOGY | Facility: HOSPITAL | Age: 40
End: 2020-05-17

## 2020-05-17 ENCOUNTER — HOSPITAL ENCOUNTER (EMERGENCY)
Facility: HOSPITAL | Age: 40
Discharge: HOME | End: 2020-05-17
Admitting: EMERGENCY MEDICINE

## 2020-05-17 VITALS
RESPIRATION RATE: 18 BRPM | SYSTOLIC BLOOD PRESSURE: 148 MMHG | BODY MASS INDEX: 35.03 KG/M2 | DIASTOLIC BLOOD PRESSURE: 89 MMHG | WEIGHT: 218 LBS | TEMPERATURE: 98.4 F | HEIGHT: 66 IN | OXYGEN SATURATION: 99 % | HEART RATE: 89 BPM

## 2020-05-17 DIAGNOSIS — M54.50 CHRONIC BILATERAL LOW BACK PAIN WITHOUT SCIATICA: Primary | ICD-10-CM

## 2020-05-17 DIAGNOSIS — G89.29 CHRONIC BILATERAL LOW BACK PAIN WITHOUT SCIATICA: Primary | ICD-10-CM

## 2020-05-17 PROCEDURE — 99283 EMERGENCY DEPT VISIT LOW MDM: CPT

## 2020-05-17 PROCEDURE — 25010000002 ONDANSETRON PER 1 MG: Performed by: NURSE PRACTITIONER

## 2020-05-17 PROCEDURE — 96372 THER/PROPH/DIAG INJ SC/IM: CPT

## 2020-05-17 PROCEDURE — 25010000002 MORPHINE PER 10 MG: Performed by: NURSE PRACTITIONER

## 2020-05-17 PROCEDURE — 72110 X-RAY EXAM L-2 SPINE 4/>VWS: CPT

## 2020-05-17 RX ORDER — ONDANSETRON 2 MG/ML
4 INJECTION INTRAMUSCULAR; INTRAVENOUS ONCE
Status: COMPLETED | OUTPATIENT
Start: 2020-05-17 | End: 2020-05-17

## 2020-05-17 RX ADMIN — MORPHINE SULFATE 4 MG: 4 INJECTION, SOLUTION INTRAMUSCULAR; INTRAVENOUS at 14:52

## 2020-05-17 RX ADMIN — ONDANSETRON HYDROCHLORIDE 4 MG: 2 SOLUTION INTRAMUSCULAR; INTRAVENOUS at 14:52

## 2020-05-17 NOTE — ED PROVIDER NOTES
Subjective     Back Pain   Location:  Lumbar spine  Onset quality:  Gradual  Chronicity:  Recurrent  Context: not falling, not jumping from heights and not lifting heavy objects    Context comment:  Reports chronic back pain with hx of 2 previous back surgeries; believes he may have slept wrong causing an exacerbation to his back; denies loss of bowel or bladder, no saddle anesthesia  Worsened by:  Movement  Ineffective treatments:  NSAIDs  Associated symptoms: no abdominal pain, no abdominal swelling, no bladder incontinence, no bowel incontinence, no dysuria, no leg pain, no numbness, no paresthesias, no pelvic pain, no perianal numbness and no tingling    Risk factors comment:  Positive for chronic back pain with hx of back surgery; has been seen in this er previously for similar sxs; see other ER notes      Review of Systems   Constitutional: Negative.    HENT: Negative.    Respiratory: Negative.    Cardiovascular: Negative.    Gastrointestinal: Negative.  Negative for abdominal pain, bowel incontinence, nausea and vomiting.   Genitourinary: Negative.  Negative for bladder incontinence, dysuria and pelvic pain.   Musculoskeletal: Positive for back pain.   Skin: Negative.    Neurological: Negative for tingling, numbness and paresthesias.   All other systems reviewed and are negative.      Past Medical History:   Diagnosis Date   • Back pain    • Hypertension    • Neuropathy        Allergies   Allergen Reactions   • Benadryl [Diphenhydramine] Other (See Comments)     Restless leg   • Darvon [Propoxyphene]    • Dilaudid [Hydromorphone Hcl]    • Flexeril [Cyclobenzaprine]    • Naproxen    • Nubain [Nalbuphine Hcl]    • Toradol [Ketorolac Tromethamine]    • Zanaflex [Tizanidine Hcl] Other (See Comments)     Restless Leg        Past Surgical History:   Procedure Laterality Date   • BACK SURGERY         History reviewed. No pertinent family history.    Social History     Socioeconomic History   • Marital status:  Significant Other     Spouse name: Not on file   • Number of children: Not on file   • Years of education: Not on file   • Highest education level: Not on file   Tobacco Use   • Smoking status: Current Every Day Smoker     Packs/day: 0.50   Substance and Sexual Activity   • Alcohol use: No   • Drug use: No           Objective   Physical Exam   Constitutional: He is oriented to person, place, and time. He appears well-developed and well-nourished. No distress.   HENT:   Head: Normocephalic and atraumatic.   Right Ear: External ear normal.   Left Ear: External ear normal.   Nose: Nose normal.   Eyes: Pupils are equal, round, and reactive to light. Conjunctivae are normal. No scleral icterus.   Neck: Normal range of motion. Neck supple. No JVD present. No thyromegaly present.   Cardiovascular: Normal rate, regular rhythm, normal heart sounds and intact distal pulses.   No murmur heard.  Pulmonary/Chest: Effort normal and breath sounds normal. No respiratory distress. He has no wheezes. He has no rales. He exhibits no tenderness.   Abdominal: Soft. Bowel sounds are normal. He exhibits no distension and no mass. There is no tenderness. There is no rebound and no guarding.   Musculoskeletal: Normal range of motion. He exhibits tenderness. He exhibits no edema.   Pain to palpation to the lumbar spine without step off or vertebral point tenderness noted; motor strength intact, sensory intact, neurovascular intact   Lymphadenopathy:     He has no cervical adenopathy.   Neurological: He is alert and oriented to person, place, and time. He has normal reflexes. No cranial nerve deficit. Coordination normal.   Skin: Skin is warm and dry. Capillary refill takes less than 2 seconds. No rash noted. He is not diaphoretic. No erythema. No pallor.   Psychiatric: He has a normal mood and affect. His behavior is normal. Judgment and thought content normal.   Nursing note and vitals reviewed.      Procedures           ED Course                                            MDM  Number of Diagnoses or Management Options  Chronic bilateral low back pain without sciatica: new and requires workup     Amount and/or Complexity of Data Reviewed  Tests in the radiology section of CPT®: ordered and reviewed  Discuss the patient with other providers: yes    Risk of Complications, Morbidity, and/or Mortality  Presenting problems: low  Diagnostic procedures: low  Management options: low    Patient Progress  Patient progress: improved      Final diagnoses:   Chronic bilateral low back pain without sciatica            Autumn Frederick, APRN  05/17/20 1757

## 2020-07-10 ENCOUNTER — APPOINTMENT (OUTPATIENT)
Dept: GENERAL RADIOLOGY | Facility: HOSPITAL | Age: 40
End: 2020-07-10

## 2020-07-10 ENCOUNTER — HOSPITAL ENCOUNTER (EMERGENCY)
Facility: HOSPITAL | Age: 40
Discharge: HOME | End: 2020-07-10
Attending: EMERGENCY MEDICINE | Admitting: EMERGENCY MEDICINE

## 2020-07-10 VITALS
SYSTOLIC BLOOD PRESSURE: 141 MMHG | TEMPERATURE: 98.1 F | DIASTOLIC BLOOD PRESSURE: 91 MMHG | HEIGHT: 66 IN | HEART RATE: 82 BPM | RESPIRATION RATE: 16 BRPM | OXYGEN SATURATION: 96 % | BODY MASS INDEX: 35.03 KG/M2 | WEIGHT: 218 LBS

## 2020-07-10 DIAGNOSIS — M79.605 PAIN OF LEFT LOWER EXTREMITY: Primary | ICD-10-CM

## 2020-07-10 LAB
ANION GAP SERPL CALCULATED.3IONS-SCNC: 11 MMOL/L (ref 5–15)
BASOPHILS # BLD AUTO: 0.1 10*3/MM3 (ref 0–0.2)
BASOPHILS NFR BLD AUTO: 1 % (ref 0–1.5)
BUN SERPL-MCNC: 13 MG/DL (ref 6–20)
BUN/CREAT SERPL: 18.8 (ref 7–25)
CALCIUM SPEC-SCNC: 9.4 MG/DL (ref 8.6–10.5)
CHLORIDE SERPL-SCNC: 104 MMOL/L (ref 98–107)
CO2 SERPL-SCNC: 26 MMOL/L (ref 22–29)
CREAT SERPL-MCNC: 0.69 MG/DL (ref 0.76–1.27)
D DIMER PPP FEU-MCNC: 0.31 MG/L (FEU) (ref 0–0.5)
DEPRECATED RDW RBC AUTO: 43.9 FL (ref 37–54)
EOSINOPHIL # BLD AUTO: 0.25 10*3/MM3 (ref 0–0.4)
EOSINOPHIL NFR BLD AUTO: 2.5 % (ref 0.3–6.2)
ERYTHROCYTE [DISTWIDTH] IN BLOOD BY AUTOMATED COUNT: 13.1 % (ref 12.3–15.4)
GFR SERPL CREATININE-BSD FRML MDRD: 127 ML/MIN/1.73
GLUCOSE SERPL-MCNC: 97 MG/DL (ref 65–99)
HCT VFR BLD AUTO: 46.5 % (ref 37.5–51)
HGB BLD-MCNC: 16.3 G/DL (ref 13–17.7)
HOLD SPECIMEN: NORMAL
HOLD SPECIMEN: NORMAL
IMM GRANULOCYTES # BLD AUTO: 0.03 10*3/MM3 (ref 0–0.05)
IMM GRANULOCYTES NFR BLD AUTO: 0.3 % (ref 0–0.5)
LYMPHOCYTES # BLD AUTO: 3.37 10*3/MM3 (ref 0.7–3.1)
LYMPHOCYTES NFR BLD AUTO: 33.9 % (ref 19.6–45.3)
MCH RBC QN AUTO: 32.1 PG (ref 26.6–33)
MCHC RBC AUTO-ENTMCNC: 35.1 G/DL (ref 31.5–35.7)
MCV RBC AUTO: 91.5 FL (ref 79–97)
MONOCYTES # BLD AUTO: 0.92 10*3/MM3 (ref 0.1–0.9)
MONOCYTES NFR BLD AUTO: 9.3 % (ref 5–12)
NEUTROPHILS NFR BLD AUTO: 5.27 10*3/MM3 (ref 1.7–7)
NEUTROPHILS NFR BLD AUTO: 53 % (ref 42.7–76)
NRBC BLD AUTO-RTO: 0 /100 WBC (ref 0–0.2)
PLATELET # BLD AUTO: 333 10*3/MM3 (ref 140–450)
PMV BLD AUTO: 10.5 FL (ref 6–12)
POTASSIUM SERPL-SCNC: 4.6 MMOL/L (ref 3.5–5.2)
RBC # BLD AUTO: 5.08 10*6/MM3 (ref 4.14–5.8)
SODIUM SERPL-SCNC: 141 MMOL/L (ref 136–145)
WBC # BLD AUTO: 9.94 10*3/MM3 (ref 3.4–10.8)
WHOLE BLOOD HOLD SPECIMEN: NORMAL
WHOLE BLOOD HOLD SPECIMEN: NORMAL

## 2020-07-10 PROCEDURE — 85025 COMPLETE CBC W/AUTO DIFF WBC: CPT | Performed by: EMERGENCY MEDICINE

## 2020-07-10 PROCEDURE — 80048 BASIC METABOLIC PNL TOTAL CA: CPT | Performed by: EMERGENCY MEDICINE

## 2020-07-10 PROCEDURE — 73502 X-RAY EXAM HIP UNI 2-3 VIEWS: CPT

## 2020-07-10 PROCEDURE — 99283 EMERGENCY DEPT VISIT LOW MDM: CPT

## 2020-07-10 PROCEDURE — 36415 COLL VENOUS BLD VENIPUNCTURE: CPT

## 2020-07-10 PROCEDURE — 73552 X-RAY EXAM OF FEMUR 2/>: CPT

## 2020-07-10 PROCEDURE — 85379 FIBRIN DEGRADATION QUANT: CPT | Performed by: EMERGENCY MEDICINE

## 2020-07-10 RX ORDER — OXYCODONE AND ACETAMINOPHEN 7.5; 325 MG/1; MG/1
1 TABLET ORAL ONCE
Status: COMPLETED | OUTPATIENT
Start: 2020-07-10 | End: 2020-07-10

## 2020-07-10 RX ORDER — METAXALONE 800 MG/1
800 TABLET ORAL 3 TIMES DAILY PRN
Qty: 12 TABLET | Refills: 0 | Status: SHIPPED | OUTPATIENT
Start: 2020-07-10 | End: 2020-12-14 | Stop reason: SDUPTHER

## 2020-07-10 RX ADMIN — OXYCODONE HYDROCHLORIDE AND ACETAMINOPHEN 1 TABLET: 7.5; 325 TABLET ORAL at 05:05

## 2020-07-10 NOTE — ED PROVIDER NOTES
Subjective   41 y/o male with history of chronic back and hip pain arrives now for evaluation of left lateral thigh pain that he states he woke up with. He denies any falls, trauma, fevers, chills, numbness or tingling, loss of sensation. He notes the pain is sharp and stabbing and denies any radiation of the pain. He notes it is worse with movement of his thigh. He denies any IVDA. He notes chronic back and hip pain noting he will need hip replacement surgery in the future. He denies weakness, worsening back pain, saddle anesthesia, loss of bowel or bladder control or other issues. He arrives in NAD.         Family, social and past history reviewed as below, prior documentation of H and Ps and other documentation are reviewed:    Past Medical History:  No date: Back pain  No date: Hypertension  No date: Neuropathy    Past Surgical History:  No date: BACK SURGERY    Social History    Socioeconomic History      Marital status: Significant Other      Spouse name: Not on file      Number of children: Not on file      Years of education: Not on file      Highest education level: Not on file    Tobacco Use      Smoking status: Current Every Day Smoker        Packs/day: 0.50    Substance and Sexual Activity      Alcohol use: No      Drug use: No      Family history: reviewed and noncontributory           Review of Systems   All other systems reviewed and are negative.      Past Medical History:   Diagnosis Date   • Back pain    • Hypertension    • Neuropathy        Allergies   Allergen Reactions   • Benadryl [Diphenhydramine] Other (See Comments)     Restless leg   • Darvon [Propoxyphene]    • Dilaudid [Hydromorphone Hcl]    • Flexeril [Cyclobenzaprine]    • Naproxen    • Nubain [Nalbuphine Hcl]    • Toradol [Ketorolac Tromethamine]    • Zanaflex [Tizanidine Hcl] Other (See Comments)     Restless Leg        Past Surgical History:   Procedure Laterality Date   • BACK SURGERY         History reviewed. No pertinent family  history.    Social History     Socioeconomic History   • Marital status: Significant Other     Spouse name: Not on file   • Number of children: Not on file   • Years of education: Not on file   • Highest education level: Not on file   Tobacco Use   • Smoking status: Current Every Day Smoker     Packs/day: 0.50   Substance and Sexual Activity   • Alcohol use: No   • Drug use: No           Objective   Physical Exam   Constitutional: He is oriented to person, place, and time. He appears well-developed and well-nourished.   HENT:   Head: Normocephalic and atraumatic.   Eyes: Pupils are equal, round, and reactive to light. Conjunctivae and EOM are normal.   Neck: Normal range of motion. Neck supple.   Musculoskeletal: Normal range of motion. He exhibits tenderness. He exhibits no edema or deformity.        Left hip: Normal.        Left knee: Normal.        Left ankle: Normal.        Legs:  Tenderness to area noted. There is no erythema, no fluctuance no crepitance, no signs of infection. He has pain through all ROm of the thigh and palpation over the area. Distally pulses and sensation are intact, DTR are intact. He has no PSIS pain, no midline back pain or tenderness, no step offs no deformities.     He has full strength of quad, hamstrings, calves. No pain on palpation of femoral head.     Femoral pulses are 2/4   Neurological: He is alert and oriented to person, place, and time. He displays normal reflexes. No cranial nerve deficit or sensory deficit. He exhibits normal muscle tone. Coordination normal.   Skin: Skin is warm. Capillary refill takes less than 2 seconds.   Psychiatric: He has a normal mood and affect.   Vitals reviewed.      Procedures           ED Course  ED Course as of Jul 10 0559   Fri Jul 10, 2020   0506 Unsure of etiology at this time of pain. The patient has had frequent visits to our ED and [facility] for chronic back pain (in fact, in the last year he has been seen just about every month in our ED  or [facility] for this). However, this appears to be his only visit for extremity pain. Given his tachycardia will do a dimer if elevated will do US although the location of his pain would be atypical for a DVT. His location of pain makes me think of IT band but he denies any recent exercise or trauma. Further, his pain description is atypical for radicular pain (sharp and stabbing) although this would too be on the differential. I see no signs of infection.     []   0558 PERC of 1 with tachycardia, with normal dimer and low Wells score no need for US at this time. Xrays are negative. No acute findings on labs. Will dc to home with [employer] relaxer and have follow up with pmd. Aware of reasons for return.     []      ED Course User Index  [] Earl Rene MD            XR Hip With or Without Pelvis 2 - 3 View Left   ED Interpretation   No acute osseous abnormality       XR Femur 2 View Left   ED Interpretation   No acute osseous abnormality         Labs Reviewed   BASIC METABOLIC PANEL - Abnormal; Notable for the following components:       Result Value    Creatinine 0.69 (*)     All other components within normal limits    Narrative:     GFR Normal >60  Chronic Kidney Disease <60  Kidney Failure <15     CBC WITH AUTO DIFFERENTIAL - Abnormal; Notable for the following components:    Lymphocytes, Absolute 3.37 (*)     Monocytes, Absolute 0.92 (*)     All other components within normal limits   D-DIMER, QUANTITATIVE - Normal    Narrative:     Reference Range is 0-0.50 mg/L FEU. However, results <0.50 mg/L FEU tends to rule out DVT or PE. Results >0.50 mg/L FEU are not useful in predicting absence or presence of DVT or PE.     RAINBOW DRAW    Narrative:     The following orders were created for panel order [place] Draw.  Procedure                               Abnormality         Status                     ---------                               -----------         ------                     Light Blue  Top[[id number]]                                   In process                 Green Top (Gel)[[id number]]                                  In process                 Lavender Top[[id number]]                                     In process                 Red Top[[id number]]                                          In process                   Please view results for these tests on the individual orders.   CBC AND DIFFERENTIAL    Narrative:     The following orders were created for panel order CBC & Differential.  Procedure                               Abnormality         Status                     ---------                               -----------         ------                     CBC Auto Differential[[id number]]        Abnormal            Final result                 Please view results for these tests on the individual orders.   LIGHT BLUE TOP   GREEN TOP   LAVENDER TOP   RED TOP                                       [facility]    Final diagnoses:   Pain of left lower extremity            Earl Rene MD  07/10/20 0559

## 2020-07-10 NOTE — DISCHARGE INSTRUCTIONS
Frederic,    I am unsure of the cause of your pain. Your labs today did not show any signs of infections or blood clots. Please be sure to follow up with your family doctor and return for worsening pain, fevers, chills or redness to the area. If you continue to have pain you may need an ultrasound.

## 2020-07-11 ENCOUNTER — HOSPITAL ENCOUNTER (EMERGENCY)
Age: 40
Discharge: HOME | End: 2020-07-12
Attending: EMERGENCY MEDICINE
Payer: MEDICARE

## 2020-07-11 PROCEDURE — 99282 EMERGENCY DEPT VISIT SF MDM: CPT

## 2020-07-12 VITALS
TEMPERATURE: 96.6 F | RESPIRATION RATE: 18 BRPM | DIASTOLIC BLOOD PRESSURE: 92 MMHG | SYSTOLIC BLOOD PRESSURE: 167 MMHG | OXYGEN SATURATION: 98 % | HEART RATE: 87 BPM

## 2020-07-12 PROCEDURE — 6360000002 HC RX W HCPCS: Performed by: EMERGENCY MEDICINE

## 2020-07-12 PROCEDURE — 96372 THER/PROPH/DIAG INJ SC/IM: CPT

## 2020-07-12 RX ORDER — PENICILLIN V POTASSIUM 500 MG/1
500 TABLET ORAL 4 TIMES DAILY
Qty: 40 TABLET | Refills: 0 | Status: SHIPPED | OUTPATIENT
Start: 2020-07-12 | End: 2020-07-22

## 2020-07-12 RX ORDER — MORPHINE SULFATE 4 MG/ML
4 INJECTION, SOLUTION INTRAMUSCULAR; INTRAVENOUS ONCE
Status: COMPLETED | OUTPATIENT
Start: 2020-07-12 | End: 2020-07-12

## 2020-07-12 RX ADMIN — MORPHINE SULFATE 4 MG: 4 INJECTION INTRAVENOUS at 00:23

## 2020-07-12 ASSESSMENT — PAIN DESCRIPTION - ORIENTATION: ORIENTATION: RIGHT;LOWER

## 2020-07-12 ASSESSMENT — ENCOUNTER SYMPTOMS
DIARRHEA: 0
FACIAL SWELLING: 0
ABDOMINAL PAIN: 0
VOICE CHANGE: 0
VOMITING: 0
TROUBLE SWALLOWING: 0

## 2020-07-12 ASSESSMENT — PAIN SCALES - GENERAL
PAINLEVEL_OUTOF10: 10
PAINLEVEL_OUTOF10: 10

## 2020-07-12 ASSESSMENT — PAIN DESCRIPTION - LOCATION: LOCATION: MOUTH

## 2020-07-12 NOTE — ED PROVIDER NOTES
[facility]  eMERGENCY dEPARTMENT eNCOUnter      Pt Name: Sheryl Ruvalcaba  MRN: [id number]  [place] 1980  Date of evaluation: 7/11/2020  Provider: Gi Curtis MD    [address] [place] [address]       Chief Complaint   Patient presents with    Dental Pain     Right front, bottom          HISTORY OF PRESENT ILLNESS   (Location/Symptom, Timing/Onset,Context/Setting, Quality, Duration, Modifying Factors, Severity)  Note limiting factors. Sheryl Ruvalcaba is a 36 y.o. male who presents to the emergency department due to dental pain. Patient has very poor dentition at baseline. Denies any dental injuries. Said that 1 of his lower teeth has been hurting since lunch. No injuries to it, just began hurting today. Worse when he eats. No fever chills or other constitutional symptoms. No trouble swallowing. HPI    NursingNotes were reviewed. REVIEW OF SYSTEMS    (2-9 systems for level 4, 10 or more for level 5)     Review of Systems   Constitutional: Negative for fever. HENT: Positive for dental problem. Negative for facial swelling, trouble swallowing and voice change. Gastrointestinal: Negative for abdominal pain, diarrhea and vomiting. Skin: Negative for rash. Neurological: Negative for weakness and headaches. All other systems reviewed and are negative. A complete review of systems was performed and is negative except as noted above in the HPI. PAST MEDICAL HISTORY     Past Medical History:   Diagnosis Date    Back pain     Chest pain     Scoliosis     Tobacco abuse     Vitamin D deficiency          SURGICAL HISTORY       Past Surgical History:   Procedure Laterality Date    BACK SURGERY      x 3          CURRENT MEDICATIONS       Previous Medications    IBUPROFEN (ADVIL;MOTRIN) 800 MG TABLET    Take 1 tablet by mouth every 8 hours as needed for Pain       ALLERGIES     Flexeril [cyclobenzaprine]; Toradol [ketorolac tromethamine];  Demerol hcl [meperidine]; and Other    FAMILY HISTORY       Family History   Problem Relation Age of Onset    Heart Disease Mother     Hypertension Mother     Cancer Father         prostate          SOCIAL HISTORY       Social History     Socioeconomic History    Marital status: Single     Spouse name: None    Number of children: None    Years of education: None    Highest education level: None   Occupational History    None   Social Needs    Financial resource strain: None    Food insecurity     Worry: None     Inability: None    Transportation needs     Medical: None     Non-medical: None   Tobacco Use    Smoking status: Current Every Day Smoker     Packs/day: 0.50    Smokeless tobacco: Never Used   Substance and Sexual Activity    Alcohol use: No    Drug use: No    Sexual activity: None   Lifestyle    Physical activity     Days per week: None     Minutes per session: None    Stress: None   Relationships    Social connections     Talks on phone: None     Gets together: None     Attends Protestant service: None     Active member of club or organization: None     Attends meetings of clubs or organizations: None     Relationship status: None    Intimate partner violence     Fear of current or ex partner: None     Emotionally abused: None     Physically abused: None     Forced sexual activity: None   Other Topics Concern    None   Social History Narrative    None       SCREENINGS             PHYSICAL EXAM    (up to 7 for level 4, 8 or more for level 5)     ED Triage Vitals [07/12/20 0000]   BP Temp Temp Source Pulse Resp SpO2 Height Weight   (!) 167/92 96.6 °F (35.9 °C) Infrared 87 18 98 % -- --       Physical Exam  Vitals signs reviewed. Constitutional:       General: He is not in acute distress. Appearance: He is well-developed. HENT:      Head: Normocephalic and atraumatic. Jaw: There is normal jaw occlusion. No trismus, swelling or malocclusion. Salivary Glands: Right salivary gland is not diffusely enlarged or tender. butoccasionally words are mis-transcribed.)    Rosita Genao MD (electronically signed)  AttendingEmergency Physician        Rosita Genao MD  07/12/20 0029

## 2020-08-01 ENCOUNTER — HOSPITAL ENCOUNTER (EMERGENCY)
Facility: HOSPITAL | Age: 40
Discharge: LEFT BEFORE DISCHARGE | End: 2020-08-01
Attending: EMERGENCY MEDICINE | Admitting: EMERGENCY MEDICINE

## 2020-08-01 ENCOUNTER — APPOINTMENT (OUTPATIENT)
Dept: CT IMAGING | Facility: HOSPITAL | Age: 40
End: 2020-08-01

## 2020-08-01 VITALS
DIASTOLIC BLOOD PRESSURE: 81 MMHG | RESPIRATION RATE: 23 BRPM | HEIGHT: 66 IN | WEIGHT: 218 LBS | HEART RATE: 91 BPM | TEMPERATURE: 97.8 F | OXYGEN SATURATION: 100 % | SYSTOLIC BLOOD PRESSURE: 135 MMHG | BODY MASS INDEX: 35.03 KG/M2

## 2020-08-01 DIAGNOSIS — M54.41 CHRONIC MIDLINE LOW BACK PAIN WITH RIGHT-SIDED SCIATICA: Primary | ICD-10-CM

## 2020-08-01 DIAGNOSIS — G89.29 CHRONIC MIDLINE LOW BACK PAIN WITH RIGHT-SIDED SCIATICA: Primary | ICD-10-CM

## 2020-08-01 PROCEDURE — 72131 CT LUMBAR SPINE W/O DYE: CPT

## 2020-08-01 PROCEDURE — 72128 CT CHEST SPINE W/O DYE: CPT

## 2020-08-01 PROCEDURE — 99283 EMERGENCY DEPT VISIT LOW MDM: CPT

## 2020-08-01 RX ORDER — ACETAMINOPHEN 500 MG
1000 TABLET ORAL ONCE
Status: COMPLETED | OUTPATIENT
Start: 2020-08-01 | End: 2020-08-01

## 2020-08-01 RX ORDER — LIDOCAINE 50 MG/G
1 PATCH TOPICAL
Status: DISCONTINUED | OUTPATIENT
Start: 2020-08-01 | End: 2020-08-01 | Stop reason: HOSPADM

## 2020-08-01 RX ADMIN — ACETAMINOPHEN 1000 MG: 500 TABLET, FILM COATED ORAL at 19:31

## 2020-08-01 RX ADMIN — LIDOCAINE 1 PATCH: 50 PATCH CUTANEOUS at 20:01

## 2020-08-02 NOTE — ED PROVIDER NOTES
Subjective   This is a 40-year-old gentleman with a past medical history of chronic back pain who presents to the emergency department worsening of his chronic back pain.  He states throughout the day today he had gradual onset worsening of his back pain.  It is dull.  Located in lower back.  Radiates around his anterior right thigh.  It is constant.  Moderate.  No exacerbating or relieving factors and no associated symptoms.  He denies any trauma.  He has no fevers, chills, nausea, vomiting, dysuria, hematuria, numbness, weakness, saddle paresthesias, urinary retention, urinary incontinence, or stool incontinence.    Past medical history: Chronic back pain  Social history: Smokes tobacco but denies illicit drug use or alcohol use      History provided by:  Patient      Review of Systems   All other systems reviewed and are negative.      Past Medical History:   Diagnosis Date   • Back pain    • Hypertension    • Neuropathy        Allergies   Allergen Reactions   • Benadryl [Diphenhydramine] Other (See Comments)     Restless leg   • Darvon [Propoxyphene]    • Dilaudid [Hydromorphone Hcl]    • Flexeril [Cyclobenzaprine]    • Naproxen    • Nubain [Nalbuphine Hcl]    • Toradol [Ketorolac Tromethamine]    • Zanaflex [Tizanidine Hcl] Other (See Comments)     Restless Leg        Past Surgical History:   Procedure Laterality Date   • BACK SURGERY         No family history on file.    Social History     Socioeconomic History   • Marital status: Significant Other     Spouse name: Not on file   • Number of children: Not on file   • Years of education: Not on file   • Highest education level: Not on file   Tobacco Use   • Smoking status: Current Every Day Smoker     Packs/day: 0.50   Substance and Sexual Activity   • Alcohol use: No   • Drug use: No           Objective   Physical Exam   Constitutional: He is oriented to person, place, and time. He appears well-developed and well-nourished. No distress.   HENT:   Head:  Normocephalic and atraumatic.   Eyes: Conjunctivae and EOM are normal. Right eye exhibits no discharge. Left eye exhibits no discharge.   Neck: Normal range of motion. Neck supple. No JVD present. No tracheal deviation present.   Cardiovascular: Normal rate, regular rhythm, normal heart sounds and intact distal pulses. Exam reveals no gallop and no friction rub.   No murmur heard.  Pulmonary/Chest: Effort normal and breath sounds normal. No stridor.   Abdominal: Soft. Bowel sounds are normal. He exhibits no distension. There is no tenderness.   Musculoskeletal: Normal range of motion. He exhibits tenderness. He exhibits no edema or deformity.   Tenderness to palpation of the lumbar spine and paraspinal areas bilaterally.   Neurological: He is alert and oriented to person, place, and time. No cranial nerve deficit or sensory deficit. He exhibits normal muscle tone. Coordination normal.   5 out of 5 strength in bilateral lower extremities, normal sensation to light touch in bilateral lower extremities.  Strong DP pulses in bilateral lower extremities.   Skin: Skin is warm and dry. Capillary refill takes less than 2 seconds. No rash noted. He is not diaphoretic. No pallor.   Psychiatric: He has a normal mood and affect. His behavior is normal.   Nursing note and vitals reviewed.      Procedures           ED Course                                           MDM  Number of Diagnoses or Management Options  Chronic midline low back pain with right-sided sciatica: new and requires workup  Diagnosis management comments: Patient presents with back pain.  Upon arrival he is in no acute distress and vital signs are reassuring.  He is given Tylenol and lidocaine patches for his pain.  He is evaluated with a CT scan of the thoracolumbar spine.  This shows no acute findings.  Of low concern for cauda equina syndrome with no urinary retention, urinary incontinence, stool incontinence, saddle anesthesias, numbness, weakness, or  paresthesias.  Low concern for malignancy with negative imaging and no history of malignancy.  Low concern for fracture with negative imaging and no history of trauma.  The patient is at low risk for occult infection as he is not immunocompromised and does not use IV drugs.  Overall, he is well-appearing and imaging is reassuring.  When I go to discuss this with him he demands opiates.  I tell him I would not prescribe him these.  I offer him steroids but he states he is allergic to them.  I offered lidocaine patches but he states they do not work.  I offer him NSAIDs however he states he is allergic to those as well.  As he is told he is not getting opiates he states he will just leave.  He will not wait for discharge paperwork.  He ambulates out of the emergency department with no difficulty whatsoever.  I do encourage him to return if he has any new or worsening symptoms and state we will always be happy to take care of him.       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

## 2020-08-02 NOTE — ED NOTES
Patient upset at this time due to not having opioid pain medication. Patient was made aware that at this time a lidoderm patch was prescribed for his pain.      Chadwick Leonardo RN  08/01/20 2022

## 2020-08-02 NOTE — ED NOTES
Dr. Glover at bedside to speak with patient over results. Patient left shortly after. Dr. Glover enters room. Dr. Glover states that patient left due to his refusal to administered opoid pain medication.      Chadwick Leonardo RN  08/01/20 2023

## 2020-08-09 ENCOUNTER — HOSPITAL ENCOUNTER (EMERGENCY)
Age: 40
Discharge: HOME | End: 2020-08-09
Payer: MEDICARE

## 2020-08-09 VITALS
HEIGHT: 66 IN | HEART RATE: 90 BPM | WEIGHT: 210 LBS | RESPIRATION RATE: 18 BRPM | DIASTOLIC BLOOD PRESSURE: 88 MMHG | TEMPERATURE: 98.7 F | OXYGEN SATURATION: 98 % | BODY MASS INDEX: 33.75 KG/M2 | SYSTOLIC BLOOD PRESSURE: 140 MMHG

## 2020-08-09 PROCEDURE — 99285 EMERGENCY DEPT VISIT HI MDM: CPT

## 2020-08-09 PROCEDURE — 2500000003 HC RX 250 WO HCPCS: Performed by: PHYSICIAN ASSISTANT

## 2020-08-09 PROCEDURE — 99284 EMERGENCY DEPT VISIT MOD MDM: CPT

## 2020-08-09 PROCEDURE — 96372 THER/PROPH/DIAG INJ SC/IM: CPT

## 2020-08-09 PROCEDURE — 6370000000 HC RX 637 (ALT 250 FOR IP): Performed by: PHYSICIAN ASSISTANT

## 2020-08-09 RX ORDER — LIDOCAINE HYDROCHLORIDE 20 MG/ML
15 SOLUTION OROPHARYNGEAL ONCE
Status: COMPLETED | OUTPATIENT
Start: 2020-08-09 | End: 2020-08-09

## 2020-08-09 RX ORDER — CLINDAMYCIN HYDROCHLORIDE 300 MG/1
300 CAPSULE ORAL 2 TIMES DAILY
Qty: 14 CAPSULE | Refills: 0 | Status: SHIPPED | OUTPATIENT
Start: 2020-08-09 | End: 2020-08-16

## 2020-08-09 RX ORDER — CLINDAMYCIN PHOSPHATE 150 MG/ML
600 INJECTION, SOLUTION INTRAVENOUS EVERY 8 HOURS
Status: DISCONTINUED | OUTPATIENT
Start: 2020-08-09 | End: 2020-08-09 | Stop reason: HOSPADM

## 2020-08-09 RX ADMIN — BENZOCAINE: 200 SPRAY DENTAL; ORAL; PERIODONTAL at 15:05

## 2020-08-09 RX ADMIN — CLINDAMYCIN PHOSPHATE 600 MG: 150 INJECTION, SOLUTION INTRAVENOUS at 15:05

## 2020-08-09 RX ADMIN — LIDOCAINE HYDROCHLORIDE 15 ML: 20 SOLUTION ORAL; TOPICAL at 15:05

## 2020-08-09 NOTE — ED PROVIDER NOTES
[facility] EMERGENCY DEPT  eMERGENCYdEPARTMENT eNCOUnter      Pt Name: Alyson Kwan  MRN: [id number]  Birthdate 1980  Date of evaluation: 8/9/2020  Provider:ARISTIDES Person    CHIEF COMPLAINT       Chief Complaint   Patient presents with    Dental Pain     starting this AM, broken tooth \"for a while\"         HISTORY OF PRESENT ILLNESS  (Location/Symptom, Timing/Onset, Context/Setting, Quality, Duration, Modifying Factors, Severity.)   Alyson Kwan is a 36 y.o. male who presents to the emergency department with complaints of dental pain started yesterday poor dentition multiple carries. Hx of multiple teeth requiring pulling. This is his first mandibular incisor right side that he is complaining of. No trismus or fever. No facial swelling. No trouble swelling. HPI    Nursing Notes were reviewed and I agree. REVIEW OF SYSTEMS    (2-9 systems for level 4, 10 or more for level 5)     Review of Systems   Constitutional: Negative for activity change, appetite change, chills and fever. HENT: Positive for dental problem. Negative for congestion. Eyes: Negative for photophobia, discharge and itching. Respiratory: Negative for apnea, cough and shortness of breath. Cardiovascular: Negative for chest pain. Musculoskeletal: Negative for arthralgias, back pain, gait problem, myalgias and neck pain. Skin: Negative for color change, pallor and rash. Neurological: Negative for dizziness, seizures and syncope. Psychiatric/Behavioral: Negative for agitation. The patient is not nervous/anxious. Except as noted above the remainder of the review of systems was reviewed and negative.        PAST MEDICAL HISTORY     Past Medical History:   Diagnosis Date    Back pain     Chest pain     Scoliosis     Tobacco abuse     Vitamin D deficiency          SURGICAL HISTORY       Past Surgical History:   Procedure Laterality Date    BACK SURGERY      x 3          CURRENT MEDICATIONS       Discharge Medication List as of 8/9/2020  2:58 PM          ALLERGIES     Flexeril [cyclobenzaprine]; Toradol [ketorolac tromethamine];  Demerol hcl [meperidine]; and Other    FAMILY HISTORY       Family History   Problem Relation Age of Onset    Heart Disease Mother     Hypertension Mother     Cancer Father         prostate          SOCIAL HISTORY       Social History     Socioeconomic History    Marital status: Single     Spouse name: None    Number of children: None    Years of education: None    Highest education level: None   Occupational History    None   Social Needs    Financial resource strain: None    Food insecurity     Worry: None     Inability: None    Transportation needs     Medical: None     Non-medical: None   Tobacco Use    Smoking status: Current Every Day Smoker     Packs/day: 0.50    Smokeless tobacco: Never Used   Substance and Sexual Activity    Alcohol use: No    Drug use: No    Sexual activity: None   Lifestyle    Physical activity     Days per week: None     Minutes per session: None    Stress: None   Relationships    Social connections     Talks on phone: None     Gets together: None     Attends Orthodox service: None     Active member of club or organization: None     Attends meetings of clubs or organizations: None     Relationship status: None    Intimate partner violence     Fear of current or ex partner: None     Emotionally abused: None     Physically abused: None     Forced sexual activity: None   Other Topics Concern    None   Social History Narrative    None       SCREENINGS    [place] Coma Scale  Eye Opening: Spontaneous  Best Verbal Response: Oriented  Best Motor Response: Obeys commands  [place] Coma Scale Score: 15      PHYSICAL EXAM    (up to 7 forlevel 4, 8 or more for level 5)     ED Triage Vitals [08/09/20 1415]   BP Temp Temp src Pulse Resp SpO2 Height Weight   (!) 148/95 98.7 °F (37.1 °C) -- 98 18 98 % 5' 6\" (1.676 m) 210 lb (95.3 kg)       Physical Exam  Vitals signs and nursing note reviewed. Constitutional:       General: He is not in acute distress. Appearance: Normal appearance. He is well-developed. He is not diaphoretic. HENT:      Head: Normocephalic and atraumatic. Right Ear: Tympanic membrane, ear canal and external ear normal.      Left Ear: Tympanic membrane, ear canal and external ear normal.      Nose: Nose normal.      Mouth/Throat:      Mouth: Mucous membranes are moist.   Eyes:      Pupils: Pupils are equal, round, and reactive to light. Neck:      Musculoskeletal: Normal range of motion and neck supple. Trachea: No tracheal deviation. Cardiovascular:      Rate and Rhythm: Normal rate and regular rhythm. Pulses: Normal pulses. Heart sounds: Normal heart sounds. No murmur. Pulmonary:      Effort: Pulmonary effort is normal.      Breath sounds: Normal breath sounds. No stridor. No wheezing. Chest:      Chest wall: No tenderness. Abdominal:      General: Abdomen is flat. Bowel sounds are normal. There is no distension. Palpations: Abdomen is soft. Tenderness: There is no abdominal tenderness. Musculoskeletal: Normal range of motion. Skin:     General: Skin is warm and dry. Capillary Refill: Capillary refill takes less than 2 seconds. Neurological:      General: No focal deficit present. Mental Status: He is alert and oriented to person, place, and time. Mental status is at baseline. Psychiatric:         Mood and Affect: Mood normal.         Behavior: Behavior normal.         Thought Content:  Thought content normal.         Judgment: Judgment normal.           DIAGNOSTIC RESULTS     RADIOLOGY:   Non-plain film images such as CT, Ultrasound and MRI are read by the radiologist. Plain radiographic images are visualized and preliminarilyinterpreted by No att. providers found with the below findings:      Interpretation per the Radiologist below, if available at the time of this note:    No orders to display LABS:  Labs Reviewed - No data to display    All other labs were within normal range or notreturned as of this dictation. RE-ASSESSMENT        EMERGENCY DEPARTMENT COURSE and DIFFERENTIAL DIAGNOSIS/MDM:   Vitals:    Vitals:    08/09/20 1415 08/09/20 1428 08/09/20 1526   BP: (!) 148/95  (!) 140/88   Pulse: 98 90    Resp: 18     Temp: 98.7 °F (37.1 °C)     SpO2: 98%     Weight: 210 lb (95.3 kg)     Height: 5' 6\" (1.676 m)         MDM  Patient has relieve here with topical analgesics Plan for follow with health department tomorrow for tooth extraction with abx prophylaxis. Plan for discharge. Nerve block declined. PROCEDURES:    Procedures      FINAL IMPRESSION      1.  Pain due to dental caries          DISPOSITION/PLAN   DISPOSITION Decision To Discharge 08/09/2020 03:26:01 PM      PATIENT REFERRED TO:  [address] [place] [address] [facility]  [facility]  [phone]    If symptoms worsen    [address] [address] [phone]    [address]          DISCHARGE MEDICATIONS:  Discharge Medication List as of 8/9/2020  2:58 PM      START taking these medications    Details   clindamycin (CLEOCIN) 300 MG capsule Take 1 capsule by mouth 2 times daily for 7 days, Disp-14 capsule,R-0Print             (Please note that portions of this note were completed with a voice recognition program.  Efforts were made to edit the dictations but occasionallywords are mis-transcribed.)    Rita Alves [address] [place] [address]  08/10/20 1614

## 2020-08-10 ASSESSMENT — ENCOUNTER SYMPTOMS
COLOR CHANGE: 0
PHOTOPHOBIA: 0
SHORTNESS OF BREATH: 0
EYE ITCHING: 0
EYE DISCHARGE: 0
APNEA: 0
COUGH: 0
BACK PAIN: 0

## 2020-08-16 ENCOUNTER — APPOINTMENT (OUTPATIENT)
Dept: CT IMAGING | Facility: HOSPITAL | Age: 40
End: 2020-08-16

## 2020-08-16 ENCOUNTER — HOSPITAL ENCOUNTER (EMERGENCY)
Facility: HOSPITAL | Age: 40
Discharge: HOME | End: 2020-08-16
Attending: INTERNAL MEDICINE | Admitting: INTERNAL MEDICINE

## 2020-08-16 VITALS
RESPIRATION RATE: 18 BRPM | OXYGEN SATURATION: 98 % | BODY MASS INDEX: 35.03 KG/M2 | WEIGHT: 218 LBS | HEIGHT: 66 IN | DIASTOLIC BLOOD PRESSURE: 82 MMHG | HEART RATE: 83 BPM | SYSTOLIC BLOOD PRESSURE: 140 MMHG | TEMPERATURE: 97.9 F

## 2020-08-16 DIAGNOSIS — S20.221A CONTUSION OF RIGHT BACK WALL OF THORAX, INITIAL ENCOUNTER: Primary | ICD-10-CM

## 2020-08-16 PROCEDURE — 96372 THER/PROPH/DIAG INJ SC/IM: CPT

## 2020-08-16 PROCEDURE — 72128 CT CHEST SPINE W/O DYE: CPT

## 2020-08-16 PROCEDURE — 25010000002 ORPHENADRINE CITRATE PER 60 MG: Performed by: INTERNAL MEDICINE

## 2020-08-16 PROCEDURE — 99283 EMERGENCY DEPT VISIT LOW MDM: CPT

## 2020-08-16 RX ORDER — ORPHENADRINE CITRATE 30 MG/ML
60 INJECTION INTRAMUSCULAR; INTRAVENOUS ONCE
Status: COMPLETED | OUTPATIENT
Start: 2020-08-16 | End: 2020-08-16

## 2020-08-16 RX ADMIN — ORPHENADRINE CITRATE 60 MG: 30 INJECTION INTRAMUSCULAR; INTRAVENOUS at 02:37

## 2020-08-16 NOTE — ED PROVIDER NOTES
Subjective   Patient is a 40-year-old gentleman who was out fishing earlier today he states that he has had numerous back surgeries and during the course of fishing he slipped and fell down the bank and started having pain in his mid upper back which is in the same area has had previous back surgery.  He denies issues with range of motion or weightbearing he states that he still has good sensation throughout and good mobility just hurts in his back.          Review of Systems   Constitutional: Negative for chills and fever.   HENT: Negative for congestion, ear pain and sinus pressure.    Eyes: Negative for photophobia, pain and visual disturbance.   Respiratory: Negative for cough, chest tightness, shortness of breath and wheezing.    Cardiovascular: Negative for chest pain and palpitations.   Gastrointestinal: Negative for abdominal pain, diarrhea and nausea.   Endocrine: Negative for cold intolerance and heat intolerance.   Genitourinary: Negative for difficulty urinating and urgency.   Musculoskeletal: Positive for back pain. Negative for arthralgias, joint swelling and myalgias.   Skin: Negative for color change and wound.   Neurological: Negative for dizziness and headaches.   Hematological: Negative for adenopathy. Does not bruise/bleed easily.   Psychiatric/Behavioral: Negative for agitation, behavioral problems, confusion and decreased concentration.       Past Medical History:   Diagnosis Date   • Back pain    • Hypertension    • Neuropathy        Allergies   Allergen Reactions   • Benadryl [Diphenhydramine] Other (See Comments)     Restless leg   • Darvon [Propoxyphene]    • Dilaudid [Hydromorphone Hcl]    • Flexeril [Cyclobenzaprine]    • Naproxen    • Nubain [Nalbuphine Hcl]    • Toradol [Ketorolac Tromethamine]    • Zanaflex [Tizanidine Hcl] Other (See Comments)     Restless Leg        Past Surgical History:   Procedure Laterality Date   • BACK SURGERY         History reviewed. No pertinent family  history.    Social History     Socioeconomic History   • Marital status:      Spouse name: Not on file   • Number of children: Not on file   • Years of education: Not on file   • Highest education level: Not on file   Tobacco Use   • Smoking status: Current Every Day Smoker     Packs/day: 0.50     Types: Cigarettes   Substance and Sexual Activity   • Alcohol use: No   • Drug use: No   • Sexual activity: Defer           Objective   Physical Exam   Constitutional: He is oriented to person, place, and time. He appears well-developed and well-nourished.   HENT:   Head: Normocephalic and atraumatic.   Mouth/Throat: Oropharynx is clear and moist.   Eyes: Pupils are equal, round, and reactive to light. EOM are normal.   Neck: Normal range of motion. Neck supple.   Cardiovascular: Normal rate, regular rhythm, normal heart sounds and intact distal pulses.   Pulmonary/Chest: Effort normal and breath sounds normal.   Abdominal: Soft. Bowel sounds are normal. There is no tenderness.   Musculoskeletal: Normal range of motion. He exhibits tenderness ( Tenderness on palpation of the parathoracic spinal area worse on the right than on the left.). He exhibits no edema.   Neurological: He is alert and oriented to person, place, and time. He has normal reflexes. No cranial nerve deficit.   Skin: Skin is warm and dry. No rash noted.   Psychiatric: He has a normal mood and affect. His behavior is normal. Thought content normal.   Nursing note and vitals reviewed.      Procedures           ED Course                                           MDM    Final diagnoses:   Contusion of right back wall of thorax, initial encounter            Alden Lennon MD  08/16/20 0519

## 2020-08-20 ENCOUNTER — NURSE TRIAGE (OUTPATIENT)
Dept: OTHER | Facility: CLINIC | Age: 40
End: 2020-08-20

## 2020-08-20 NOTE — TELEPHONE ENCOUNTER
Reason for Disposition   RN needs further essential information from caller in order to complete triage    Protocols used: INFORMATION ONLY CALL - NO TRIAGE-ADULT-AH    Pt was sent over from [facility] to this writer with incorrect information. Writer was told pt had BP of 200/110 but when speaking with pt, pt did not know what his BP was currently. The BP given to [facility] was one that was obtained in ED a few days prior. Pt stated to writer that he doesn't have a way to check his BP but he thinks it is up again. Pt unable to give writer any symptoms for feeling like BP was elevated. Pt was told to go to ED if he felt it was high due to not being able to complete an accurate assessment with information given. Pt stated he was going to go into the drug store and have them take it there. Pt encouraged again to go to ED if he felt it was high. Please do not respond to the triage nurse through this encounter. Any subsequent communication should be directly with the patient.

## 2020-08-23 ENCOUNTER — HOSPITAL ENCOUNTER (EMERGENCY)
Facility: HOSPITAL | Age: 40
Discharge: HOME | End: 2020-08-23
Attending: EMERGENCY MEDICINE | Admitting: EMERGENCY MEDICINE

## 2020-08-23 ENCOUNTER — APPOINTMENT (OUTPATIENT)
Dept: CT IMAGING | Facility: HOSPITAL | Age: 40
End: 2020-08-23

## 2020-08-23 VITALS
TEMPERATURE: 98.7 F | WEIGHT: 218 LBS | SYSTOLIC BLOOD PRESSURE: 146 MMHG | HEIGHT: 66 IN | DIASTOLIC BLOOD PRESSURE: 91 MMHG | BODY MASS INDEX: 35.03 KG/M2 | OXYGEN SATURATION: 97 % | RESPIRATION RATE: 18 BRPM | HEART RATE: 91 BPM

## 2020-08-23 DIAGNOSIS — G43.809 OTHER MIGRAINE WITHOUT STATUS MIGRAINOSUS, NOT INTRACTABLE: Primary | ICD-10-CM

## 2020-08-23 PROCEDURE — 96375 TX/PRO/DX INJ NEW DRUG ADDON: CPT

## 2020-08-23 PROCEDURE — 25010000002 PROMETHAZINE PER 50 MG: Performed by: EMERGENCY MEDICINE

## 2020-08-23 PROCEDURE — 70450 CT HEAD/BRAIN W/O DYE: CPT

## 2020-08-23 PROCEDURE — 99283 EMERGENCY DEPT VISIT LOW MDM: CPT

## 2020-08-23 PROCEDURE — 25010000002 PROCHLORPERAZINE 10 MG/2ML SOLUTION: Performed by: EMERGENCY MEDICINE

## 2020-08-23 PROCEDURE — 96374 THER/PROPH/DIAG INJ IV PUSH: CPT

## 2020-08-23 PROCEDURE — 96372 THER/PROPH/DIAG INJ SC/IM: CPT

## 2020-08-23 RX ORDER — SUMATRIPTAN 6 MG/.5ML
6 INJECTION, SOLUTION SUBCUTANEOUS ONCE
Status: COMPLETED | OUTPATIENT
Start: 2020-08-23 | End: 2020-08-23

## 2020-08-23 RX ORDER — METHYLPREDNISOLONE 4 MG/1
TABLET ORAL
Qty: 21 EACH | Refills: 0 | OUTPATIENT
Start: 2020-08-23 | End: 2020-09-02 | Stop reason: HOSPADM

## 2020-08-23 RX ORDER — PROCHLORPERAZINE EDISYLATE 5 MG/ML
10 INJECTION INTRAMUSCULAR; INTRAVENOUS ONCE
Status: COMPLETED | OUTPATIENT
Start: 2020-08-23 | End: 2020-08-23

## 2020-08-23 RX ORDER — PROMETHAZINE HYDROCHLORIDE 25 MG/ML
12.5 INJECTION, SOLUTION INTRAMUSCULAR; INTRAVENOUS ONCE
Status: COMPLETED | OUTPATIENT
Start: 2020-08-23 | End: 2020-08-23

## 2020-08-23 RX ADMIN — SODIUM CHLORIDE 500 ML: 9 INJECTION, SOLUTION INTRAVENOUS at 05:00

## 2020-08-23 RX ADMIN — PROMETHAZINE HYDROCHLORIDE 12.5 MG: 25 INJECTION INTRAMUSCULAR; INTRAVENOUS at 06:26

## 2020-08-23 RX ADMIN — SUMATRIPTAN 6 MG: 6 INJECTION, SOLUTION SUBCUTANEOUS at 04:56

## 2020-08-23 RX ADMIN — PROCHLORPERAZINE EDISYLATE 10 MG: 5 INJECTION INTRAMUSCULAR; INTRAVENOUS at 05:00

## 2020-08-23 NOTE — ED PROVIDER NOTES
Subjective   41 y/o male arrives for evaluation of a migraine. He has never been diagnosed as having migraines but expresses frequent headaches behind his eyes with photo and phonophobia that generally marcellus with Excedrin. As it did not improve with this medication at this time he did present for evaluation. He cannot tell me how long he has had headaches for but has never seen a physician for it nor has he had any imaging. He denies they are worse with ambulation or position changes. He denies worst headache of his life or thunderclap. He denies weakness, numbness, tingling, loss of sensation, falls, trauma or any other issues. He arrives in [facility].         Family, social and past history reviewed as below, prior documentation of H and Ps and other documentation are reviewed:    Past Medical History:  No date: Back pain  No date: Hypertension  No date: Neuropathy    Past Surgical History:  No date: BACK SURGERY    Social History    Socioeconomic History      Marital status:       Spouse name: Not on file      Number of children: Not on file      Years of education: Not on file      Highest education level: Not on file    Tobacco Use      Smoking status: Current Every Day Smoker        Packs/day: 0.50        Types: Cigarettes    Substance and Sexual Activity      Alcohol use: No      Drug use: No      Sexual activity: Defer      Family history: reviewed and noncontributory           Review of Systems   All other systems reviewed and are negative.      Past Medical History:   Diagnosis Date   • Back pain    • Hypertension    • Neuropathy        Allergies   Allergen Reactions   • Benadryl [Diphenhydramine] Other (See Comments)     Restless leg   • Darvon [Propoxyphene]    • Dilaudid [Hydromorphone Hcl]    • Flexeril [Cyclobenzaprine]    • Naproxen    • Nubain [Nalbuphine Hcl]    • Toradol [Ketorolac Tromethamine]    • Zanaflex [Tizanidine Hcl] Other (See Comments)     Restless Leg        Past Surgical History:    Procedure Laterality Date   • BACK SURGERY         No family history on file.    Social History     Socioeconomic History   • Marital status:      Spouse name: Not on file   • Number of children: Not on file   • Years of education: Not on file   • Highest education level: Not on file   Tobacco Use   • Smoking status: Current Every Day Smoker     Packs/day: 0.50     Types: Cigarettes   Substance and Sexual Activity   • Alcohol use: No   • Drug use: No   • Sexual activity: Defer           Objective   Physical Exam   Constitutional: He is oriented to person, place, and time. He appears well-developed and well-nourished.   HENT:   Head: Normocephalic and atraumatic.   Eyes: Pupils are equal, round, and reactive to light. Conjunctivae and EOM are normal.   Neck: Normal range of motion. Neck supple.   Musculoskeletal: Normal range of motion. He exhibits no edema, tenderness or deformity.   Neurological: He is alert and oriented to person, place, and time. No cranial nerve deficit or sensory deficit. He exhibits normal muscle tone.   Skin: Skin is warm. Capillary refill takes less than 2 seconds.   Psychiatric: He has a normal mood and affect. His behavior is normal.   Vitals reviewed.      Procedures           ED Course  ED Course as of Aug 23 0625   Sun Aug 23, 2020   0607 The patient apparently defecated into the bedside trash can. When asked why he apparently stated he couldn't make it to the bathroom.     He is obviously in no distress given the above we will wait on his CT results and Dc.     [JH]      ED Course User Index  [JH] Earl Rene MD            CT Head Without Contrast    (Results Pending)     Labs Reviewed - No data to display                                    MDM    Final diagnoses:   Other migraine without status migrainosus, not intractable            Earl Rene MD  08/23/20 0625

## 2020-09-02 ENCOUNTER — HOSPITAL ENCOUNTER (EMERGENCY)
Facility: HOSPITAL | Age: 40
Discharge: HOME | End: 2020-09-02
Admitting: EMERGENCY MEDICINE

## 2020-09-02 VITALS
TEMPERATURE: 98.4 F | HEIGHT: 66 IN | RESPIRATION RATE: 20 BRPM | SYSTOLIC BLOOD PRESSURE: 154 MMHG | WEIGHT: 224 LBS | BODY MASS INDEX: 36 KG/M2 | OXYGEN SATURATION: 100 % | HEART RATE: 87 BPM | DIASTOLIC BLOOD PRESSURE: 105 MMHG

## 2020-09-02 DIAGNOSIS — H66.91 RIGHT OTITIS MEDIA, UNSPECIFIED OTITIS MEDIA TYPE: Primary | ICD-10-CM

## 2020-09-02 DIAGNOSIS — Z72.0 TOBACCO ABUSE: ICD-10-CM

## 2020-09-02 PROCEDURE — 99282 EMERGENCY DEPT VISIT SF MDM: CPT

## 2020-09-02 RX ORDER — AMOXICILLIN AND CLAVULANATE POTASSIUM 875; 125 MG/1; MG/1
1 TABLET, FILM COATED ORAL 2 TIMES DAILY
Qty: 20 TABLET | Refills: 0 | Status: SHIPPED | OUTPATIENT
Start: 2020-09-02 | End: 2020-09-12

## 2020-09-02 NOTE — ED PROVIDER NOTES
Subjective   Patient is a 40-year-old white male presents emergency department with right ear pain that started this morning.  He denies any injury to the ear.  He does have a history of ear infections to the right ear.  He does smoke about a pack and a half a day.  He denies any cough or congestion.  No sore throat.  No fever or chills.  No symptoms of COVID-19.  No known exposure to COVID-19.  No other complaints.      History provided by:  Patient   used: No        Review of Systems   Constitutional: Negative.    HENT:        Patient is a 40-year-old white male presents emergency department with right ear pain that started this morning.  He denies any injury to the ear.  He does have a history of ear infections to the right ear.  He does smoke about a pack and a half a day.  He denies any cough or congestion.  No sore throat.  No fever or chills.  No symptoms of COVID-19.  No known exposure to COVID-19.  No other complaints.   Eyes: Negative.    Respiratory: Negative.    Cardiovascular: Negative.    Gastrointestinal: Negative.    Endocrine: Negative.    Genitourinary: Negative.    Musculoskeletal: Negative.    Skin: Negative.    Allergic/Immunologic: Negative.    Neurological: Negative.    Hematological: Negative.    Psychiatric/Behavioral: Negative.    All other systems reviewed and are negative.      Past Medical History:   Diagnosis Date   • Back pain    • Hypertension    • Neuropathy        Allergies   Allergen Reactions   • Benadryl [Diphenhydramine] Other (See Comments)     Restless leg   • Darvon [Propoxyphene]    • Dilaudid [Hydromorphone Hcl]    • Flexeril [Cyclobenzaprine]    • Naproxen    • Nubain [Nalbuphine Hcl]    • Toradol [Ketorolac Tromethamine]    • Zanaflex [Tizanidine Hcl] Other (See Comments)     Restless Leg        Past Surgical History:   Procedure Laterality Date   • BACK SURGERY         No family history on file.    Social History     Socioeconomic History   • Marital  "status:      Spouse name: Not on file   • Number of children: Not on file   • Years of education: Not on file   • Highest education level: Not on file   Tobacco Use   • Smoking status: Current Every Day Smoker     Packs/day: 0.50     Types: Cigarettes   Substance and Sexual Activity   • Alcohol use: No   • Drug use: No   • Sexual activity: Defer       Prior to Admission medications    Medication Sig Start Date End Date Taking? Authorizing Provider   gabapentin (NEURONTIN) 400 MG capsule Take 400 mg by mouth 4 (Four) Times a Day.    Kiersten Salcedo MD   lisinopril (PRINIVIL,ZESTRIL) 10 MG tablet Take 10 mg by mouth Daily.    ProviderKiersten MD   metaxalone (SKELAXIN) 800 MG tablet Take 1 tablet by mouth 3 (Three) Times a Day As Needed for Muscle Spasms. 7/10/20   Earl Rene MD   methylPREDNISolone (MEDROL) 4 MG dose pack Take as directed on package instructions. 8/23/20   Earl Rene MD       BP (!) 154/105 (BP Location: Right arm, Patient Position: Sitting)   Pulse 87   Temp 98.4 °F (36.9 °C) (Oral)   Resp 20   Ht 167.6 cm (66\")   Wt 102 kg (224 lb)   SpO2 100%   BMI 36.15 kg/m²     Objective   Physical Exam   Constitutional: He is oriented to person, place, and time. He appears well-developed and well-nourished.   HENT:   Head: Normocephalic and atraumatic.   Right TM is slightly erythematous with serous drainage noted.  TM is intact.  Left TM is normal.   Eyes: Pupils are equal, round, and reactive to light. Conjunctivae and EOM are normal.   Neck: Normal range of motion. Neck supple.   Cardiovascular: Normal rate, regular rhythm, normal heart sounds and intact distal pulses.   Pulmonary/Chest: Effort normal and breath sounds normal.   Abdominal: Soft. Bowel sounds are normal.   Musculoskeletal: Normal range of motion.   Neurological: He is alert and oriented to person, place, and time. He has normal reflexes.   Skin: Skin is warm and dry.   Psychiatric: He has a " normal mood and affect. His behavior is normal. Judgment and thought content normal.   Nursing note and vitals reviewed.      Procedures         Lab Results (last 24 hours)     ** No results found for the last 24 hours. **          No orders to display       ED Course          MDM  Number of Diagnoses or Management Options  Right otitis media, unspecified otitis media type: minor  Tobacco abuse: minor  Patient Progress  Patient progress: stable      Final diagnoses:   Right otitis media, unspecified otitis media type   Tobacco abuse          Debbi Dickens, APRN  09/02/20 7224

## 2020-09-17 ENCOUNTER — HOSPITAL ENCOUNTER (EMERGENCY)
Age: 40
Discharge: HOME | End: 2020-09-17
Attending: EMERGENCY MEDICINE
Payer: MEDICARE

## 2020-09-17 ENCOUNTER — APPOINTMENT (OUTPATIENT)
Dept: GENERAL RADIOLOGY | Age: 40
End: 2020-09-17
Payer: MEDICARE

## 2020-09-17 VITALS
TEMPERATURE: 97.3 F | SYSTOLIC BLOOD PRESSURE: 143 MMHG | OXYGEN SATURATION: 95 % | WEIGHT: 215 LBS | DIASTOLIC BLOOD PRESSURE: 92 MMHG | HEIGHT: 66 IN | BODY MASS INDEX: 34.55 KG/M2 | HEART RATE: 77 BPM | RESPIRATION RATE: 14 BRPM

## 2020-09-17 LAB
ALBUMIN SERPL-MCNC: 4.1 G/DL (ref 3.5–5.2)
ALP BLD-CCNC: 123 U/L (ref 40–130)
ALT SERPL-CCNC: 40 U/L (ref 5–41)
ANION GAP SERPL CALCULATED.3IONS-SCNC: 10 MMOL/L (ref 7–19)
AST SERPL-CCNC: 21 U/L (ref 5–40)
BASOPHILS ABSOLUTE: 0.1 K/UL (ref 0–0.2)
BASOPHILS RELATIVE PERCENT: 1.1 % (ref 0–1)
BILIRUB SERPL-MCNC: 0.4 MG/DL (ref 0.2–1.2)
BUN BLDV-MCNC: 11 MG/DL (ref 6–20)
CALCIUM SERPL-MCNC: 9.2 MG/DL (ref 8.6–10)
CHLORIDE BLD-SCNC: 104 MMOL/L (ref 98–111)
CO2: 27 MMOL/L (ref 22–29)
CREAT SERPL-MCNC: 0.7 MG/DL (ref 0.5–1.2)
D DIMER: <0.27 UG/ML FEU (ref 0–0.48)
EOSINOPHILS ABSOLUTE: 0.2 K/UL (ref 0–0.6)
EOSINOPHILS RELATIVE PERCENT: 2.8 % (ref 0–5)
GFR AFRICAN AMERICAN: >59
GFR NON-AFRICAN AMERICAN: >60
GLUCOSE BLD-MCNC: 124 MG/DL (ref 74–109)
HCT VFR BLD CALC: 46.3 % (ref 42–52)
HEMOGLOBIN: 15.6 G/DL (ref 14–18)
IMMATURE GRANULOCYTES #: 0 K/UL
LYMPHOCYTES ABSOLUTE: 3 K/UL (ref 1.1–4.5)
LYMPHOCYTES RELATIVE PERCENT: 39.9 % (ref 20–40)
MCH RBC QN AUTO: 32.6 PG (ref 27–31)
MCHC RBC AUTO-ENTMCNC: 33.7 G/DL (ref 33–37)
MCV RBC AUTO: 96.9 FL (ref 80–94)
MONOCYTES ABSOLUTE: 0.5 K/UL (ref 0–0.9)
MONOCYTES RELATIVE PERCENT: 6.3 % (ref 0–10)
NEUTROPHILS ABSOLUTE: 3.8 K/UL (ref 1.5–7.5)
NEUTROPHILS RELATIVE PERCENT: 49.8 % (ref 50–65)
PDW BLD-RTO: 12.7 % (ref 11.5–14.5)
PLATELET # BLD: 336 K/UL (ref 130–400)
PMV BLD AUTO: 10.8 FL (ref 9.4–12.4)
POTASSIUM SERPL-SCNC: 3.9 MMOL/L (ref 3.5–5)
RBC # BLD: 4.78 M/UL (ref 4.7–6.1)
SODIUM BLD-SCNC: 141 MMOL/L (ref 136–145)
TOTAL PROTEIN: 6.4 G/DL (ref 6.6–8.7)
TROPONIN: <0.01 NG/ML (ref 0–0.03)
TROPONIN: <0.01 NG/ML (ref 0–0.03)
WBC # BLD: 7.6 K/UL (ref 4.8–10.8)

## 2020-09-17 PROCEDURE — 85379 FIBRIN DEGRADATION QUANT: CPT

## 2020-09-17 PROCEDURE — 71045 X-RAY EXAM CHEST 1 VIEW: CPT

## 2020-09-17 PROCEDURE — 99283 EMERGENCY DEPT VISIT LOW MDM: CPT

## 2020-09-17 PROCEDURE — 80053 COMPREHEN METABOLIC PANEL: CPT

## 2020-09-17 PROCEDURE — 84484 ASSAY OF TROPONIN QUANT: CPT

## 2020-09-17 PROCEDURE — 99284 EMERGENCY DEPT VISIT MOD MDM: CPT

## 2020-09-17 PROCEDURE — 36415 COLL VENOUS BLD VENIPUNCTURE: CPT

## 2020-09-17 PROCEDURE — 85025 COMPLETE CBC W/AUTO DIFF WBC: CPT

## 2020-09-17 PROCEDURE — 93005 ELECTROCARDIOGRAM TRACING: CPT | Performed by: EMERGENCY MEDICINE

## 2020-09-17 PROCEDURE — 99999 PR OFFICE/OUTPT VISIT,PROCEDURE ONLY: CPT | Performed by: EMERGENCY MEDICINE

## 2020-09-17 ASSESSMENT — PAIN SCALES - GENERAL: PAINLEVEL_OUTOF10: 10

## 2020-09-17 NOTE — ED NOTES
Bed: 08  Expected date:   Expected time:   Means of arrival:   Comments:  Terminal Arcenio Mckay, DIANE  09/17/20 9598

## 2020-09-17 NOTE — ED PROVIDER NOTES
[facility]  eMERGENCY dEPARTMENT eNCOUnter      Pt Name: Henrique Tomlin  MRN: [id number]  Birthdate 1980  Date of evaluation: 9/17/2020  Provider: Venkata Roberts MD    CHIEF COMPLAINT       Chief Complaint   Patient presents with    Chest Pain         HISTORY OF PRESENT ILLNESS   (Location/Symptom, Timing/Onset,Context/Setting, Quality, Duration, Modifying Factors, Severity)  Note limiting factors. Henrique Tomlin is a 36 y.o. male who presents to the emergency department for evaluation of chest pain. Patient describes this pain is fairly sharp in nature and without radiation. States is located over his anterior chest.  He does not associate any feelings of shortness of breath, fevers or chills. Reports that this been ongoing intermittently for the past couple of days. States it feels a little worse when he takes a deep breath. No prior history of pulmonary embolism or DVT. He has not had any recent illnesses. Symptoms are described as mild in severity and without relieving factors. HPI    NursingNotes were reviewed. REVIEW OF SYSTEMS    (2-9 systems for level 4, 10 or more for level 5)     Review of Systems   Constitutional: Negative for chills and fever. Respiratory: Negative for shortness of breath. Cardiovascular: Positive for chest pain. Negative for palpitations and leg swelling. Gastrointestinal: Negative for abdominal distention, abdominal pain, diarrhea, nausea and vomiting. Neurological: Negative for syncope. All other systems reviewed and are negative. PAST MEDICALHISTORY     Past Medical History:   Diagnosis Date    Back pain     Chest pain     Scoliosis     Tobacco abuse     Vitamin D deficiency          SURGICAL HISTORY       Past Surgical History:   Procedure Laterality Date    BACK SURGERY      x 3          CURRENT MEDICATIONS     Previous Medications    No medications on file       ALLERGIES     Flexeril [cyclobenzaprine];  Toradol [ketorolac tromethamine];  Demerol hcl [meperidine]; and Other    FAMILY HISTORY       Family History   Problem Relation Age of Onset    Heart Disease Mother     Hypertension Mother     Cancer Father         prostate          SOCIAL HISTORY       Social History     Socioeconomic History    Marital status: Single     Spouse name: None    Number of children: None    Years of education: None    Highest education level: None   Occupational History    None   Social Needs    Financial resource strain: None    Food insecurity     Worry: None     Inability: None    Transportation needs     Medical: None     Non-medical: None   Tobacco Use    Smoking status: Current Every Day Smoker     Packs/day: 0.50    Smokeless tobacco: Never Used   Substance and Sexual Activity    Alcohol use: No    Drug use: No    Sexual activity: None   Lifestyle    Physical activity     Days per week: None     Minutes per session: None    Stress: None   Relationships    Social connections     Talks on phone: None     Gets together: None     Attends Christian service: None     Active member of club or organization: None     Attends meetings of clubs or organizations: None     Relationship status: None    Intimate partner violence     Fear of current or ex partner: None     Emotionally abused: None     Physically abused: None     Forced sexual activity: None   Other Topics Concern    None   Social History Narrative    None       SCREENINGS    Lazaro Coma Scale  Eye Opening: Spontaneous  Best Verbal Response: Oriented  Best Motor Response: Obeys commands  Lazaro Coma Scale Score: 15        PHYSICAL EXAM    (up to 7 for level 4, 8 or more for level 5)     ED Triage Vitals   BP Temp Temp src Pulse Resp SpO2 Height Weight   09/17/20 1216 09/17/20 1215 -- 09/17/20 1216 09/17/20 1215 09/17/20 1215 09/17/20 1215 09/17/20 1215   (!) 162/98 97.3 °F (36.3 °C)  103 18 96 % 5' 6\" (1.676 m) 215 lb (97.5 kg)       Physical Exam  Vitals signs and nursing note reviewed. HENT:      Head: Atraumatic. Mouth/Throat:      Mouth: Mucous membranes are moist. Mucous membranes are not dry. Pharynx: No posterior oropharyngeal erythema. Eyes:      General: No scleral icterus. Pupils: Pupils are equal, round, and reactive to light. Neck:      Trachea: No tracheal deviation. Cardiovascular:      Rate and Rhythm: Normal rate and regular rhythm. Heart sounds: Normal heart sounds. No murmur. Pulmonary:      Effort: Pulmonary effort is normal. No respiratory distress. Breath sounds: Normal breath sounds. No stridor. Chest:      Chest wall: No tenderness. Abdominal:      General: There is no distension. Palpations: Abdomen is soft. Tenderness: There is no abdominal tenderness. There is no guarding. Musculoskeletal:      Right lower leg: No edema. Left lower leg: No edema. Skin:     Capillary Refill: Capillary refill takes less than 2 seconds. Coloration: Skin is not pale. Findings: No rash. Neurological:      Mental Status: He is alert and oriented to person, place, and time. Psychiatric:         Mood and Affect: Mood normal.         Behavior: Behavior is cooperative. DIAGNOSTIC RESULTS     EKG: All EKG's areinterpreted by the Emergency Department Physician who either signs or Co-signs this chart in the absence of a cardiologist.    [phone]: NSR @ 91, no ST changes    RADIOLOGY:  Non-plain film images such as CT, Ultrasound and MRI are read by the radiologist. Plain radiographic images are visualized and preliminarily interpreted bythe emergency physician with the below findings:        XR CHEST PORTABLE   Final Result   1. No radiographic evidence of acute cardiopulmonary process.    Signed by Dr Prisca Clark on 9/17/2020 1:15 PM              LABS:  Labs Reviewed   COMPREHENSIVE METABOLIC PANEL - Abnormal; Notable for the following components:       Result Value    Glucose 124 (*)     Total Protein 6.4 (*) All other components within normal limits   CBC WITH AUTO DIFFERENTIAL - Abnormal; Notable for the following components:    MCV 96.9 (*)     MCH 32.6 (*)     Neutrophils % 49.8 (*)     Basophils % 1.1 (*)     All other components within normal limits   D-DIMER, QUANTITATIVE   TROPONIN   TROPONIN       All other labs were within normal range or not returned as of this dictation. EMERGENCY DEPARTMENT COURSE and DIFFERENTIAL DIAGNOSIS/MDM:   Vitals:    Vitals:    09/17/20 1415 09/17/20 1445 09/17/20 1500 09/17/20 1530   BP:  (!) 148/98 (!) 144/95 (!) 166/99   Pulse: 74 100 81 89   Resp: 14 20 15 20   Temp:       SpO2: 95% 95% 95% 97%   Weight:       Height:           MDM    Reassessment    Laboratory studies unremarkable. Chest radiograph does not reveal evidence of pneumonia or pneumothorax. Vital signs are stable. Cardiac biomarker negative. PROCEDURES:  Unless otherwise noted below, none     Procedures    FINAL IMPRESSION      1.  Atypical chest pain          DISPOSITION/PLAN   DISPOSITION Decision To Discharge 09/17/2020 04:41:20 PM      PATIENT REFERRED TO:  Phylis Klinefelter, MD  [address] [place] [address]   [id number]  [place] [phone]            DISCHARGE MEDICATIONS:  New Prescriptions    No medications on file          (Please note that portions of this note were completed with a voice recognition program.  Efforts were made to edit thedictations but occasionally words are mis-transcribed.)    Car Yanes MD (electronically signed)  Attending Emergency Physician         Car Yanes MD  10/04/20 0350

## 2020-09-18 ENCOUNTER — OFFICE VISIT (OUTPATIENT)
Age: 40
End: 2020-09-18

## 2020-09-18 VITALS — OXYGEN SATURATION: 98 % | TEMPERATURE: 97.5 F | HEART RATE: 105 BPM

## 2020-09-18 LAB
EKG P AXIS: 58 DEGREES
EKG P-R INTERVAL: 114 MS
EKG Q-T INTERVAL: 388 MS
EKG QRS DURATION: 82 MS
EKG QTC CALCULATION (BAZETT): 409 MS
EKG T AXIS: 48 DEGREES

## 2020-09-18 PROCEDURE — 99999 PR OFFICE/OUTPT VISIT,PROCEDURE ONLY: CPT | Performed by: NURSE PRACTITIONER

## 2020-09-18 PROCEDURE — 93010 ELECTROCARDIOGRAM REPORT: CPT | Performed by: INTERNAL MEDICINE

## 2020-09-21 LAB — SARS-COV-2, NAA: NOT DETECTED

## 2020-10-04 ASSESSMENT — ENCOUNTER SYMPTOMS
ABDOMINAL PAIN: 0
ABDOMINAL DISTENTION: 0
VOMITING: 0
DIARRHEA: 0
SHORTNESS OF BREATH: 0
NAUSEA: 0

## 2020-10-21 ENCOUNTER — HOSPITAL ENCOUNTER (EMERGENCY)
Age: 40
Discharge: HOME | End: 2020-10-21
Attending: EMERGENCY MEDICINE
Payer: MEDICARE

## 2020-10-21 VITALS
HEART RATE: 76 BPM | HEIGHT: 66 IN | BODY MASS INDEX: 34.55 KG/M2 | RESPIRATION RATE: 18 BRPM | WEIGHT: 215 LBS | DIASTOLIC BLOOD PRESSURE: 88 MMHG | SYSTOLIC BLOOD PRESSURE: 143 MMHG | OXYGEN SATURATION: 98 % | TEMPERATURE: 98 F

## 2020-10-21 PROCEDURE — 6360000002 HC RX W HCPCS: Performed by: EMERGENCY MEDICINE

## 2020-10-21 PROCEDURE — 96372 THER/PROPH/DIAG INJ SC/IM: CPT

## 2020-10-21 PROCEDURE — 99284 EMERGENCY DEPT VISIT MOD MDM: CPT

## 2020-10-21 RX ORDER — ORPHENADRINE CITRATE 30 MG/ML
60 INJECTION INTRAMUSCULAR; INTRAVENOUS ONCE
Status: COMPLETED | OUTPATIENT
Start: 2020-10-21 | End: 2020-10-21

## 2020-10-21 RX ORDER — MORPHINE SULFATE 4 MG/ML
4 INJECTION, SOLUTION INTRAMUSCULAR; INTRAVENOUS ONCE
Status: COMPLETED | OUTPATIENT
Start: 2020-10-21 | End: 2020-10-21

## 2020-10-21 RX ADMIN — MORPHINE SULFATE 4 MG: 4 INJECTION, SOLUTION INTRAMUSCULAR; INTRAVENOUS at 03:42

## 2020-10-21 RX ADMIN — ORPHENADRINE CITRATE 60 MG: 30 INJECTION INTRAMUSCULAR; INTRAVENOUS at 03:42

## 2020-10-21 ASSESSMENT — PAIN SCALES - GENERAL
PAINLEVEL_OUTOF10: 10
PAINLEVEL_OUTOF10: 10

## 2020-10-21 ASSESSMENT — PAIN DESCRIPTION - LOCATION: LOCATION: BACK

## 2020-10-21 NOTE — ED NOTES
Pt states that he tried a hot shower and ibuprofen at home for pain PTA     Shobha Gonzalez, RN  10/21/20 6661

## 2020-10-22 ENCOUNTER — APPOINTMENT (OUTPATIENT)
Dept: GENERAL RADIOLOGY | Facility: HOSPITAL | Age: 40
End: 2020-10-22

## 2020-10-22 ENCOUNTER — HOSPITAL ENCOUNTER (EMERGENCY)
Facility: HOSPITAL | Age: 40
Discharge: HOME | End: 2020-10-22
Attending: EMERGENCY MEDICINE | Admitting: EMERGENCY MEDICINE

## 2020-10-22 VITALS
RESPIRATION RATE: 16 BRPM | HEIGHT: 66 IN | HEART RATE: 85 BPM | WEIGHT: 220 LBS | TEMPERATURE: 97.5 F | OXYGEN SATURATION: 97 % | DIASTOLIC BLOOD PRESSURE: 85 MMHG | SYSTOLIC BLOOD PRESSURE: 138 MMHG | BODY MASS INDEX: 35.36 KG/M2

## 2020-10-22 DIAGNOSIS — S70.00XA CONTUSION OF HIP, UNSPECIFIED LATERALITY, INITIAL ENCOUNTER: Primary | ICD-10-CM

## 2020-10-22 DIAGNOSIS — S33.5XXA SPRAIN OF LOW BACK, INITIAL ENCOUNTER: ICD-10-CM

## 2020-10-22 PROCEDURE — 25010000002 ORPHENADRINE CITRATE PER 60 MG: Performed by: EMERGENCY MEDICINE

## 2020-10-22 PROCEDURE — 25010000002 MORPHINE PER 10 MG: Performed by: EMERGENCY MEDICINE

## 2020-10-22 PROCEDURE — 72110 X-RAY EXAM L-2 SPINE 4/>VWS: CPT

## 2020-10-22 PROCEDURE — 73502 X-RAY EXAM HIP UNI 2-3 VIEWS: CPT

## 2020-10-22 PROCEDURE — 96372 THER/PROPH/DIAG INJ SC/IM: CPT

## 2020-10-22 PROCEDURE — 99283 EMERGENCY DEPT VISIT LOW MDM: CPT

## 2020-10-22 RX ORDER — ORPHENADRINE CITRATE 30 MG/ML
60 INJECTION INTRAMUSCULAR; INTRAVENOUS ONCE
Status: DISCONTINUED | OUTPATIENT
Start: 2020-10-22 | End: 2020-10-22

## 2020-10-22 RX ORDER — ORPHENADRINE CITRATE 30 MG/ML
60 INJECTION INTRAMUSCULAR; INTRAVENOUS ONCE
Status: COMPLETED | OUTPATIENT
Start: 2020-10-22 | End: 2020-10-22

## 2020-10-22 RX ORDER — MORPHINE SULFATE 10 MG/ML
8 INJECTION INTRAMUSCULAR; INTRAVENOUS; SUBCUTANEOUS ONCE
Status: COMPLETED | OUTPATIENT
Start: 2020-10-22 | End: 2020-10-22

## 2020-10-22 RX ORDER — OXYCODONE HYDROCHLORIDE AND ACETAMINOPHEN 5; 325 MG/1; MG/1
1 TABLET ORAL EVERY 6 HOURS PRN
Qty: 8 TABLET | Refills: 0 | OUTPATIENT
Start: 2020-10-22 | End: 2021-02-09

## 2020-10-22 RX ORDER — MORPHINE SULFATE 10 MG/ML
8 INJECTION INTRAMUSCULAR; INTRAVENOUS; SUBCUTANEOUS ONCE
Status: DISCONTINUED | OUTPATIENT
Start: 2020-10-22 | End: 2020-10-22

## 2020-10-22 RX ADMIN — MORPHINE SULFATE 8 MG: 10 INJECTION, SOLUTION INTRAMUSCULAR; INTRAVENOUS at 02:25

## 2020-10-22 RX ADMIN — ORPHENADRINE CITRATE 60 MG: 30 INJECTION INTRAMUSCULAR; INTRAVENOUS at 02:25

## 2020-10-22 NOTE — ED PROVIDER NOTES
Subjective   39 y/o male with chronic back pain arrives for evaluation of low back and hip pain after a mechincal fall in the shower just PTA stating he slipped. He tells me he was told he would need hip replacement surgery in the past but has not followed up as of yet. He denies weakness, numbness or tingling, loss of bowel or bladder control or saddle anesthesia. Denies head trauma or LOC or anticoagulation usage.  He arrives in [facility].         Family, social and past history reviewed as below, prior documentation of H and Ps and other documentation are reviewed:    Past Medical History:  No date: Back pain  No date: Hypertension  No date: Neuropathy    Past Surgical History:  No date: BACK SURGERY    Social History    Socioeconomic History      Marital status:       Spouse name: Not on file      Number of children: Not on file      Years of education: Not on file      Highest education level: Not on file    Tobacco Use      Smoking status: Current Every Day Smoker        Packs/day: 0.50        Types: Cigarettes    Substance and Sexual Activity      Alcohol use: No      Drug use: No      Sexual activity: Defer      Family history: reviewed and noncontributory           Review of Systems   All other systems reviewed and are negative.      Past Medical History:   Diagnosis Date   • Back pain    • Hypertension    • Neuropathy        Allergies   Allergen Reactions   • Benadryl [Diphenhydramine] Other (See Comments)     Restless leg   • Darvon [Propoxyphene]    • Dilaudid [Hydromorphone Hcl]    • Flexeril [Cyclobenzaprine]    • Naproxen    • Nubain [Nalbuphine Hcl]    • Toradol [Ketorolac Tromethamine]    • Zanaflex [Tizanidine Hcl] Other (See Comments)     Restless Leg        Past Surgical History:   Procedure Laterality Date   • BACK SURGERY         History reviewed. No pertinent family history.    Social History     Socioeconomic History   • Marital status:      Spouse name: Not on file   • Number of  children: Not on file   • Years of education: Not on file   • Highest education level: Not on file   Tobacco Use   • Smoking status: Current Every Day Smoker     Packs/day: 0.50     Types: Cigarettes   Substance and Sexual Activity   • Alcohol use: No   • Drug use: No   • Sexual activity: Defer           Objective   Physical Exam  Vitals signs and nursing note reviewed.   Constitutional:       Appearance: Normal appearance.   HENT:      Head: Normocephalic.      Nose: Nose normal.      Mouth/Throat:      Mouth: Mucous membranes are moist.   Eyes:      Extraocular Movements: Extraocular movements intact.      Pupils: Pupils are equal, round, and reactive to light.   Musculoskeletal: Normal range of motion.         General: Tenderness present. No swelling, deformity or signs of injury.      Right hip: He exhibits tenderness. He exhibits normal range of motion, normal strength, no bony tenderness, no swelling, no crepitus, no deformity and no laceration.      Left hip: Normal.      Right knee: Normal.      Right ankle: Normal.      Lumbar back: He exhibits tenderness, pain and spasm. He exhibits normal range of motion, no bony tenderness, no swelling, no edema, no deformity, no laceration and normal pulse.        Back:       Comments: No shortening, no rotation, distally pulses are intact, sensation is intact        To the back no midline tenderenss, no step offs, no deformities.    Skin:     General: Skin is warm and dry.      Capillary Refill: Capillary refill takes less than 2 seconds.   Neurological:      General: No focal deficit present.      Mental Status: He is alert and oriented to person, place, and time.   Psychiatric:         Mood and Affect: Mood normal.         Thought Content: Thought content normal.         Procedures           ED Course      XR Spine Lumbar Complete 4+VW   ED Interpretation   No obvious fractures       XR Hip With or Without Pelvis 2 - 3 View Right   ED Interpretation   No acute  process.         Labs Reviewed - No data to display                                       MDM    Final diagnoses:   Contusion of hip, unspecified laterality, initial encounter   Sprain of low back, initial encounter            Earl Rene MD  10/22/20 0157

## 2020-11-17 ENCOUNTER — APPOINTMENT (OUTPATIENT)
Dept: CT IMAGING | Facility: HOSPITAL | Age: 40
End: 2020-11-17

## 2020-11-17 ENCOUNTER — HOSPITAL ENCOUNTER (EMERGENCY)
Facility: HOSPITAL | Age: 40
Discharge: HOME | End: 2020-11-17
Attending: EMERGENCY MEDICINE | Admitting: EMERGENCY MEDICINE

## 2020-11-17 ENCOUNTER — APPOINTMENT (OUTPATIENT)
Dept: GENERAL RADIOLOGY | Facility: HOSPITAL | Age: 40
End: 2020-11-17

## 2020-11-17 VITALS
SYSTOLIC BLOOD PRESSURE: 111 MMHG | WEIGHT: 216 LBS | TEMPERATURE: 98.3 F | OXYGEN SATURATION: 100 % | HEIGHT: 66 IN | DIASTOLIC BLOOD PRESSURE: 62 MMHG | BODY MASS INDEX: 34.72 KG/M2 | RESPIRATION RATE: 16 BRPM | HEART RATE: 72 BPM

## 2020-11-17 DIAGNOSIS — S39.012A LOW BACK STRAIN, INITIAL ENCOUNTER: ICD-10-CM

## 2020-11-17 DIAGNOSIS — S70.01XA CONTUSION OF RIGHT HIP, INITIAL ENCOUNTER: ICD-10-CM
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
## 2020-11-17 PROCEDURE — 25010000002 MORPHINE PER 10 MG: Performed by: EMERGENCY MEDICINE

## 2020-11-17 PROCEDURE — 99282 EMERGENCY DEPT VISIT SF MDM: CPT

## 2020-11-17 PROCEDURE — 96372 THER/PROPH/DIAG INJ SC/IM: CPT

## 2020-11-17 PROCEDURE — 72128 CT CHEST SPINE W/O DYE: CPT

## 2020-11-17 PROCEDURE — 73502 X-RAY EXAM HIP UNI 2-3 VIEWS: CPT

## 2020-11-17 PROCEDURE — 72131 CT LUMBAR SPINE W/O DYE: CPT

## 2020-11-17 RX ADMIN — MORPHINE SULFATE 4 MG: 4 INJECTION, SOLUTION INTRAMUSCULAR; INTRAVENOUS at 16:32

## 2020-11-17 NOTE — ED PROVIDER NOTES
Subjective   Patient is a 40-year-old male who presents to the ER status post a fall.  Patient states he slipped in his bathtub today and fell landing on his right hip.  Patient did not hit his head or have any loss of consciousness.  Patient complains of mid and low back pain as well as right hip pain since the fall.  Patient states he has had a history of multiple back surgeries in the past.  He denies any fever, chest pain, shortness of air, abdominal pain, nausea vomiting diarrhea, urinary changes, neurologic changes, neck pain, extremity pain.          Review of Systems   Constitutional: Negative.    HENT: Negative.    Eyes: Negative.    Respiratory: Negative.    Cardiovascular: Negative.    Gastrointestinal: Negative.    Endocrine: Negative.    Genitourinary: Negative.    Musculoskeletal: Positive for arthralgias, back pain and myalgias.   Skin: Negative.    Allergic/Immunologic: Negative.    Neurological: Negative.    Hematological: Negative.    Psychiatric/Behavioral: Negative.    All other systems reviewed and are negative.      Past Medical History:   Diagnosis Date   • Back pain    • Hypertension    • Neuropathy        Allergies   Allergen Reactions   • Benadryl [Diphenhydramine] Other (See Comments)     Restless leg   • Darvon [Propoxyphene]    • Dilaudid [Hydromorphone Hcl]    • Flexeril [Cyclobenzaprine]    • Naproxen    • Nubain [Nalbuphine Hcl]    • Toradol [Ketorolac Tromethamine]    • Zanaflex [Tizanidine Hcl] Other (See Comments)     Restless Leg        Past Surgical History:   Procedure Laterality Date   • BACK SURGERY         No family history on file.    Social History     Socioeconomic History   • Marital status:      Spouse name: Not on file   • Number of children: Not on file   • Years of education: Not on file   • Highest education level: Not on file   Tobacco Use   • Smoking status: Current Every Day Smoker     Packs/day: 0.50     Types: Cigarettes   Substance and Sexual Activity    • Alcohol use: No   • Drug use: No   • Sexual activity: Defer           Objective   Physical Exam  Vitals signs and nursing note reviewed.   Constitutional:       Appearance: He is well-developed.   HENT:      Head: Normocephalic and atraumatic.   Eyes:      Conjunctiva/sclera: Conjunctivae normal.      Pupils: Pupils are equal, round, and reactive to light.   Neck:      Musculoskeletal: Normal range of motion.   Cardiovascular:      Rate and Rhythm: Normal rate and regular rhythm.      Heart sounds: Normal heart sounds.   Pulmonary:      Effort: Pulmonary effort is normal.      Breath sounds: Normal breath sounds.   Abdominal:      Palpations: Abdomen is soft.      Tenderness: There is no abdominal tenderness.   Musculoskeletal: Normal range of motion.         General: No deformity.      Comments: Tender to palpation lower T and entire L-spine as well as right posterior hip, nontender palpation elsewhere including C-spine, normal range of motion, neurovascularly intact   Skin:     General: Skin is warm.   Neurological:      Mental Status: He is alert and oriented to person, place, and time.      Cranial Nerves: Cranial nerves are intact.      Sensory: Sensation is intact.      Motor: Motor function is intact.      Coordination: Coordination is intact.   Psychiatric:         Behavior: Behavior normal.         Procedures           ED Course      Patient was given morphine.  Improving.      CT Thoracic Spine Without Contrast   Final Result   . 1. No evidence of acute fracture. There is known spinal   dysraphism at T11. There is a mildly accentuated thoracic kyphosis.   2. Moderate left-sided foraminal narrowing at T11-T12 related to mild   retrolisthesis of T11 in its relation to T12 and facet overgrowth.   Postoperative changes are noted at the T12-L2 level not imaged in their   entirety.       This report was finalized on 11/17/2020 17:57 by Dr. Andriy Patel MD.      CT Lumbar Spine Without Contrast   Final  Result   Impression:    1. Extensive postoperative changes throughout the lower thoracic and   lumbar spine to the S1 level with interbody grafts, lateral fusion and   pedicle screws with interlocking bars. I do not see evidence of hardware   failure or loosening. No acute fracture lines are appreciated.           This report was finalized on 11/17/2020 18:09 by Dr. Andriy Patel MD.      XR Hip With or Without Pelvis 2 - 3 View Right   Final Result   Impression:       No acute osseous pathology.       This report was finalized on 11/17/2020 16:36 by Dr. Phylicia Kauffman MD.        Imaging showed no acute fractures.  Patient was feeling better.  Patient was advised rest ice and NSAIDs.  Patient will be discharged home to follow-up with his PCP.  Return for any worsening pain or other concerns.  Patient agreeable.                                     MDM    Final diagnoses:   Fall, initial encounter   Contusion of right hip, initial encounter   Low back strain, initial encounter            Deepa Clarke MD  11/17/20 [id number]

## 2020-11-19 ENCOUNTER — HOSPITAL ENCOUNTER (EMERGENCY)
Age: 40
Discharge: HOME | End: 2020-11-19
Payer: MEDICARE

## 2020-11-19 VITALS
OXYGEN SATURATION: 98 % | RESPIRATION RATE: 16 BRPM | HEART RATE: 80 BPM | DIASTOLIC BLOOD PRESSURE: 77 MMHG | TEMPERATURE: 98.9 F | SYSTOLIC BLOOD PRESSURE: 115 MMHG

## 2020-11-19 PROCEDURE — 96372 THER/PROPH/DIAG INJ SC/IM: CPT

## 2020-11-19 PROCEDURE — 99282 EMERGENCY DEPT VISIT SF MDM: CPT

## 2020-11-19 PROCEDURE — 6360000002 HC RX W HCPCS: Performed by: NURSE PRACTITIONER

## 2020-11-19 RX ORDER — LISINOPRIL 10 MG/1
10 TABLET ORAL DAILY
COMMUNITY

## 2020-11-19 RX ORDER — MORPHINE SULFATE 4 MG/ML
4 INJECTION, SOLUTION INTRAMUSCULAR; INTRAVENOUS ONCE
Status: COMPLETED | OUTPATIENT
Start: 2020-11-19 | End: 2020-11-19

## 2020-11-19 RX ORDER — ORPHENADRINE CITRATE 30 MG/ML
60 INJECTION INTRAMUSCULAR; INTRAVENOUS ONCE
Status: COMPLETED | OUTPATIENT
Start: 2020-11-19 | End: 2020-11-19

## 2020-11-19 RX ADMIN — ORPHENADRINE CITRATE 60 MG: 30 INJECTION INTRAMUSCULAR; INTRAVENOUS at 16:49

## 2020-11-19 RX ADMIN — MORPHINE SULFATE 4 MG: 4 INJECTION, SOLUTION INTRAMUSCULAR; INTRAVENOUS at 16:49

## 2020-11-19 ASSESSMENT — PAIN SCALES - GENERAL
PAINLEVEL_OUTOF10: 8
PAINLEVEL_OUTOF10: 8

## 2020-11-19 ASSESSMENT — ENCOUNTER SYMPTOMS
BACK PAIN: 1
ABDOMINAL PAIN: 0

## 2020-11-19 NOTE — ED PROVIDER NOTES
[facility] EMERGENCY DEPT  eMERGENCY dEPARTMENT eNCOUnter      Pt Name: Ross Watt  MRN: [id number]  [place] 1980  Date of evaluation: 11/19/2020  Provider: TOYA Arevalo    CHIEF COMPLAINT       Chief Complaint   Patient presents with    Back Pain     chronic         HISTORY OF PRESENT ILLNESS   (Location/Symptom, Timing/Onset,Context/Setting, Quality, Duration, Modifying Factors, Severity)  Note limiting factors. Nely Peralta a 36 y.o. male who presents to the emergency department for evaluation of back pain. Pt tells me that he has chronic low back pain with increased low back pain from baseline tonight after moving furniture. He denies fall as well as any fevers, abdominal pain, saddle anesthesia or lower extremity numbness/weakness. [facility]    Nursing Notes were reviewed. REVIEW OF SYSTEMS    (2-9 systems for level 4, 10 or more for level 5)     Review of Systems   Constitutional: Negative. Gastrointestinal: Negative for abdominal pain. Musculoskeletal: Positive for back pain. Neurological: Negative for weakness and numbness. A complete review of systems was performed and is negative except as noted above in the HPI. PAST MEDICAL HISTORY     Past Medical History:   Diagnosis Date    Back pain     Chest pain     Scoliosis     Tobacco abuse     Vitamin D deficiency          SURGICAL HISTORY       Past Surgical History:   Procedure Laterality Date    BACK SURGERY      x 3          CURRENT MEDICATIONS       Previous Medications    LISINOPRIL (PRINIVIL;ZESTRIL) 10 MG TABLET    Take 10 mg by mouth daily       ALLERGIES     Flexeril [cyclobenzaprine]; Toradol [ketorolac tromethamine];  Demerol hcl [meperidine]; and Other    FAMILY HISTORY       Family History   Problem Relation Age of Onset    Heart Disease Mother     Hypertension Mother     Cancer Father         prostate          SOCIAL HISTORY       Social History     Socioeconomic History    Marital status: Single     Spouse name: Not on file    Number of children: Not on file    Years of education: Not on file    Highest education level: Not on file   Occupational History    Not on file   Social Needs    Financial resource strain: Not on file    Food insecurity     Worry: Not on file     Inability: Not on file    Transportation needs     Medical: Not on file     Non-medical: Not on file   Tobacco Use    Smoking status: Current Every Day Smoker     Packs/day: 0.50    Smokeless tobacco: Never Used   Substance and Sexual Activity    Alcohol use: No    Drug use: No    Sexual activity: Not on file   Lifestyle    Physical activity     Days per week: Not on file     Minutes per session: Not on file    Stress: Not on file   Relationships    Social connections     Talks on phone: Not on file     Gets together: Not on file     Attends Religion service: Not on file     Active member of club or organization: Not on file     Attends meetings of clubs or organizations: Not on file     Relationship status: Not on file    Intimate partner violence     Fear of current or ex partner: Not on file     Emotionally abused: Not on file     Physically abused: Not on file     Forced sexual activity: Not on file   Other Topics Concern    Not on file   Social History Narrative    Not on file       SCREENINGS             PHYSICAL EXAM    (up to 7 for level 4, 8 or more for level 5)     ED Triage Vitals [11/19/20 1512]   BP Temp Temp Source Pulse Resp SpO2 Height Weight   120/76 98.9 °F (37.2 °C) Temporal 88 20 98 % -- --       Physical Exam  Vitals signs reviewed. Cardiovascular:      Rate and Rhythm: Normal rate. Pulmonary:      Effort: Pulmonary effort is normal.   Abdominal:      General: Abdomen is flat. Tenderness: There is no abdominal tenderness. Musculoskeletal:      Comments: 5/5 MS equal bilateral lower extremities  No direct ttp lumbar spine   Skin:     General: Skin is warm and dry.    Neurological:      Mental Status: He is oriented to person, place, and time. DIAGNOSTIC RESULTS     EKG: All EKG's are interpreted by the Emergency Department Physician who either signs or Co-signs this chart in the absence of acardiologist.        RADIOLOGY:   Non-plain film images such as CT, Ultrasound andMRI are read by the radiologist. Plain radiographic images are visualized and preliminarily interpreted by the emergency physician with the below findings:        Interpretation per the Radiologist below, if available at the time of this note:    No orders to display         ED BEDSIDE ULTRASOUND:   Performed by ED Physician - none    LABS:  Labs Reviewed - No data to display    All other labs were within normal range or not returned as of this dictation. RE-ASSESSMENT           EMERGENCY DEPARTMENT COURSE and DIFFERENTIALDIAGNOSIS/MDM:   Vitals:    Vitals:    11/19/20 1512   BP: 120/76   Pulse: 88   Resp: 20   Temp: 98.9 °F (37.2 °C)   TempSrc: Temporal   SpO2: 98%       MDM      CONSULTS:  None    PROCEDURES:  Unless otherwise notedbelow, none     Procedures    FINAL IMPRESSION     1. Acute exacerbation of chronic low back pain          DISPOSITION/PLAN   DISPOSITION Discharge - Pending Orders Complete 11/19/2020 04:43:49 PM      PATIENT REFERRED TO:  [address]   [place] [id number] [phone]  Schedule an appointment as soon as possible for a visit   As needed      DISCHARGE MEDICATIONS:       Current Discharge Medication List          (Pleasenote that portions of this note were completed with a voice recognition program.  Efforts were made to edit the dictations but occasionally words are mis-transcribed.)              TOYA Dumont  11/19/20 4941

## 2020-11-19 NOTE — ED NOTES
Patient has history of chronic lower back pain. Patient able to walk. No numbness per patient.  Patient in pain     Edwina Boswell RN  11/19/20 3845

## 2020-11-21 ENCOUNTER — HOSPITAL ENCOUNTER (EMERGENCY)
Facility: HOSPITAL | Age: 40
Discharge: HOME | End: 2020-11-21
Attending: EMERGENCY MEDICINE | Admitting: EMERGENCY MEDICINE

## 2020-11-21 VITALS
OXYGEN SATURATION: 100 % | HEIGHT: 66 IN | WEIGHT: 220 LBS | HEART RATE: 104 BPM | DIASTOLIC BLOOD PRESSURE: 97 MMHG | BODY MASS INDEX: 35.36 KG/M2 | TEMPERATURE: 98.5 F | SYSTOLIC BLOOD PRESSURE: 161 MMHG | RESPIRATION RATE: 16 BRPM

## 2020-11-21 DIAGNOSIS — K08.89 PAIN, DENTAL: Primary | ICD-10-CM

## 2020-11-21 PROCEDURE — 99283 EMERGENCY DEPT VISIT LOW MDM: CPT

## 2020-11-21 PROCEDURE — 96372 THER/PROPH/DIAG INJ SC/IM: CPT

## 2020-11-21 PROCEDURE — 25010000002 KETOROLAC TROMETHAMINE PER 15 MG: Performed by: EMERGENCY MEDICINE

## 2020-11-21 RX ORDER — CHLORHEXIDINE GLUCONATE 0.12 MG/ML
15 RINSE ORAL 4 TIMES DAILY
Qty: 420 ML | Refills: 0 | Status: SHIPPED | OUTPATIENT
Start: 2020-11-21 | End: 2020-11-28

## 2020-11-21 RX ORDER — KETOROLAC TROMETHAMINE 30 MG/ML
30 INJECTION, SOLUTION INTRAMUSCULAR; INTRAVENOUS ONCE
Status: COMPLETED | OUTPATIENT
Start: 2020-11-21 | End: 2020-11-21

## 2020-11-21 RX ORDER — CLINDAMYCIN HYDROCHLORIDE 300 MG/1
300 CAPSULE ORAL EVERY 6 HOURS
Qty: 28 CAPSULE | Refills: 0 | Status: SHIPPED | OUTPATIENT
Start: 2020-11-21 | End: 2020-11-28

## 2020-11-21 RX ADMIN — KETOROLAC TROMETHAMINE 30 MG: 30 INJECTION, SOLUTION INTRAMUSCULAR at 15:21

## 2020-11-21 NOTE — DISCHARGE INSTRUCTIONS
Please return immediately to the emergency department for any new or worsening symptoms. Please see a dentist in 1-2 days for re-evaluation of your symptoms.

## 2020-11-21 NOTE — ED PROVIDER NOTES
Subjective   This is a 40-year-old gentleman who denies any past medical history presents emergency department a right dental pain.  He complains of right upper dental pain that is sharp, constant, moderate, no exacerbating relieving factors.  It started today.  He denies any headache, vision changes, voice changes, trouble swallowing, chest pain, shortness of breath, fevers, chills, nausea, or vomiting.  States she has had pain like this before and usually sees a dentist for it.  States he plans to see a dentist this week.    Past medical history: Denies  Social history: Smokes tobacco      History provided by:  Patient      Review of Systems   All other systems reviewed and are negative.      Past Medical History:   Diagnosis Date   • Back pain    • Hypertension    • Neuropathy        Allergies   Allergen Reactions   • Benadryl [Diphenhydramine] Other (See Comments)     Restless leg   • Darvon [Propoxyphene]    • Dilaudid [Hydromorphone Hcl]    • Flexeril [Cyclobenzaprine]    • Naproxen    • Nubain [Nalbuphine Hcl]    • Toradol [Ketorolac Tromethamine]    • Zanaflex [Tizanidine Hcl] Other (See Comments)     Restless Leg        Past Surgical History:   Procedure Laterality Date   • BACK SURGERY         History reviewed. No pertinent family history.    Social History     Socioeconomic History   • Marital status:      Spouse name: Not on file   • Number of children: Not on file   • Years of education: Not on file   • Highest education level: Not on file   Tobacco Use   • Smoking status: Current Every Day Smoker     Packs/day: 0.50     Types: Cigarettes   Substance and Sexual Activity   • Alcohol use: No   • Drug use: No   • Sexual activity: Defer           Objective   Physical Exam  Constitutional:       Appearance: Normal appearance.   HENT:      Head: Normocephalic and atraumatic.      Nose: Nose normal.      Mouth/Throat:      Mouth: Mucous membranes are moist.      Comments: Poor dentition throughout.   Patient is pointing to his right upper jaw when he complains of pain.  Multiple broken teeth.  But no swelling.  He has no submandibular swelling.  He has no sublingual swelling.  He has a midline uvula.  Eyes:      Extraocular Movements: Extraocular movements intact.   Neck:      Musculoskeletal: Normal range of motion.      Comments: Full range of motion of neck without difficulty or limitation.  Cardiovascular:      Rate and Rhythm: Normal rate and regular rhythm.      Pulses: Normal pulses.      Heart sounds: Normal heart sounds. No murmur. No friction rub. No gallop.    Pulmonary:      Effort: Pulmonary effort is normal. No respiratory distress.      Breath sounds: Normal breath sounds. No stridor. No wheezing, rhonchi or rales.   Chest:      Chest wall: No tenderness.   Abdominal:      General: Abdomen is flat. Bowel sounds are normal. There is no distension.      Palpations: Abdomen is soft. There is no mass.      Tenderness: There is no abdominal tenderness. There is no guarding or rebound.   Musculoskeletal: Normal range of motion.         General: No swelling, tenderness, deformity or signs of injury.   Skin:     General: Skin is warm and dry.      Capillary Refill: Capillary refill takes less than 2 seconds.      Coloration: Skin is not jaundiced or pale.      Findings: No bruising, erythema or rash.   Neurological:      General: No focal deficit present.      Mental Status: He is alert and oriented to person, place, and time.   Psychiatric:         Mood and Affect: Mood normal.         Behavior: Behavior normal.         Thought Content: Thought content normal.         Judgment: Judgment normal.         Procedures           ED Course                                           MDM  Number of Diagnoses or Management Options  Pain, dental: new and requires workup  Diagnosis management comments: Patient presents with dental pain.  Upon arrival he is in no acute distress vital signs are reassuring.  Physical  exam shows no signs of abscess.  Extraocular movements are intact without pain or limitations have low concern for orbital cellulitis or cavernous sinus thrombus.  Low concern for Michael's angina with no submandibular or sublingual swelling.  Low concern for peritonsillar abscess with a midline uvula and no unilateral swelling.  Low concern for retropharyngeal abscess with no pain with full range of motion of his neck.  He has no posterior neck swelling.  He is tolerating his secretions without difficulty.  He is tolerating oral intake without difficulty.  Overall, he has no signs of systemic infection, no signs of airway obstruction.  He has no drainable abscess on exam.  He does have poor dentition throughout.  We will treat with clindamycin and Peridex rinses.  He is given Toradol in the emergency department.  I tell him he needs to see a dentist in the next 1 to 2 days and he states that he well.  He is given commonsense return precautions which he verbalizes understanding of and is discharged in good condition.    Risk of Complications, Morbidity, and/or Mortality  Presenting problems: moderate  Diagnostic procedures: minimal  Management options: moderate    Patient Progress  Patient progress: stable      Final diagnoses:   Pain, dental            Sung Glover MD  11/21/20 4299

## 2020-11-24 ASSESSMENT — ENCOUNTER SYMPTOMS
ABDOMINAL PAIN: 0
SHORTNESS OF BREATH: 0
BACK PAIN: 1

## 2020-11-24 NOTE — ED PROVIDER NOTES
[facility] EMERGENCY DEPT  eMERGENCY dEPARTMENT eNCOUnter      Pt Name: Julissa Harmon  MRN: [id number]  Birthdate 1980  Date of evaluation: 10/21/2020  Provider: Garth Gonzalez MD    CHIEF COMPLAINT       Chief Complaint   Patient presents with    Back Pain     \"i pulled on tires working on my car\"         HISTORY OF PRESENT ILLNESS   (Location/Symptom, Timing/Onset,Context/Setting, Quality, Duration, Modifying Factors, Severity)  Note limiting factors. Julissa Harmon is a 36 y.o. male who presents to the emergency department for evaluation of pain in his low back area. He describes this pain as sharp in nature, located over the lumbar region of his low back. States that he was pulling some tires off of his car over the past couple days and thinks that he may have strained his back. Denies any lower extremity numbness or tingling. States he is not had difficulty going to the bathroom. He is able to ambulate although that seems to make his back pain worse. HPI    NursingNotes were reviewed. REVIEW OF SYSTEMS    (2-9 systems for level 4, 10 or more for level 5)     Review of Systems   Constitutional: Negative for chills and fever. Respiratory: Negative for shortness of breath. Cardiovascular: Negative for chest pain. Gastrointestinal: Negative for abdominal pain. Musculoskeletal: Positive for back pain and gait problem. Negative for neck pain. All other systems reviewed and are negative. PAST MEDICALHISTORY     Past Medical History:   Diagnosis Date    Back pain     Chest pain     Scoliosis     Tobacco abuse     Vitamin D deficiency          SURGICAL HISTORY       Past Surgical History:   Procedure Laterality Date    BACK SURGERY      x 3          CURRENT MEDICATIONS   There are no discharge medications for this patient. ALLERGIES     Flexeril [cyclobenzaprine]; Toradol [ketorolac tromethamine];  Demerol hcl [meperidine]; and Other    FAMILY HISTORY       Family History Problem Relation Age of Onset    Heart Disease Mother     Hypertension Mother     Cancer Father         prostate          SOCIAL HISTORY       Social History     Socioeconomic History    Marital status: Single     Spouse name: None    Number of children: None    Years of education: None    Highest education level: None   Occupational History    None   Social Needs    Financial resource strain: None    Food insecurity     Worry: None     Inability: None    Transportation needs     Medical: None     Non-medical: None   Tobacco Use    Smoking status: Current Every Day Smoker     Packs/day: 0.50    Smokeless tobacco: Never Used   Substance and Sexual Activity    Alcohol use: No    Drug use: No    Sexual activity: None   Lifestyle    Physical activity     Days per week: None     Minutes per session: None    Stress: None   Relationships    Social connections     Talks on phone: None     Gets together: None     Attends Confucianist service: None     Active member of club or organization: None     Attends meetings of clubs or organizations: None     Relationship status: None    Intimate partner violence     Fear of current or ex partner: None     Emotionally abused: None     Physically abused: None     Forced sexual activity: None   Other Topics Concern    None   Social History Narrative    None       SCREENINGS    [place] Coma Scale  Eye Opening: Spontaneous  Best Verbal Response: Oriented  Best Motor Response: Obeys commands  [place] Coma Scale Score: 15        PHYSICAL EXAM    (up to 7 for level 4, 8 or more for level 5)     ED Triage Vitals [10/21/20 0153]   BP Temp Temp Source Pulse Resp SpO2 Height Weight   (!) 143/88 98 °F (36.7 °C) Oral 76 18 98 % 5' 6\" (1.676 m) 215 lb (97.5 kg)       Physical Exam  Vitals signs and nursing note reviewed. HENT:      Mouth/Throat:      Mouth: Mucous membranes are not dry. Cardiovascular:      Rate and Rhythm: Normal rate and regular rhythm.       Heart sounds: Normal heart sounds. Pulmonary:      Effort: Pulmonary effort is normal. No respiratory distress. Breath sounds: Normal breath sounds. Abdominal:      Palpations: Abdomen is soft. Tenderness: There is no abdominal tenderness. Musculoskeletal:      Lumbar back: He exhibits tenderness. Back:    Neurological:      General: No focal deficit present. Mental Status: He is alert and oriented to person, place, and time. DIAGNOSTIC RESULTS         LABS:  Labs Reviewed - No data to display    All other labs were within normal range or not returned as of this dictation. EMERGENCY DEPARTMENT COURSE and DIFFERENTIAL DIAGNOSIS/MDM:   Vitals:    Vitals:    10/21/20 0153 10/21/20 0220   BP: (!) 143/88    Pulse: 76 76   Resp: 18    Temp: 98 °F (36.7 °C)    TempSrc: Oral    SpO2: 98%    Weight: 215 lb (97.5 kg)    Height: 5' 6\" (1.676 m)        MDM    PROCEDURES:  Unless otherwise noted below, none     Procedures    FINAL IMPRESSION      1. Acute exacerbation of chronic low back pain          DISPOSITION/PLAN   DISPOSITION Decision To Discharge 10/21/2020 03:36:15 AM      PATIENT REFERRED TO:  Bobbi Galaviz MD  [facility]  [address] [facility] [place] [address] [phone]            DISCHARGE MEDICATIONS:  There are no discharge medications for this patient.          (Please note that portions of this note were completed with a voice recognition program.  Efforts were made to edit thedictations but occasionally words are mis-transcribed.)    Kayli Diaz MD (electronically signed)  Attending Emergency Physician         Kayli Diaz MD  11/24/20 1400

## 2020-12-14 ENCOUNTER — APPOINTMENT (OUTPATIENT)
Dept: CT IMAGING | Facility: HOSPITAL | Age: 40
End: 2020-12-14

## 2020-12-14 ENCOUNTER — HOSPITAL ENCOUNTER (EMERGENCY)
Facility: HOSPITAL | Age: 40
Discharge: HOME | End: 2020-12-14
Admitting: EMERGENCY MEDICINE

## 2020-12-14 VITALS
HEIGHT: 66 IN | SYSTOLIC BLOOD PRESSURE: 157 MMHG | DIASTOLIC BLOOD PRESSURE: 100 MMHG | OXYGEN SATURATION: 98 % | TEMPERATURE: 98.5 F | HEART RATE: 71 BPM | BODY MASS INDEX: 35.36 KG/M2 | WEIGHT: 220 LBS | RESPIRATION RATE: 15 BRPM

## 2020-12-14 DIAGNOSIS — M54.6 ACUTE THORACIC BACK PAIN, UNSPECIFIED BACK PAIN LATERALITY: Primary | ICD-10-CM

## 2020-12-14 DIAGNOSIS — G89.29 ACUTE EXACERBATION OF CHRONIC LOW BACK PAIN: ICD-10-CM

## 2020-12-14 DIAGNOSIS — M54.50 ACUTE EXACERBATION OF CHRONIC LOW BACK PAIN: ICD-10-CM

## 2020-12-14 PROCEDURE — 25010000002 DEXAMETHASONE PER 1 MG: Performed by: PHYSICIAN ASSISTANT

## 2020-12-14 PROCEDURE — 72131 CT LUMBAR SPINE W/O DYE: CPT

## 2020-12-14 PROCEDURE — 99283 EMERGENCY DEPT VISIT LOW MDM: CPT

## 2020-12-14 PROCEDURE — 25010000002 MORPHINE PER 10 MG: Performed by: FAMILY MEDICINE

## 2020-12-14 PROCEDURE — 96372 THER/PROPH/DIAG INJ SC/IM: CPT

## 2020-12-14 PROCEDURE — 72128 CT CHEST SPINE W/O DYE: CPT

## 2020-12-14 RX ORDER — DEXAMETHASONE SODIUM PHOSPHATE 4 MG/ML
4 INJECTION, SOLUTION INTRA-ARTICULAR; INTRALESIONAL; INTRAMUSCULAR; INTRAVENOUS; SOFT TISSUE ONCE
Status: COMPLETED | OUTPATIENT
Start: 2020-12-14 | End: 2020-12-14

## 2020-12-14 RX ORDER — LIDOCAINE 50 MG/G
1 PATCH TOPICAL ONCE
Status: DISCONTINUED | OUTPATIENT
Start: 2020-12-14 | End: 2020-12-14 | Stop reason: HOSPADM

## 2020-12-14 RX ORDER — METAXALONE 800 MG/1
800 TABLET ORAL 3 TIMES DAILY PRN
Qty: 12 TABLET | Refills: 0 | OUTPATIENT
Start: 2020-12-14 | End: 2021-03-17

## 2020-12-14 RX ORDER — LIDOCAINE 50 MG/G
1 PATCH TOPICAL EVERY 24 HOURS
Qty: 6 EACH | Refills: 0 | Status: SHIPPED | OUTPATIENT
Start: 2020-12-14 | End: 2021-02-09 | Stop reason: SDUPTHER

## 2020-12-14 RX ORDER — MORPHINE SULFATE 10 MG/ML
6 INJECTION INTRAMUSCULAR; INTRAVENOUS; SUBCUTANEOUS ONCE
Status: COMPLETED | OUTPATIENT
Start: 2020-12-14 | End: 2020-12-14

## 2020-12-14 RX ADMIN — LIDOCAINE 1 PATCH: 50 PATCH CUTANEOUS at 16:32

## 2020-12-14 RX ADMIN — MORPHINE SULFATE 6 MG: 10 INJECTION, SOLUTION INTRAMUSCULAR; INTRAVENOUS at 16:02

## 2020-12-14 RX ADMIN — DEXAMETHASONE SODIUM PHOSPHATE 4 MG: 4 INJECTION, SOLUTION INTRAMUSCULAR; INTRAVENOUS at 16:00

## 2021-02-09 ENCOUNTER — HOSPITAL ENCOUNTER (EMERGENCY)
Facility: HOSPITAL | Age: 41
Discharge: HOME | End: 2021-02-09
Admitting: FAMILY MEDICINE

## 2021-02-09 ENCOUNTER — APPOINTMENT (OUTPATIENT)
Dept: CT IMAGING | Facility: HOSPITAL | Age: 41
End: 2021-02-09

## 2021-02-09 VITALS
DIASTOLIC BLOOD PRESSURE: 100 MMHG | WEIGHT: 224 LBS | RESPIRATION RATE: 16 BRPM | HEART RATE: 90 BPM | OXYGEN SATURATION: 98 % | BODY MASS INDEX: 36 KG/M2 | HEIGHT: 66 IN | TEMPERATURE: 98.6 F | SYSTOLIC BLOOD PRESSURE: 154 MMHG

## 2021-02-09 DIAGNOSIS — M54.42 ACUTE BILATERAL LOW BACK PAIN WITH BILATERAL SCIATICA: Primary | ICD-10-CM

## 2021-02-09 DIAGNOSIS — M54.41 ACUTE BILATERAL LOW BACK PAIN WITH BILATERAL SCIATICA: Primary | ICD-10-CM

## 2021-02-09 PROCEDURE — 96372 THER/PROPH/DIAG INJ SC/IM: CPT

## 2021-02-09 PROCEDURE — 72128 CT CHEST SPINE W/O DYE: CPT

## 2021-02-09 PROCEDURE — 99283 EMERGENCY DEPT VISIT LOW MDM: CPT

## 2021-02-09 PROCEDURE — 25010000002 DEXAMETHASONE PER 1 MG: Performed by: PHYSICIAN ASSISTANT

## 2021-02-09 PROCEDURE — 25010000002 MORPHINE PER 10 MG: Performed by: EMERGENCY MEDICINE

## 2021-02-09 PROCEDURE — 72131 CT LUMBAR SPINE W/O DYE: CPT

## 2021-02-09 RX ORDER — HYDROCODONE BITARTRATE AND ACETAMINOPHEN 7.5; 325 MG/1; MG/1
1 TABLET ORAL ONCE
Status: COMPLETED | OUTPATIENT
Start: 2021-02-09 | End: 2021-02-09

## 2021-02-09 RX ORDER — LIDOCAINE 50 MG/G
1 PATCH TOPICAL ONCE
Status: DISCONTINUED | OUTPATIENT
Start: 2021-02-09 | End: 2021-02-09 | Stop reason: HOSPADM

## 2021-02-09 RX ORDER — MORPHINE SULFATE 10 MG/ML
6 INJECTION INTRAMUSCULAR; INTRAVENOUS; SUBCUTANEOUS ONCE
Status: COMPLETED | OUTPATIENT
Start: 2021-02-09 | End: 2021-02-09

## 2021-02-09 RX ORDER — DEXAMETHASONE SODIUM PHOSPHATE 4 MG/ML
4 INJECTION, SOLUTION INTRA-ARTICULAR; INTRALESIONAL; INTRAMUSCULAR; INTRAVENOUS; SOFT TISSUE ONCE
Status: COMPLETED | OUTPATIENT
Start: 2021-02-09 | End: 2021-02-09

## 2021-02-09 RX ORDER — LIDOCAINE 50 MG/G
1 PATCH TOPICAL EVERY 24 HOURS
Qty: 6 EACH | Refills: 0 | Status: SHIPPED | OUTPATIENT
Start: 2021-02-09 | End: 2021-02-21

## 2021-02-09 RX ADMIN — MORPHINE SULFATE 6 MG: 10 INJECTION, SOLUTION INTRAMUSCULAR; INTRAVENOUS at 15:57

## 2021-02-09 RX ADMIN — HYDROCODONE BITARTRATE AND ACETAMINOPHEN 1 TABLET: 7.5; 325 TABLET ORAL at 17:20

## 2021-02-09 RX ADMIN — DEXAMETHASONE SODIUM PHOSPHATE 4 MG: 4 INJECTION, SOLUTION INTRAMUSCULAR; INTRAVENOUS at 15:57

## 2021-02-09 RX ADMIN — LIDOCAINE 1 PATCH: 50 PATCH CUTANEOUS at 16:16

## 2021-02-09 NOTE — DISCHARGE INSTRUCTIONS
Follow up with one of the [facility] physician groups below to setup primary care. If you have trouble making an appointment, please call the [facility] Nurse Line at [phone]    Dr. Мария Shelby DO, Dr. Arlene Victoria DO, and CATHIE Caprio  [facility]  [address] [place] [address], [id number] [phone]    Dr. Alton Garcia MD  [facility] - [address] [address], [address], [place], [place] [id number] [phone]    Dr. Brenden Bah DO, Dr. Kofi Rucker DO,  CATHIE Briggs, and CATHIE Petersen  [facility] - [address] [address], [address], [place], [place] [id number] [phone]     Dr. Henrietta Mckeon MD, and CATHIE Tony  [address] [id number] [phone]    Dr. Zeus Forman MD and Dr. Henri Springer MD  [address], [address] [phone]    Dr. Jaylon Santiago MD  [facility]  [address] [place] [address], [facility] [place], [place], [address] [phone]    Dr. Mike Mason MD  [facility]  [address] [place], [place], [address] [phone]        Sciatica    Sciatica is pain, numbness, weakness, or tingling along the path of the sciatic nerve. The sciatic nerve starts in the lower back and runs down the back of each leg. The nerve controls the muscles in the lower leg and in the back of the knee. It also provides feeling (sensation) to the back of the thigh, the lower leg, and the sole of the foot. Sciatica is a symptom of another medical condition that pinches or puts pressure on the sciatic nerve.  Sciatica most often only affects one side of the body. Sciatica usually goes away on its  own or with treatment. In some cases, sciatica may come back (recur).  What are the causes?  This condition is caused by pressure on the sciatic nerve or pinching of the nerve. This may be the result of:  · A disk in between the bones of the spine bulging out too far (herniated disk).  · Age-related changes in the spinal disks.  · A pain disorder that affects a muscle in the buttock.  · Extra bone growth near the sciatic nerve.  · A break (fracture) of the pelvis.  · Pregnancy.  · Tumor. This is rare.  What increases the risk?  The following factors may make you more likely to develop this condition:  · Playing sports that place pressure or stress on the spine.  · Having poor strength and flexibility.  · A history of back injury or surgery.  · Sitting for long periods of time.  · Doing activities that involve repetitive bending or lifting.  · Obesity.  What are the signs or symptoms?  Symptoms can vary from mild to very severe, and they may include:  · Any of these problems in the lower back, leg, hip, or buttock:  ? Mild tingling, numbness, or dull aches.  ? Burning sensations.  ? Sharp pains.  · Numbness in the back of the calf or the sole of the foot.  · Leg weakness.  · Severe back pain that makes movement difficult.  Symptoms may get worse when you cough, sneeze, or laugh, or when you sit or stand for long periods of time.  How is this diagnosed?  This condition may be diagnosed based on:  · Your symptoms and medical history.  · A physical exam.  · Blood tests.  · Imaging tests, such as:  ? X-rays.  ? MRI.  ? CT scan.  How is this treated?  In many cases, this condition improves on its own without treatment. However, treatment may include:  · Reducing or modifying physical activity.  · Exercising and stretching.  · Icing and applying heat to the affected area.  · Medicines that help to:  ? Relieve pain and swelling.  ? Relax your muscles.  · Injections of medicines that help to relieve pain, irritation, and  inflammation around the sciatic nerve (steroids).  · Surgery.  Follow these instructions at home:  Medicines  · Take over-the-counter and prescription medicines only as told by your health care provider.  · Ask your health care provider if the medicine prescribed to you:  ? Requires you to avoid driving or using heavy machinery.  ? Can cause constipation. You may need to take these actions to prevent or treat constipation:  § Drink enough fluid to keep your urine pale yellow.  § Take over-the-counter or prescription medicines.  § Eat foods that are high in fiber, such as beans, whole grains, and fresh fruits and vegetables.  § Limit foods that are high in fat and processed sugars, such as fried or sweet foods.  Managing pain         · If directed, put ice on the affected area.  ? Put ice in a plastic bag.  ? Place a towel between your skin and the bag.  ? Leave the ice on for 20 minutes, 2-3 times a day.  · If directed, apply heat to the affected area. Use the heat source that your health care provider recommends, such as a moist heat pack or a heating pad.  ? Place a towel between your skin and the heat source.  ? Leave the heat on for 20-30 minutes.  ? Remove the heat if your skin turns bright red. This is especially important if you are unable to feel pain, heat, or cold. You may have a greater risk of getting burned.  Activity    · Return to your normal activities as told by your health care provider. Ask your health care provider what activities are safe for you.  · Avoid activities that make your symptoms worse.  · Take brief periods of rest throughout the day.  ? When you rest for longer periods, mix in some mild activity or stretching between periods of rest. This will help to prevent stiffness and pain.  ? Avoid sitting for long periods of time without moving. Get up and move around at least one time each hour.  · Exercise and stretch regularly, as told by your health care provider.  · Do not lift anything  that is heavier than 10 lb (4.5 kg) while you have symptoms of sciatica. When you do not have symptoms, you should still avoid heavy lifting, especially repetitive heavy lifting.  · When you lift objects, always use proper lifting technique, which includes:  ? Bending your knees.  ? Keeping the load close to your body.  ? Avoiding twisting.  General instructions  · Maintain a healthy weight. Excess weight puts extra stress on your back.  · Wear supportive, comfortable shoes. Avoid wearing high heels.  · Avoid sleeping on a mattress that is too soft or too hard. A mattress that is firm enough to support your back when you sleep may help to reduce your pain.  · Keep all follow-up visits as told by your health care provider. This is important.  Contact a health care provider if:  · You have pain that:  ? Wakes you up when you are sleeping.  ? Gets worse when you lie down.  ? Is worse than you have experienced in the past.  ? Lasts longer than 4 weeks.  · You have an unexplained weight loss.  Get help right away if:  · You are not able to control when you urinate or have bowel movements (incontinence).  · You have:  ? Weakness in your lower back, pelvis, buttocks, or legs that gets worse.  ? Redness or swelling of your back.  ? A burning sensation when you urinate.  Summary  · Sciatica is pain, numbness, weakness, or tingling along the path of the sciatic nerve.  · This condition is caused by pressure on the sciatic nerve or pinching of the nerve.  · Sciatica can cause pain, numbness, or tingling in the lower back, legs, hips, and buttocks.  · Treatment often includes rest, exercise, medicines, and applying ice or heat.  This information is not intended to replace advice given to you by your health care provider. Make sure you discuss any questions you have with your health care provider.  Document Revised: 01/06/2020 Document Reviewed: 01/06/2020  Elsevier Patient Education © 2020 [employer]    Acute Back Pain,  Adult  Acute back pain is sudden and usually short-lived. It is often caused by an injury to the muscles and tissues in the back. The injury may result from:  · A muscle or ligament getting overstretched or torn (strained). Ligaments are tissues that connect bones to each other. Lifting something improperly can cause a back strain.  · Wear and tear (degeneration) of the spinal disks. Spinal disks are circular tissue that provides cushioning between the bones of the spine (vertebrae).  · Twisting motions, such as while playing sports or doing yard work.  · A hit to the back.  · Arthritis.  You may have a physical exam, lab tests, and imaging tests to find the cause of your pain. Acute back pain usually goes away with rest and home care.  Follow these instructions at home:  Managing pain, stiffness, and swelling  · Take over-the-counter and prescription medicines only as told by your health care provider.  · Your health care provider may recommend applying ice during the first 24-48 hours after your pain starts. To do this:  ? Put ice in a plastic bag.  ? Place a towel between your skin and the bag.  ? Leave the ice on for 20 minutes, 2-3 times a day.  · If directed, apply heat to the affected area as often as told by your health care provider. Use the heat source that your health care provider recommends, such as a moist heat pack or a heating pad.  ? Place a towel between your skin and the heat source.  ? Leave the heat on for 20-30 minutes.  ? Remove the heat if your skin turns bright red. This is especially important if you are unable to feel pain, heat, or cold. You have a greater risk of getting burned.  Activity    · Do not stay in bed. Staying in bed for more than 1-2 days can delay your recovery.  · Sit up and stand up straight. Avoid leaning forward when you sit, or hunching over when you stand.  ? If you work at a desk, sit close to it so you do not need to lean over. Keep your chin tucked in. Keep your  "neck drawn back, and keep your elbows bent at a right angle. Your arms should look like the letter \"L.\"  ? Sit high and close to the steering wheel when you drive. Add lower back (lumbar) support to your car seat, if needed.  · Take short walks on even surfaces as soon as you are able. Try to increase the length of time you walk each day.  · Do not sit, drive, or  one place for more than 30 minutes at a time. Sitting or standing for long periods of time can put stress on your back.  · Do not drive or use heavy machinery while taking prescription pain medicine.  · Use proper lifting techniques. When you bend and lift, use positions that put less stress on your back:  ? Bend your knees.  ? Keep the load close to your body.  ? Avoid twisting.  · Exercise regularly as told by your health care provider. Exercising helps your back heal faster and helps prevent back injuries by keeping muscles strong and flexible.  · Work with a physical therapist to make a safe exercise program, as recommended by your health care provider. Do any exercises as told by your physical therapist.  Lifestyle  · Maintain a healthy weight. Extra weight puts stress on your back and makes it difficult to have good posture.  · Avoid activities or situations that make you feel anxious or stressed. Stress and anxiety increase muscle tension and can make back pain worse. Learn ways to manage anxiety and stress, such as through exercise.  General instructions  · Sleep on a firm mattress in a comfortable position. Try lying on your side with your knees slightly bent. If you lie on your back, put a pillow under your knees.  · Follow your treatment plan as told by your health care provider. This may include:  ? Cognitive or behavioral therapy.  ? Acupuncture or massage therapy.  ? Meditation or yoga.  Contact a health care provider if:  · You have pain that is not relieved with rest or medicine.  · You have increasing pain going down into your legs " or buttocks.  · Your pain does not improve after 2 weeks.  · You have pain at night.  · You lose weight without trying.  · You have a fever or chills.  Get help right away if:  · You develop new bowel or bladder control problems.  · You have unusual weakness or numbness in your arms or legs.  · You develop nausea or vomiting.  · You develop abdominal pain.  · You feel faint.  Summary  · Acute back pain is sudden and usually short-lived.  · Use proper lifting techniques. When you bend and lift, use positions that put less stress on your back.  · Take over-the-counter and prescription medicines and apply heat or ice as directed by your health care provider.  This information is not intended to replace advice given to you by your health care provider. Make sure you discuss any questions you have with your health care provider.  Document Revised: 04/07/2020 Document Reviewed: 08/01/2018  Elsevier Patient Education © 2020 [employer] Inc[employer]

## 2021-02-10 NOTE — ED PROVIDER NOTES
Subjective   History of Present Illness    Patient is a 40-year-old male presenting to ED with back pain.  Patient reports he has a medical history positive for chronic back pain with a history of x3 previous spinal surgical interventions.  Patient reports his last spinal surgery was performed approximately 6 years ago by Dr. Roy and Dr. Wellington.  Patient reports since that time he has had no problems and he has had continued routine follow-up with Dr. Wellington with his last evaluation within the past couple months.  Patient reports 4 days ago he developed a pain  In his lower thoracic as well as lumbar spine which radiates into his bilateral buttocks and down his right leg as a slight tingling sensation.  Patient denies any numbness or weakness of the lower extremities.  Patient reports that with any movements of his core or ambulation he had increase of pain but he denies any difficulties with ambulation.  Patient denies any known injury including blunt trauma, falls, or any specific lifting/twisting/bending injuries.  Patient denies any fevers, saddle anesthesia, incontinence of bowel or bladder.  Patient denies any radiation superior into his cervical spine.  Patient reports he is been trying to take ibuprofen with no relief of the pain.  Patient denies any other recent illnesses or known sick contact.  Patient reports that it has been over a year since he has received any injections of pain or steroid medications into his spine.  Patient denies any epidurals or lumbar punctures within the past 6 months.    Patient has known medication allergies to Benadryl, Darvon, Dilaudid, Flexeril, naproxen, Nubain, Toradol, Zanaflex.  Patient has medical history positive for hypertension, neuropathy, chronic back pain.  Patient takes daily lisinopril.  Patient has a surgical history positive for back surgeries.  Patient reports he is a current 1 pack/day cigarette smoker.  Patient denies use of any further tobacco products,  alcohol, marijuana, or any other IV/recreational/illicit drugs.    Records reviewed show patient was last seen in the ED on 12/14/2020 for acute thoracic back pain, acute exacerbation of chronic low back pain.  Patient was also seen on 11/19/2020 for acute exacerbation of chronic low back pain, 10/21/2020 for the same.    Patient's last spinal imaging was a lumbar and thoracic CT performed on 12/14/2020 which showed: Stable appearance of the thoracic spine, no visualized acute fracture.  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  Underlying scoliotic curvature and T11 segmentation anomaly with posterior lateral spinal fusion identified at multiple levels.  No acute fracture.  Left lateral fusion construct extends from T12-L4 and fusion eric is broken between L3 and L4 segments.  This is a stable finding.  Hardware otherwise unremarkable.    Review of Systems   Constitutional: Negative.    HENT: Negative.    Eyes: Negative.    Respiratory: Negative.    Cardiovascular: Negative.    Gastrointestinal: Negative.    Genitourinary: Negative.         Denies incontinence of bowel or bladder   Musculoskeletal: Positive for back pain (Lower). Negative for gait problem (Increased pain with ambulation but no difficulty with gait).   Skin: Negative.    Neurological: Negative.  Negative for weakness and numbness.        Denies saddle anesthesia   Psychiatric/Behavioral: Negative.    All other systems reviewed and are negative.      Past Medical History:   Diagnosis Date   • Back pain    • Hypertension    • Neuropathy        Allergies   Allergen Reactions   • Benadryl [Diphenhydramine] Other (See Comments)     Restless leg   • Darvon [Propoxyphene]    • Dilaudid [Hydromorphone Hcl]    • Flexeril [Cyclobenzaprine]    • Naproxen    • Nubain [Nalbuphine Hcl]    • Toradol [Ketorolac Tromethamine]    • Zanaflex [Tizanidine Hcl] Other  (See Comments)     Restless Leg        Past Surgical History:   Procedure Laterality Date   • BACK SURGERY         History reviewed. No pertinent family history.    Social History     Socioeconomic History   • Marital status:      Spouse name: Not on file   • Number of children: Not on file   • Years of education: Not on file   • Highest education level: Not on file   Tobacco Use   • Smoking status: Current Every Day Smoker     Packs/day: 0.50     Types: Cigarettes   Substance and Sexual Activity   • Alcohol use: No   • Drug use: No   • Sexual activity: Defer           Objective   Physical Exam  Vitals signs and nursing note reviewed.   Constitutional:       General: He is not in acute distress.     Appearance: Normal appearance. He is well-developed, well-groomed and overweight. He is not ill-appearing or diaphoretic.   HENT:      Head: Normocephalic and atraumatic.      Mouth/Throat:      Mouth: Mucous membranes are moist.      Pharynx: Oropharynx is clear.   Eyes:      Extraocular Movements: Extraocular movements intact.      Conjunctiva/sclera: Conjunctivae normal.      Pupils: Pupils are equal, round, and reactive to light.   Neck:      Musculoskeletal: Normal range of motion.   Cardiovascular:      Rate and Rhythm: Regular rhythm. Tachycardia present.   Pulmonary:      Effort: Pulmonary effort is normal. No respiratory distress.      Breath sounds: Normal breath sounds.   Abdominal:      General: Bowel sounds are normal.      Palpations: Abdomen is soft.      Tenderness: There is no abdominal tenderness. There is no right CVA tenderness or left CVA tenderness.   Musculoskeletal: Normal range of motion.      Thoracic back: He exhibits tenderness and bony tenderness. He exhibits normal range of motion.      Lumbar back: He exhibits tenderness and bony tenderness. He exhibits normal range of motion.      Right lower leg: No edema.      Left lower leg: No edema.      Comments: Right leg positive Lasegue's  sign  Left leg negative Lasegue's sign    5/5 symmetric strength bilateral lower extremities   Skin:     General: Skin is warm and dry.      Findings: No signs of injury, lesion, rash or wound.   Neurological:      Mental Status: He is alert and oriented to person, place, and time.      Sensory: Sensation is intact.      Motor: Motor function is intact.      Gait: Gait normal.   Psychiatric:         Attention and Perception: Attention normal.         Mood and Affect: Mood and affect normal.         Speech: Speech normal.         Behavior: Behavior normal. Behavior is cooperative.         Procedures           ED Course  ED Course as of Feb 09 2258   Tue Feb 09, 2021   1613 CT pending transportation to department.     [JS]   1635 CT pending dictation.     [JS]   1649 Thoracic CT shows: Stable appearance of the thoracic spine with no acute vertebral fracture. Stable T11 segmentation anomaly with partially imaged spinal fusion of T12 into the lumbar spine. Schmorl's nodes versus partial segmentation at the T8 level unchanged.    [JS]   1653 Lumbar CT shows: There is a left curvature of the thoracolumbar spine with posterior fusion at T12-S1 as well as lateral fusion as described above. Hardware remains stable in appearance. No acute lumbar vertebral pathology identified. Stable T11 segmentation anomaly    [JS]   1654 Discussed case at this time with Dr. John Madrid.  Conservative therapy     [JS]   1701 Upon reevaluation this time patient reports he is feeling better but is requesting 1 more dose of pain medication, will order.  Discussed with patient CT findings.  Discussed need for continued spinal surgeon follow-up.  Discussed need for reevaluation within the next 24 to 48 hours.  Discussed return precautions need for immediate return to ED should he develop any new or worsening symptoms.  Patient with no further questions, concerns, needs at this time is now stable for discharge.    [JS]      ED Course User  Index  [JS] Sam Mayen PA-C                                           MDM  Number of Diagnoses or Management Options  Acute bilateral low back pain with bilateral sciatica:      Amount and/or Complexity of Data Reviewed  Tests in the radiology section of CPT®: reviewed and ordered  Tests in the medicine section of CPT®: reviewed and ordered  Decide to obtain previous medical records or to obtain history from someone other than the patient: yes  Review and summarize past medical records: yes  Discuss the patient with other providers: yes (Dr. Madrid (attending))    Patient Progress  Patient progress: improved      Final diagnoses:   Acute bilateral low back pain with bilateral sciatica            Sam Mayen PA-C  02/09/21 2383

## 2021-02-21 ENCOUNTER — HOSPITAL ENCOUNTER (EMERGENCY)
Facility: HOSPITAL | Age: 41
Discharge: HOME | End: 2021-02-21
Admitting: EMERGENCY MEDICINE

## 2021-02-21 ENCOUNTER — APPOINTMENT (OUTPATIENT)
Dept: GENERAL RADIOLOGY | Facility: HOSPITAL | Age: 41
End: 2021-02-21

## 2021-02-21 VITALS
HEIGHT: 66 IN | DIASTOLIC BLOOD PRESSURE: 94 MMHG | SYSTOLIC BLOOD PRESSURE: 154 MMHG | BODY MASS INDEX: 35.36 KG/M2 | WEIGHT: 220 LBS | TEMPERATURE: 98.7 F | OXYGEN SATURATION: 95 % | RESPIRATION RATE: 18 BRPM | HEART RATE: 96 BPM

## 2021-02-21 DIAGNOSIS — G89.29 ACUTE EXACERBATION OF CHRONIC LOW BACK PAIN: Primary | ICD-10-CM

## 2021-02-21 DIAGNOSIS — M54.50 ACUTE EXACERBATION OF CHRONIC LOW BACK PAIN: Primary | ICD-10-CM

## 2021-02-21 PROCEDURE — 72110 X-RAY EXAM L-2 SPINE 4/>VWS: CPT

## 2021-02-21 PROCEDURE — 25010000002 ONDANSETRON PER 1 MG: Performed by: NURSE PRACTITIONER

## 2021-02-21 PROCEDURE — 96372 THER/PROPH/DIAG INJ SC/IM: CPT

## 2021-02-21 PROCEDURE — 99283 EMERGENCY DEPT VISIT LOW MDM: CPT

## 2021-02-21 PROCEDURE — 25010000002 MORPHINE PER 10 MG: Performed by: NURSE PRACTITIONER

## 2021-02-21 RX ORDER — ONDANSETRON 2 MG/ML
4 INJECTION INTRAMUSCULAR; INTRAVENOUS ONCE
Status: COMPLETED | OUTPATIENT
Start: 2021-02-21 | End: 2021-02-21

## 2021-02-21 RX ADMIN — MORPHINE SULFATE 4 MG: 4 INJECTION, SOLUTION INTRAMUSCULAR; INTRAVENOUS at 16:39

## 2021-02-21 RX ADMIN — ONDANSETRON HYDROCHLORIDE 4 MG: 2 SOLUTION INTRAMUSCULAR; INTRAVENOUS at 16:39

## 2021-02-21 NOTE — ED PROVIDER NOTES
Subjective   Patient is a 40-year-old male presents emergency department with chief complaints of low back pain with radiating symptoms to the right hip.  Patient states he must of slept awkwardly as he woke up with the symptoms.  He denies any recent straining injury.  He denies any recent fall.  He has had complaints of low back pain in the past.  He was evaluated for back pain in this emergency department on February 9, 2021.  CT scans on that date showed hardware was in good place and stable.  Patient denies any saddle anesthesia or loss of bowel or bladder control.  According to patient he had back surgery performed 6 or 7 years ago and has been followed by both Dr. Roy as well as Dr. Wellington with orthopedics.  Per Emmanuel patient had pain medication prescribed by Dr. Wellington on January 29, 2021.  He tells me he is in the process of getting referred back to pain management by orthopedics.  He admits to having chronic back pain.          Review of Systems   Constitutional: Negative.  Negative for fever.   HENT: Negative.  Negative for congestion.    Eyes: Negative.    Respiratory: Negative.  Negative for cough and shortness of breath.    Cardiovascular: Negative.  Negative for chest pain.   Gastrointestinal: Negative.  Negative for abdominal pain and vomiting.   Genitourinary: Negative.  Negative for difficulty urinating.   Musculoskeletal: Positive for back pain.   Skin: Negative.  Negative for rash and wound.   Neurological: Negative for weakness and numbness.   All other systems reviewed and are negative.      Past Medical History:   Diagnosis Date   • Back pain    • Hypertension    • Neuropathy        Allergies   Allergen Reactions   • Benadryl [Diphenhydramine] Other (See Comments)     Restless leg   • Darvon [Propoxyphene]    • Dilaudid [Hydromorphone Hcl]    • Flexeril [Cyclobenzaprine]    • Naproxen    • Nubain [Nalbuphine Hcl]    • Toradol [Ketorolac Tromethamine]    • Zanaflex [Tizanidine Hcl] Other  (See Comments)     Restless Leg        Past Surgical History:   Procedure Laterality Date   • BACK SURGERY         History reviewed. No pertinent family history.    Social History     Socioeconomic History   • Marital status:      Spouse name: Not on file   • Number of children: Not on file   • Years of education: Not on file   • Highest education level: Not on file   Tobacco Use   • Smoking status: Current Every Day Smoker     Packs/day: 0.50     Types: Cigarettes   Substance and Sexual Activity   • Alcohol use: No   • Drug use: No   • Sexual activity: Defer           Objective   Physical Exam  Vitals signs and nursing note reviewed.   Constitutional:       General: He is not in acute distress.     Appearance: He is well-developed. He is not diaphoretic.   HENT:      Head: Atraumatic.      Right Ear: External ear normal.      Left Ear: External ear normal.      Nose: Nose normal.      Mouth/Throat:      Mouth: Mucous membranes are moist.      Pharynx: Oropharynx is clear.   Eyes:      General: No scleral icterus.     Extraocular Movements: Extraocular movements intact.      Conjunctiva/sclera: Conjunctivae normal.      Pupils: Pupils are equal, round, and reactive to light.   Neck:      Musculoskeletal: Normal range of motion and neck supple.      Thyroid: No thyromegaly.      Vascular: No JVD.   Cardiovascular:      Rate and Rhythm: Normal rate and regular rhythm.      Heart sounds: Normal heart sounds. No murmur.   Pulmonary:      Effort: Pulmonary effort is normal. No respiratory distress.      Breath sounds: Normal breath sounds. No wheezing or rales.   Chest:      Chest wall: No tenderness.   Abdominal:      General: Bowel sounds are normal. There is no distension.      Palpations: Abdomen is soft. There is no mass.      Tenderness: There is no abdominal tenderness. There is no guarding or rebound.   Musculoskeletal: Normal range of motion.      Comments: Pain to palpation of the lumbar spine without  step-off or vertebral point tenderness identified, neurovascular intact, sensory intact   Lymphadenopathy:      Cervical: No cervical adenopathy.   Skin:     General: Skin is warm and dry.      Capillary Refill: Capillary refill takes less than 2 seconds.      Coloration: Skin is not pale.      Findings: No erythema or rash.   Neurological:      General: No focal deficit present.      Mental Status: He is alert and oriented to person, place, and time.      Cranial Nerves: No cranial nerve deficit.      Coordination: Coordination normal.      Deep Tendon Reflexes: Reflexes are normal and symmetric.   Psychiatric:         Mood and Affect: Mood normal.         Behavior: Behavior normal.         Thought Content: Thought content normal.         Judgment: Judgment normal.         Procedures           ED Course as reviewed per Emmanuel #[id number] patient had a prescription for 20 days worth of pain medication prescribed by Dr. Wellington on January 29, 2021.  Patient admits to having chronic back pain.  He states that he is in the process of getting referred back to pain management by orthopedics.  Patient denies any recent injury including no fall and no straining event.  He was evaluated in this emergency department for similar back pain on February 9, 2021 and CAT scans noted hardware was in good place without any acute findings.  Explained to the patient this appears to be an acute exacerbation of her chronic back pain and will need to follow-up with orthopedics with continued symptoms.  Patient expressed understanding.  He denies any loss of bowel or bladder control or saddle anesthesia.  He is neurologically intact.                                           MDM  Number of Diagnoses or Management Options  Acute exacerbation of chronic low back pain: new and requires workup     Amount and/or Complexity of Data Reviewed  Tests in the radiology section of CPT®: ordered and reviewed  Decide to obtain previous medical records  or to obtain history from someone other than the patient: yes  Discuss the patient with other providers: yes    Risk of Complications, Morbidity, and/or Mortality  Presenting problems: low  Diagnostic procedures: low  Management options: low    Patient Progress  Patient progress: improved      Final diagnoses:   Acute exacerbation of chronic low back pain            Autumn Frederick, APRN  02/21/21 2006

## 2021-03-17 ENCOUNTER — HOSPITAL ENCOUNTER (EMERGENCY)
Facility: HOSPITAL | Age: 41
Discharge: HOME | End: 2021-03-17
Admitting: EMERGENCY MEDICINE

## 2021-03-17 VITALS
HEART RATE: 106 BPM | BODY MASS INDEX: 35.36 KG/M2 | DIASTOLIC BLOOD PRESSURE: 80 MMHG | HEIGHT: 66 IN | OXYGEN SATURATION: 99 % | RESPIRATION RATE: 16 BRPM | WEIGHT: 220 LBS | SYSTOLIC BLOOD PRESSURE: 161 MMHG | TEMPERATURE: 97.9 F

## 2021-03-17 DIAGNOSIS — S29.019A THORACIC MYOFASCIAL STRAIN, INITIAL ENCOUNTER: Primary | ICD-10-CM

## 2021-03-17 PROCEDURE — 25010000002 DEXAMETHASONE PER 1 MG: Performed by: NURSE PRACTITIONER

## 2021-03-17 PROCEDURE — 99282 EMERGENCY DEPT VISIT SF MDM: CPT

## 2021-03-17 PROCEDURE — 96372 THER/PROPH/DIAG INJ SC/IM: CPT

## 2021-03-17 PROCEDURE — 25010000002 ORPHENADRINE CITRATE PER 60 MG: Performed by: NURSE PRACTITIONER

## 2021-03-17 RX ORDER — DEXAMETHASONE SODIUM PHOSPHATE 10 MG/ML
10 INJECTION INTRAMUSCULAR; INTRAVENOUS ONCE
Status: COMPLETED | OUTPATIENT
Start: 2021-03-17 | End: 2021-03-17

## 2021-03-17 RX ORDER — ORPHENADRINE CITRATE 100 MG/1
100 TABLET, EXTENDED RELEASE ORAL 2 TIMES DAILY
Qty: 20 TABLET | Refills: 0 | Status: SHIPPED | OUTPATIENT
Start: 2021-03-17

## 2021-03-17 RX ORDER — ORPHENADRINE CITRATE 30 MG/ML
60 INJECTION INTRAMUSCULAR; INTRAVENOUS ONCE
Status: COMPLETED | OUTPATIENT
Start: 2021-03-17 | End: 2021-03-17

## 2021-03-17 RX ORDER — DEXAMETHASONE SODIUM PHOSPHATE 10 MG/ML
10 INJECTION INTRAMUSCULAR; INTRAVENOUS ONCE
Status: DISCONTINUED | OUTPATIENT
Start: 2021-03-17 | End: 2021-03-17

## 2021-03-17 RX ORDER — METHYLPREDNISOLONE 4 MG/1
TABLET ORAL
Qty: 21 EACH | Refills: 0 | Status: SHIPPED | OUTPATIENT
Start: 2021-03-17 | End: 2022-01-24

## 2021-03-17 RX ADMIN — ORPHENADRINE CITRATE 60 MG: 60 INJECTION INTRAMUSCULAR; INTRAVENOUS at 15:27

## 2021-03-17 RX ADMIN — DEXAMETHASONE SODIUM PHOSPHATE 10 MG: 10 INJECTION INTRAMUSCULAR; INTRAVENOUS at 15:27

## 2021-03-17 NOTE — ED PROVIDER NOTES
Subjective   Patient is a 41-year-old white male presents the emergency department with mid thoracic back pain.  He states he must of twisted it while he was working in his shop.  He denies any fall.  He states he is trying to get into see pain management.  He states he is wanting to get something for pain.  He denies any incontinence of urine or stool.  No radicular symptoms.  No other complaints.  His family member is here also to be seen for back pain      History provided by:  Patient   used: No        Review of Systems   Constitutional: Negative.    HENT: Negative.    Eyes: Negative.    Respiratory: Negative.    Cardiovascular: Negative.    Gastrointestinal: Negative.    Endocrine: Negative.    Genitourinary: Negative.    Musculoskeletal:        Patient is a 41-year-old white male presents the emergency department with mid thoracic back pain.  He states he must of twisted it while he was working in his shop.  He denies any fall.  He states he is trying to get into see pain management.  He states he is wanting to get something for pain.  He denies any incontinence of urine or stool.  No radicular symptoms.  No other complaints.  His family member is here also to be seen for back pain     Skin: Negative.    Allergic/Immunologic: Negative.    Neurological: Negative.    Hematological: Negative.    Psychiatric/Behavioral: Negative.    All other systems reviewed and are negative.      Past Medical History:   Diagnosis Date   • Back pain    • Hypertension    • Neuropathy        Allergies   Allergen Reactions   • Benadryl [Diphenhydramine] Other (See Comments)     Restless leg   • Darvon [Propoxyphene]    • Dilaudid [Hydromorphone Hcl]    • Flexeril [Cyclobenzaprine]    • Naproxen    • Nubain [Nalbuphine Hcl]    • Toradol [Ketorolac Tromethamine]    • Zanaflex [Tizanidine Hcl] Other (See Comments)     Restless Leg        Past Surgical History:   Procedure Laterality Date   • BACK SURGERY    "      History reviewed. No pertinent family history.    Social History     Socioeconomic History   • Marital status:      Spouse name: Not on file   • Number of children: Not on file   • Years of education: Not on file   • Highest education level: Not on file   Tobacco Use   • Smoking status: Current Every Day Smoker     Packs/day: 0.50     Types: Cigarettes   Substance and Sexual Activity   • Alcohol use: No   • Drug use: No   • Sexual activity: Defer       Prior to Admission medications    Medication Sig Start Date End Date Taking? Authorizing Provider   lisinopril (PRINIVIL,ZESTRIL) 10 MG tablet Take 10 mg by mouth Daily. Patient out of medication    Provider, MD Kiersten   metaxalone (SKELAXIN) 800 MG tablet Take 1 tablet by mouth 3 (Three) Times a Day As Needed for Muscle Spasms. 12/14/20   Sam Mayen PA-C       /80   Pulse 106   Temp 97.9 °F (36.6 °C) (Oral)   Resp 16   Ht 167.6 cm (66\")   Wt 99.8 kg (220 lb)   SpO2 99%   BMI 35.51 kg/m²     Objective   Physical Exam  Vitals and nursing note reviewed.   Constitutional:       Appearance: He is well-developed.      Comments: Ambulates without difficulty   HENT:      Head: Normocephalic and atraumatic.   Eyes:      Conjunctiva/sclera: Conjunctivae normal.      Pupils: Pupils are equal, round, and reactive to light.   Cardiovascular:      Rate and Rhythm: Normal rate and regular rhythm.      Heart sounds: Normal heart sounds.   Pulmonary:      Effort: Pulmonary effort is normal.      Breath sounds: Normal breath sounds.   Abdominal:      General: Bowel sounds are normal.      Palpations: Abdomen is soft.   Musculoskeletal:         General: Normal range of motion.      Cervical back: Normal range of motion and neck supple.      Comments: Tenderness on palpation to the mid thoracic paraspinal muscles.  There is no step-off or laxity noted.  DTRs are intact.   are strong and equal.  Ambulates without difficulty   Skin:     General: " Skin is warm and dry.   Neurological:      Mental Status: He is alert and oriented to person, place, and time.      Deep Tendon Reflexes: Reflexes are normal and symmetric.   Psychiatric:         Behavior: Behavior normal.         Thought Content: Thought content normal.         Judgment: Judgment normal.         Procedures         Lab Results (last 24 hours)     ** No results found for the last 24 hours. **          No orders to display       ED Course  ED Course as of Mar 17 1702   Wed Mar 17, 2021   1513 Advised the patient would give a muscle relaxer injection.  He also asked for something for pain.  Advised we would give him something nonnarcotic for pain.  He will be discharged shortly in stable condition    [CW]      ED Course User Index  [CW] Debbi Dickens, CATHIE          MDM  Number of Diagnoses or Management Options  Thoracic myofascial strain, initial encounter: minor  Patient Progress  Patient progress: stable      Final diagnoses:   Thoracic myofascial strain, initial encounter          Debbi Dickens, CATHIE  03/17/21 1702

## 2021-03-25 ENCOUNTER — APPOINTMENT (OUTPATIENT)
Dept: CT IMAGING | Facility: HOSPITAL | Age: 41
End: 2021-03-25

## 2021-03-25 ENCOUNTER — HOSPITAL ENCOUNTER (EMERGENCY)
Facility: HOSPITAL | Age: 41
Discharge: HOME | End: 2021-03-25
Attending: EMERGENCY MEDICINE | Admitting: EMERGENCY MEDICINE

## 2021-03-25 VITALS
OXYGEN SATURATION: 98 % | WEIGHT: 218 LBS | SYSTOLIC BLOOD PRESSURE: 120 MMHG | TEMPERATURE: 98.1 F | BODY MASS INDEX: 33.04 KG/M2 | HEIGHT: 68 IN | HEART RATE: 65 BPM | DIASTOLIC BLOOD PRESSURE: 54 MMHG | RESPIRATION RATE: 18 BRPM

## 2021-03-25 DIAGNOSIS — M62.830 SPASM OF MUSCLE OF LOWER BACK: Primary | ICD-10-CM

## 2021-03-25 DIAGNOSIS — S30.0XXA CONTUSION OF LOWER BACK, INITIAL ENCOUNTER: ICD-10-CM

## 2021-03-25 PROCEDURE — 25010000002 ORPHENADRINE CITRATE PER 60 MG: Performed by: EMERGENCY MEDICINE

## 2021-03-25 PROCEDURE — 72131 CT LUMBAR SPINE W/O DYE: CPT

## 2021-03-25 PROCEDURE — 25010000002 MORPHINE PER 10 MG: Performed by: EMERGENCY MEDICINE

## 2021-03-25 PROCEDURE — 99283 EMERGENCY DEPT VISIT LOW MDM: CPT

## 2021-03-25 PROCEDURE — 96372 THER/PROPH/DIAG INJ SC/IM: CPT

## 2021-03-25 RX ORDER — ORPHENADRINE CITRATE 30 MG/ML
60 INJECTION INTRAMUSCULAR; INTRAVENOUS ONCE
Status: COMPLETED | OUTPATIENT
Start: 2021-03-25 | End: 2021-03-25

## 2021-03-25 RX ORDER — MORPHINE SULFATE 10 MG/ML
8 INJECTION INTRAMUSCULAR; INTRAVENOUS; SUBCUTANEOUS ONCE
Status: COMPLETED | OUTPATIENT
Start: 2021-03-25 | End: 2021-03-25

## 2021-03-25 RX ORDER — OXYCODONE AND ACETAMINOPHEN 10; 325 MG/1; MG/1
1 TABLET ORAL ONCE
Status: COMPLETED | OUTPATIENT
Start: 2021-03-25 | End: 2021-03-25

## 2021-03-25 RX ORDER — OXYCODONE HYDROCHLORIDE AND ACETAMINOPHEN 5; 325 MG/1; MG/1
1 TABLET ORAL EVERY 6 HOURS PRN
Qty: 5 TABLET | Refills: 0 | Status: SHIPPED | OUTPATIENT
Start: 2021-03-25

## 2021-03-25 RX ADMIN — ORPHENADRINE CITRATE 60 MG: 60 INJECTION INTRAMUSCULAR; INTRAVENOUS at 05:01

## 2021-03-25 RX ADMIN — MORPHINE SULFATE 8 MG: 10 INJECTION, SOLUTION INTRAMUSCULAR; INTRAVENOUS at 05:01

## 2021-03-25 RX ADMIN — OXYCODONE HYDROCHLORIDE AND ACETAMINOPHEN 1 TABLET: 10; 325 TABLET ORAL at 06:26

## 2021-03-25 NOTE — ED PROVIDER NOTES
Subjective   42 y/o male with history of chronic back pain arrives for evaluation of lower throbbing back pain after a fall that occurred just PTA when he was in the shower and slipped. The patient has chronic back pain being seen in our ED numerous times for this. He states he is supposed to follow up with Dr. Wellington. However, he was seen here less than 1 week ago for similar. He states he is not taking pain medication at home as he is no longer with pain management. He denies any weakness, numbness or tingling, loss of sensation, saddle anesthesia, loss of bowel or bladder control. He notes the pain is worse with any type of movement without radiation.           Review of Systems   All other systems reviewed and are negative.      Past Medical History:   Diagnosis Date   • Back pain    • Hypertension    • Neuropathy        Allergies   Allergen Reactions   • Benadryl [Diphenhydramine] Other (See Comments)     Restless leg   • Darvon [Propoxyphene]    • Dilaudid [Hydromorphone Hcl]    • Flexeril [Cyclobenzaprine]    • Naproxen    • Nubain [Nalbuphine Hcl]    • Toradol [Ketorolac Tromethamine]    • Zanaflex [Tizanidine Hcl] Other (See Comments)     Restless Leg        Past Surgical History:   Procedure Laterality Date   • BACK SURGERY         History reviewed. No pertinent family history.    Social History     Socioeconomic History   • Marital status:      Spouse name: Not on file   • Number of children: Not on file   • Years of education: Not on file   • Highest education level: Not on file   Tobacco Use   • Smoking status: Current Every Day Smoker     Packs/day: 0.50     Types: Cigarettes   Substance and Sexual Activity   • Alcohol use: No   • Drug use: No   • Sexual activity: Defer           Objective   Physical Exam  Vitals and nursing note reviewed.   Constitutional:       Appearance: Normal appearance. He is normal weight.   HENT:      Head: Normocephalic and atraumatic.      Right Ear: External ear  normal.      Left Ear: External ear normal.      Nose: Nose normal.      Mouth/Throat:      Mouth: Mucous membranes are moist.      Pharynx: Oropharynx is clear.   Eyes:      Conjunctiva/sclera: Conjunctivae normal.      Pupils: Pupils are equal, round, and reactive to light.   Abdominal:      General: Abdomen is flat. Bowel sounds are normal. There is no distension.      Palpations: There is no mass.      Tenderness: There is no abdominal tenderness.      Hernia: No hernia is present.   Musculoskeletal:         General: Tenderness present. No swelling, deformity or signs of injury. Normal range of motion.      Comments: Paraspinal back tenderness bilaterally is noted, no midline tenderness, no step offs, no deformities.    Strength of quad, hamstrings, calves is 5/5     He can lift both legs from the bed without issue and hold them there.     No sensory loss    Pedal and dp pulses are intact.    Skin:     General: Skin is warm.      Capillary Refill: Capillary refill takes less than 2 seconds.   Neurological:      General: No focal deficit present.      Mental Status: He is alert and oriented to person, place, and time. Mental status is at baseline.      Cranial Nerves: No cranial nerve deficit.      Sensory: No sensory deficit.      Motor: No weakness.      Coordination: Coordination normal.      Deep Tendon Reflexes: Reflexes normal.   Psychiatric:         Mood and Affect: Mood normal.         Thought Content: Thought content normal.         Procedures           ED Course  ED Course as of Mar 25 0628   Thu Mar 25, 2021   0615 I am still awaiting on the CT to be read.     []   0624 I explained to him that his CT was unchanged. We will do some pain medication and encourage him to follow up with Dr. Wellington     []      ED Course User Index  [] Earl Rene MD      We will do a CT to rule out acute injuries    CT Lumbar Spine Without Contrast    (Results Pending)     Labs Reviewed - No data to  display                                       MDM    Final diagnoses:   Spasm of muscle of lower back   Contusion of lower back, initial encounter       ED Disposition  ED Disposition     ED Disposition Condition Comment    Discharge Stable           Savanna Conley MD Hall, Alice R, MD               Medication List      New Prescriptions    oxyCODONE-acetaminophen 5-325 MG per tablet  Commonly known as: PERCOCET  Take 1 tablet by mouth Every 6 (Six) Hours As Needed for Severe Pain .           Where to Get Your Medications      These medications were sent [place] [facility] [place] - [address] [place], [place] [address] - [phone] [facility] [phone] [address] [address]    Phone: [phone]   · oxyCODONE-acetaminophen 5-325 MG per tablet          aErl Rene MD  03/25/21 0670

## 2021-04-11 ENCOUNTER — HOSPITAL ENCOUNTER (EMERGENCY)
Age: 41
Discharge: LEFT BEFORE DISCHARGE | End: 2021-04-12
Payer: MEDICARE

## 2021-04-11 VITALS
HEIGHT: 66 IN | OXYGEN SATURATION: 98 % | WEIGHT: 200 LBS | HEART RATE: 82 BPM | RESPIRATION RATE: 20 BRPM | TEMPERATURE: 98.6 F | SYSTOLIC BLOOD PRESSURE: 167 MMHG | DIASTOLIC BLOOD PRESSURE: 100 MMHG | BODY MASS INDEX: 32.14 KG/M2

## 2021-04-11 ASSESSMENT — PAIN DESCRIPTION - ORIENTATION: ORIENTATION: LOWER

## 2021-04-11 ASSESSMENT — PAIN DESCRIPTION - DESCRIPTORS: DESCRIPTORS: ACHING

## 2021-04-11 ASSESSMENT — PAIN SCALES - GENERAL: PAINLEVEL_OUTOF10: 10

## 2021-04-11 ASSESSMENT — PAIN DESCRIPTION - LOCATION: LOCATION: BACK

## 2021-04-12 ENCOUNTER — HOSPITAL ENCOUNTER (EMERGENCY)
Facility: HOSPITAL | Age: 41
Discharge: HOME | End: 2021-04-12
Attending: EMERGENCY MEDICINE | Admitting: EMERGENCY MEDICINE

## 2021-04-12 VITALS
DIASTOLIC BLOOD PRESSURE: 105 MMHG | HEIGHT: 65 IN | WEIGHT: 222 LBS | RESPIRATION RATE: 18 BRPM | SYSTOLIC BLOOD PRESSURE: 164 MMHG | TEMPERATURE: 98.2 F | HEART RATE: 92 BPM | BODY MASS INDEX: 36.99 KG/M2 | OXYGEN SATURATION: 97 %

## 2021-04-12 DIAGNOSIS — G89.29 OTHER CHRONIC PAIN: Primary | ICD-10-CM

## 2021-04-12 PROCEDURE — 25010000002 ORPHENADRINE CITRATE PER 60 MG: Performed by: EMERGENCY MEDICINE

## 2021-04-12 PROCEDURE — 25010000002 DEXAMETHASONE PER 1 MG: Performed by: EMERGENCY MEDICINE

## 2021-04-12 PROCEDURE — 99283 EMERGENCY DEPT VISIT LOW MDM: CPT

## 2021-04-12 PROCEDURE — 96372 THER/PROPH/DIAG INJ SC/IM: CPT

## 2021-04-12 RX ORDER — DEXAMETHASONE SODIUM PHOSPHATE 10 MG/ML
10 INJECTION INTRAMUSCULAR; INTRAVENOUS ONCE
Status: COMPLETED | OUTPATIENT
Start: 2021-04-12 | End: 2021-04-12

## 2021-04-12 RX ORDER — OXYCODONE AND ACETAMINOPHEN 10; 325 MG/1; MG/1
1 TABLET ORAL ONCE
Status: COMPLETED | OUTPATIENT
Start: 2021-04-12 | End: 2021-04-12

## 2021-04-12 RX ORDER — ORPHENADRINE CITRATE 30 MG/ML
60 INJECTION INTRAMUSCULAR; INTRAVENOUS ONCE
Status: COMPLETED | OUTPATIENT
Start: 2021-04-12 | End: 2021-04-12

## 2021-04-12 RX ADMIN — DEXAMETHASONE SODIUM PHOSPHATE 10 MG: 10 INJECTION INTRAMUSCULAR; INTRAVENOUS at 05:35

## 2021-04-12 RX ADMIN — ORPHENADRINE CITRATE 60 MG: 60 INJECTION INTRAMUSCULAR; INTRAVENOUS at 05:35

## 2021-04-12 RX ADMIN — OXYCODONE HYDROCHLORIDE AND ACETAMINOPHEN 1 TABLET: 10; 325 TABLET ORAL at 05:35

## 2021-04-12 NOTE — ED PROVIDER NOTES
"Subjective   42 y/o male with history of chronic right lower back pain made worse with 'everything' without alleviating factors. He notes the pain his to his right lower back with radiation down his right leg that is similar to his previous episodes. He has been seen by myself 6 times in the last 2 years for this. He has been seen in our ED 5 times this year alone as well as several times at [facility] this year alone for his chronic pain. He has finally started to see pain (4/5 saw Dr. Arguelles) but does complain that \"he is only starting me on 5s.\" He his pain is currently at his right leg/hip which is similar to his previous presentations. He denies any saddle anesthesia, loss of bowel or bladder control, weakness, falls, trauma, fevers or chills, dysuria or hematuria. He states he is taking his pain medication as directed and denies recent injections or surgeries to his back. He tells me his pain is unchanged from previous           Review of Systems   All other systems reviewed and are negative.      Past Medical History:   Diagnosis Date   • Back pain    • Hypertension    • Neuropathy        Allergies   Allergen Reactions   • Benadryl [Diphenhydramine] Other (See Comments)     Restless leg   • Darvon [Propoxyphene]    • Dilaudid [Hydromorphone Hcl]    • Flexeril [Cyclobenzaprine]    • Naproxen    • Nubain [Nalbuphine Hcl]    • Toradol [Ketorolac Tromethamine]    • Zanaflex [Tizanidine Hcl] Other (See Comments)     Restless Leg        Past Surgical History:   Procedure Laterality Date   • BACK SURGERY         History reviewed. No pertinent family history.    Social History     Socioeconomic History   • Marital status: Other     Spouse name: Not on file   • Number of children: Not on file   • Years of education: Not on file   • Highest education level: Not on file   Tobacco Use   • Smoking status: Current Every Day Smoker     Packs/day: 0.50     Types: Cigarettes   Substance and Sexual Activity   • Alcohol use: No   • " "Drug use: No   • Sexual activity: Defer           Objective   Physical Exam  Vitals and nursing note reviewed.   Constitutional:       Appearance: Normal appearance. He is normal weight.   HENT:      Head: Normocephalic and atraumatic.      Right Ear: External ear normal.      Left Ear: External ear normal.      Nose: Nose normal.      Mouth/Throat:      Mouth: Mucous membranes are moist.      Pharynx: Oropharynx is clear.   Eyes:      Extraocular Movements: Extraocular movements intact.      Conjunctiva/sclera: Conjunctivae normal.      Pupils: Pupils are equal, round, and reactive to light.   Musculoskeletal:         General: Tenderness present. No swelling, deformity or signs of injury. Normal range of motion.      Comments: PSIS tenderness on the right, no step offs or midline tenderness    DTR are intact, sensation is intact, he is able to lift both legs from the bed without issue. Strength of quads, hamstrings, calves and flexers are 5/5   Skin:     General: Skin is warm.      Capillary Refill: Capillary refill takes less than 2 seconds.   Neurological:      General: No focal deficit present.      Mental Status: He is alert and oriented to person, place, and time.      Cranial Nerves: No cranial nerve deficit.      Sensory: No sensory deficit.      Motor: No weakness.      Deep Tendon Reflexes: Reflexes normal.   Psychiatric:         Mood and Affect: Mood normal.         Behavior: Behavior normal.         Thought Content: Thought content normal.         Procedures           ED Course  ED Course as of Apr 12 0522 Mon Apr 12, 2021   0455 5th visit to our ED alone this year for chronic pain. He was just at [facility] less than 4 hours ago for the same.     []   0516 This is chronic pain. He has no \"red flags\" to examination. I explained to him that we could not keep coming to the ED for our chronic pain to get a \"pain shot.\" He would need to continue to follow up with his pain doctor. I will provide one po 10 mg " percocet but no IM injections of this and none on DC. His yvonne has been reviewed.     [JH]      ED Course User Index  [] Earl Rene MD                                           [facility]    Final diagnoses:   Other chronic pain       ED Disposition  ED Disposition     ED Disposition Condition Comment    Discharge Stable           Savanna Conley MD               Medication List      No changes were made to your prescriptions during this visit.          Earl Rene MD  04/12/21 3543

## 2021-04-12 NOTE — ED NOTES
Patient wanting to know when he'll see the physician. Dr. Pina James notified.        Rocío Beltre RN  04/12/21 9119

## 2021-04-14 ENCOUNTER — TELEPHONE (OUTPATIENT)
Dept: PSYCHIATRY | Age: 41
End: 2021-04-14

## 2021-04-14 NOTE — TELEPHONE ENCOUNTER
Called pt to check on them because they were a no show for there appt. Pt stated that he totally forgot about that today. Rescheduled for 4/29.     Electronically signed by Johanna Delgadillo MA on 4/14/2021 at 11:45 AM

## 2021-04-28 ENCOUNTER — APPOINTMENT (OUTPATIENT)
Dept: CT IMAGING | Facility: HOSPITAL | Age: 41
End: 2021-04-28

## 2021-04-28 ENCOUNTER — HOSPITAL ENCOUNTER (EMERGENCY)
Facility: HOSPITAL | Age: 41
Discharge: HOME | End: 2021-04-28
Attending: EMERGENCY MEDICINE | Admitting: EMERGENCY MEDICINE

## 2021-04-28 VITALS
WEIGHT: 226 LBS | HEART RATE: 80 BPM | RESPIRATION RATE: 20 BRPM | BODY MASS INDEX: 37.65 KG/M2 | HEIGHT: 65 IN | SYSTOLIC BLOOD PRESSURE: 150 MMHG | OXYGEN SATURATION: 97 % | DIASTOLIC BLOOD PRESSURE: 99 MMHG | TEMPERATURE: 98 F

## 2021-04-28 DIAGNOSIS — S20.221A CONTUSION OF RIGHT SIDE OF BACK, INITIAL ENCOUNTER: Primary | ICD-10-CM

## 2021-04-28 PROCEDURE — 96372 THER/PROPH/DIAG INJ SC/IM: CPT

## 2021-04-28 PROCEDURE — 74176 CT ABD & PELVIS W/O CONTRAST: CPT

## 2021-04-28 PROCEDURE — 72131 CT LUMBAR SPINE W/O DYE: CPT

## 2021-04-28 PROCEDURE — 99283 EMERGENCY DEPT VISIT LOW MDM: CPT

## 2021-04-28 PROCEDURE — 25010000002 ORPHENADRINE CITRATE PER 60 MG: Performed by: EMERGENCY MEDICINE

## 2021-04-28 RX ORDER — OXYCODONE AND ACETAMINOPHEN 10; 325 MG/1; MG/1
1 TABLET ORAL ONCE
Status: COMPLETED | OUTPATIENT
Start: 2021-04-28 | End: 2021-04-28

## 2021-04-28 RX ORDER — ORPHENADRINE CITRATE 30 MG/ML
60 INJECTION INTRAMUSCULAR; INTRAVENOUS ONCE
Status: COMPLETED | OUTPATIENT
Start: 2021-04-28 | End: 2021-04-28

## 2021-04-28 RX ORDER — HYDROCODONE BITARTRATE AND ACETAMINOPHEN 5; 325 MG/1; MG/1
1 TABLET ORAL EVERY 12 HOURS PRN
COMMUNITY

## 2021-04-28 RX ADMIN — ORPHENADRINE CITRATE 60 MG: 30 INJECTION INTRAMUSCULAR; INTRAVENOUS at 20:31

## 2021-04-28 RX ADMIN — OXYCODONE HYDROCHLORIDE AND ACETAMINOPHEN 1 TABLET: 10; 325 TABLET ORAL at 20:31

## 2021-05-10 ENCOUNTER — HOSPITAL ENCOUNTER (EMERGENCY)
Facility: HOSPITAL | Age: 41
Discharge: HOME | End: 2021-05-10
Attending: EMERGENCY MEDICINE | Admitting: EMERGENCY MEDICINE

## 2021-05-10 VITALS
WEIGHT: 228 LBS | BODY MASS INDEX: 36.64 KG/M2 | SYSTOLIC BLOOD PRESSURE: 165 MMHG | TEMPERATURE: 98.6 F | RESPIRATION RATE: 18 BRPM | HEART RATE: 89 BPM | DIASTOLIC BLOOD PRESSURE: 100 MMHG | OXYGEN SATURATION: 100 % | HEIGHT: 66 IN

## 2021-05-10 DIAGNOSIS — K02.9 DENTAL CARIES: Primary | ICD-10-CM

## 2021-05-10 PROCEDURE — 25010000002 MORPHINE PER 10 MG: Performed by: EMERGENCY MEDICINE

## 2021-05-10 PROCEDURE — 99283 EMERGENCY DEPT VISIT LOW MDM: CPT

## 2021-05-10 PROCEDURE — 96372 THER/PROPH/DIAG INJ SC/IM: CPT

## 2021-05-10 RX ORDER — PENICILLIN V POTASSIUM 500 MG/1
500 TABLET ORAL 4 TIMES DAILY
Qty: 28 TABLET | Refills: 0 | Status: SHIPPED | OUTPATIENT
Start: 2021-05-10

## 2021-05-10 RX ORDER — MORPHINE SULFATE 10 MG/ML
8 INJECTION INTRAMUSCULAR; INTRAVENOUS; SUBCUTANEOUS ONCE
Status: COMPLETED | OUTPATIENT
Start: 2021-05-10 | End: 2021-05-10

## 2021-05-10 RX ORDER — GABAPENTIN 300 MG/1
300 CAPSULE ORAL 3 TIMES DAILY
COMMUNITY

## 2021-05-10 RX ADMIN — MORPHINE SULFATE 8 MG: 10 INJECTION, SOLUTION INTRAMUSCULAR; INTRAVENOUS at 02:27

## 2021-05-29 ENCOUNTER — HOSPITAL ENCOUNTER (EMERGENCY)
Facility: HOSPITAL | Age: 41
Discharge: LEFT BEFORE DISCHARGE | End: 2021-05-29

## 2021-05-29 PROCEDURE — 99211 OFF/OP EST MAY X REQ PHY/QHP: CPT

## 2021-06-29 ENCOUNTER — CARE COORDINATION (OUTPATIENT)
Dept: CARE COORDINATION | Age: 41
End: 2021-06-29

## 2021-06-29 ENCOUNTER — OFFICE VISIT (OUTPATIENT)
Dept: PRIMARY CARE CLINIC | Age: 41
End: 2021-06-29
Payer: MEDICARE

## 2021-06-29 VITALS
HEIGHT: 66 IN | TEMPERATURE: 97.8 F | WEIGHT: 224 LBS | DIASTOLIC BLOOD PRESSURE: 122 MMHG | HEART RATE: 94 BPM | OXYGEN SATURATION: 99 % | SYSTOLIC BLOOD PRESSURE: 162 MMHG | BODY MASS INDEX: 36 KG/M2

## 2021-06-29 DIAGNOSIS — I10 ESSENTIAL HYPERTENSION: ICD-10-CM

## 2021-06-29 DIAGNOSIS — F31.9 BIPOLAR DEPRESSION (HCC): ICD-10-CM

## 2021-06-29 DIAGNOSIS — Z00.00 ROUTINE GENERAL MEDICAL EXAMINATION AT A HEALTH CARE FACILITY: Primary | ICD-10-CM

## 2021-06-29 DIAGNOSIS — Z13.1 DIABETES MELLITUS SCREENING: ICD-10-CM

## 2021-06-29 DIAGNOSIS — K04.7 DENTAL ABSCESS: ICD-10-CM

## 2021-06-29 LAB — HBA1C MFR BLD: 5.5 %

## 2021-06-29 PROCEDURE — 4004F PT TOBACCO SCREEN RCVD TLK: CPT | Performed by: FAMILY MEDICINE

## 2021-06-29 PROCEDURE — 99213 OFFICE O/P EST LOW 20 MIN: CPT | Performed by: FAMILY MEDICINE

## 2021-06-29 PROCEDURE — G8427 DOCREV CUR MEDS BY ELIG CLIN: HCPCS | Performed by: FAMILY MEDICINE

## 2021-06-29 PROCEDURE — G0439 PPPS, SUBSEQ VISIT: HCPCS | Performed by: FAMILY MEDICINE

## 2021-06-29 PROCEDURE — 83036 HEMOGLOBIN GLYCOSYLATED A1C: CPT | Performed by: FAMILY MEDICINE

## 2021-06-29 PROCEDURE — G8417 CALC BMI ABV UP PARAM F/U: HCPCS | Performed by: FAMILY MEDICINE

## 2021-06-29 RX ORDER — PROPRANOLOL HYDROCHLORIDE 10 MG/1
10 TABLET ORAL 3 TIMES DAILY
Qty: 90 TABLET | Refills: 1 | Status: SHIPPED | OUTPATIENT
Start: 2021-06-29 | End: 2021-08-25

## 2021-06-29 RX ORDER — GABAPENTIN 300 MG/1
300 CAPSULE ORAL 3 TIMES DAILY
COMMUNITY

## 2021-06-29 RX ORDER — HYDROCODONE BITARTRATE AND ACETAMINOPHEN 5; 325 MG/1; MG/1
1 TABLET ORAL EVERY 12 HOURS PRN
COMMUNITY

## 2021-06-29 RX ORDER — DIVALPROEX SODIUM 250 MG/1
250 TABLET, DELAYED RELEASE ORAL 2 TIMES DAILY
Qty: 180 TABLET | Refills: 3 | Status: SHIPPED | OUTPATIENT
Start: 2021-06-29

## 2021-06-29 RX ORDER — AMOXICILLIN AND CLAVULANATE POTASSIUM 875; 125 MG/1; MG/1
1 TABLET, FILM COATED ORAL 2 TIMES DAILY
Qty: 20 TABLET | Refills: 0 | Status: SHIPPED | OUTPATIENT
Start: 2021-06-29 | End: 2021-07-09

## 2021-06-29 ASSESSMENT — LIFESTYLE VARIABLES: HOW OFTEN DO YOU HAVE A DRINK CONTAINING ALCOHOL: 0

## 2021-06-29 ASSESSMENT — PATIENT HEALTH QUESTIONNAIRE - PHQ9
1. LITTLE INTEREST OR PLEASURE IN DOING THINGS: 0
SUM OF ALL RESPONSES TO PHQ QUESTIONS 1-9: 1
SUM OF ALL RESPONSES TO PHQ QUESTIONS 1-9: 1
2. FEELING DOWN, DEPRESSED OR HOPELESS: 1
SUM OF ALL RESPONSES TO PHQ QUESTIONS 1-9: 1
SUM OF ALL RESPONSES TO PHQ9 QUESTIONS 1 & 2: 1

## 2021-06-29 NOTE — LETTER
[id number] [facility], [address], [place]    Phone [phone]    [address] Worker for Waqar Zuluaga MD        August 25, 2021    [address] [address] [place] [address]      Dear Mikey Gautam:    The request for dental treatment was presented to the [address] for payment of services to be performed by Dr Zoraida Flores, Oral Surgeon. After careful examination and discussions, it was determined that the request is denied. The [facility] has a dental clinic at [facility]). The phone number is [phone]. They are currently scheduling appointments for service through October 2021. I do not know what their cash pricing is, but it would be worth calling to ask about extraction costs. Also, Medicare Open Enrollment begins October 15th through December 7th, 2021 for 2022 benefits. You may be able to find a Medicare Advantage plan that offers Comprehensive Dental coverage. This plan may be able to help cover extraction costs. Please contact the [facility] [phone] after Open Enrollment begins to speak with someone about enrolling in a Medicare Advantage plan. If you have any questions or concerns, please don't hesitate to call. I am working from home during the pandemic. My work cell is [phone].     Sincerely,    Lisa Bazan (Electronically Signed)

## 2021-06-29 NOTE — CARE COORDINATION
dental issues -  Received: Today  MD Lars Ward  Likely needs major work and has insurance issues, can we help assist in finding a program. Thanks!      Submitted by Fidel/JUAN

## 2021-06-29 NOTE — PROGRESS NOTES
phe Medicare Annual Wellness Visit  Name: Ricky Mittal Date: 2021   MRN: [id number] Sex: Male   Age: 39 y.o. Ethnicity: Non-/Non    : 1980 Race: Josette Garcia is here for Medicare AWV    Screenings for behavioral, psychosocial and functional/safety risks, and cognitive dysfunction are all negative except as indicated below. These results, as well as other patient data from the [address] form, are documented in Flowsheets linked to this Encounter. Allergies   Allergen Reactions    Flexeril [Cyclobenzaprine] Itching    Toradol [Ketorolac Tromethamine] Itching     itching    Demerol Hcl [Meperidine] Rash    Other Rash     darvocet         Prior to Visit Medications    Medication Sig Taking? Authorizing Provider   promethazine (PHENERGAN) 25 MG tablet Take 1 tablet by mouth every 6 hours as needed for Nausea Yes Kathleen Fajardo MD   gabapentin (NEURONTIN) 300 MG capsule Take 300 mg by mouth 3 times daily. Yes Historical Provider, MD   HYDROcodone-acetaminophen (NORCO) 5-325 MG per tablet Take 1 tablet by mouth every 12 hours as needed.  Yes Historical Provider, MD   amoxicillin-clavulanate (AUGMENTIN) 875-125 MG per tablet Take 1 tablet by mouth 2 times daily for 10 days Yes Kathleen Fajardo MD   divalproex (DEPAKOTE) 250 MG DR tablet Take 1 tablet by mouth 2 times daily Yes Kathleen Fajardo MD   propranolol (INDERAL) 10 MG tablet Take 1 tablet by mouth 3 times daily 1 tab twice daily for 5 days then 3 times daily Yes Kathleen Fajardo MD   lisinopril (PRINIVIL;ZESTRIL) 10 MG tablet Take 10 mg by mouth daily Yes Historical Provider, MD         Past Medical History:   Diagnosis Date    Back pain     Chest pain     Scoliosis     Tobacco abuse     Vitamin D deficiency        Past Surgical History:   Procedure Laterality Date    BACK SURGERY      x 3          Family History   Problem Relation Age of Onset    Heart Disease Mother     Hypertension Mother    Natalia James Cancer Father         prostate       CareTeam (Including outside providers/suppliers regularly involved in providing care):   Patient Care Team:  Misty Cota MD as PCP - General (Family Medicine)  Misty Cota MD as PCP - [facility]  Mya Jones MD as Consulting Physician (Cardiology)  Isabelle Tucker MD as Consulting Physician (Anesthesiology)  Sai Saenz as Health     [facility] Readings from Last 3 Encounters:   06/29/21 224 lb (101.6 kg)   04/11/21 200 lb (90.7 kg)   10/21/20 215 lb (97.5 kg)     Vitals:    06/29/21 1135   BP: (!) 162/122   Pulse: 94   Temp: 97.8 °F (36.6 °C)   TempSrc: Temporal   SpO2: 99%   Weight: 224 lb (101.6 kg)   Height: 5' 6\" (1.676 m)     Body mass index is 36.15 kg/m². Based upon direct observation of the patient, evaluation of cognition reveals recent and remote memory intact. Patient's complete Health Risk Assessment and screening values have been reviewed and are found in Flowsheets. The following problems were reviewed today and where indicated follow up appointments were made and/or referrals ordered. Positive Risk Factor Screenings with Interventions:     Fall Risk:  Timed Up and Go Test > 12 seconds? (Complete if either Fall Risk answers are Yes): no  2 or more falls in past year?: (!) yes  Fall with injury in past year?: no  Fall Risk Interventions:    · Home safety tips provided       Substance History:  Social History     Tobacco History     Smoking Status  Current Every Day Smoker Smoking Frequency  0.5 packs/day    Smokeless Tobacco Use  Never Used          Alcohol History     Alcohol Use Status  No          Drug Use     Drug Use Status  No          Sexual Activity     Sexually Active  Not Asked               Alcohol Screening:       A score of 8 or more is associated with harmful or hazardous drinking. A score of 13 or more in women, and 15 or more in men, is likely to indicate alcohol dependence.   Substance Abuse Interventions:  · Tobacco abuse:  tobacco cessation tips and resources provided, provider spent 7 minutes counseling patient    General Health and ACP:  General  In general, how would you say your health is?: Fair  In the past 7 days, have you experienced any of the following?  New or Increased Pain, New or Increased Fatigue, Loneliness, Social Isolation, Stress or Anger?: (!) Stress  Do you get the social and emotional support that you need?: Yes  Do you have a Living Will?: (!) No  Advance Directives     Power of  Living Will ACP-Advance Directive ACP-Power of     Not on File Not on File Not on File Not on File      General Health Risk Interventions:  · No Living Will: Advance Care Planning addressed with patient today    Health Habits/Nutrition:  Health Habits/Nutrition  Do you exercise for at least 20 minutes 2-3 times per week?: Yes  Have you lost any weight without trying in the past 3 months?: No  Do you eat only one meal per day?: No  Have you seen the dentist within the past year?: (!) No  Body mass index: (!) 36.15  Health Habits/Nutrition Interventions:  · Nutritional issues:  educational materials for healthy, well-balanced diet provided  · Dental exam overdue:  patient encouraged to make appointment with his/her dentist       Personalized Preventive Plan   Current Health Maintenance Status  Immunization History   Administered Date(s) Administered    Tdap (Boostrix, Adacel) 07/01/2017        Health Maintenance   Topic Date Due    Hepatitis C screen  Never done    Varicella vaccine (1 of 2 - 2-dose childhood series) Never done    Pneumococcal 0-64 years Vaccine (1 of 2 - PPSV23) Never done    COVID-19 Vaccine (1) Never done    HIV screen  Never done   [employer] Visit (AWV)  Never done    Lipid screen  Never done    Flu vaccine (Season Ended) 09/01/2021    Potassium monitoring  09/17/2021    Creatinine monitoring  09/17/2021    Diabetes screen  06/29/2024    DTaP/Tdap/Td vaccine (2 - Td or Tdap) 07/01/2027    Hepatitis A vaccine  Aged Out    Hepatitis B vaccine  Aged Out    Hib vaccine  Aged Out    Meningococcal (ACWY) vaccine  Aged Out     Recommendations for [employer] Due: see orders and patient instructions/AVS.  . Recommended screening schedule for the next 5-10 years is provided to the patient in written form: see Patient Susana Chavira was seen today for medicare aw. Diagnoses and all orders for this visit:    Routine general medical examination at a health care facility    Dental abscess    Bipolar depression [facility])    Essential hypertension    Diabetes mellitus screening  -     POCT glycosylated hemoglobin (Hb A1C)    Other orders  -     amoxicillin-clavulanate (AUGMENTIN) 875-125 MG per tablet; Take 1 tablet by mouth 2 times daily for 10 days  -     divalproex (DEPAKOTE) 250 MG DR tablet; Take 1 tablet by mouth 2 times daily  -     propranolol (INDERAL) 10 MG tablet; Take 1 tablet by mouth 3 times daily 1 tab twice daily for 5 days then 3 times daily  -     promethazine (PHENERGAN) 25 MG tablet;  Take 1 tablet by mouth every 6 hours as needed for Nausea

## 2021-06-30 RX ORDER — PROMETHAZINE HYDROCHLORIDE 25 MG/1
25 TABLET ORAL EVERY 6 HOURS PRN
Qty: 20 TABLET | Refills: 0 | Status: SHIPPED | OUTPATIENT
Start: 2021-06-30 | End: 2021-07-07

## 2021-06-30 ASSESSMENT — ENCOUNTER SYMPTOMS
BACK PAIN: 1
DIARRHEA: 0
ABDOMINAL PAIN: 0
SHORTNESS OF BREATH: 0
EYE ITCHING: 0
SORE THROAT: 0
NAUSEA: 1
RHINORRHEA: 0
COUGH: 0
COLOR CHANGE: 0
CONSTIPATION: 0

## 2021-06-30 NOTE — CARE COORDINATION
Juanito Guillen,  for Oral & Facial Surgery of [address] [place] [address] (Dr Bk Baker). Asked if Dr Bk Baker [facility]. She stated he will. Provided her with Javi's information. Told her I will call the patient to have him call her to schedule an appointment. She verbalized understanding. Submitted by Fidel/JUAN Suárez. Explained that he will need to see Dr Bk Baker to have an evaluation of is dental needs. Once that is done, Dr Gama Shook office will send me a quote for dental treatment. I will ask the [facility] to pay for his treatment. He verbalized understanding. Gave him Ivette's name and phone number. I asked him to have Carissa Villalta help him with the call.       Submitted by Fidel/JUAN

## 2021-07-01 NOTE — PROGRESS NOTES
for adenopathy. Does not bruise/bleed easily. Psychiatric/Behavioral: Negative for dysphoric mood, self-injury and suicidal ideas. The patient is nervous/anxious. Past Medical History:   Diagnosis Date    Back pain     Chest pain     Scoliosis     Tobacco abuse     Vitamin D deficiency        Current Outpatient Medications   Medication Sig Dispense Refill    promethazine (PHENERGAN) 25 MG tablet Take 1 tablet by mouth every 6 hours as needed for Nausea 20 tablet 0    gabapentin (NEURONTIN) 300 MG capsule Take 300 mg by mouth 3 times daily.  HYDROcodone-acetaminophen (NORCO) 5-325 MG per tablet Take 1 tablet by mouth every 12 hours as needed.  amoxicillin-clavulanate (AUGMENTIN) 875-125 MG per tablet Take 1 tablet by mouth 2 times daily for 10 days 20 tablet 0    divalproex (DEPAKOTE) 250 MG DR tablet Take 1 tablet by mouth 2 times daily 180 tablet 3    propranolol (INDERAL) 10 MG tablet Take 1 tablet by mouth 3 times daily 1 tab twice daily for 5 days then 3 times daily 90 tablet 1    lisinopril (PRINIVIL;ZESTRIL) 10 MG tablet Take 10 mg by mouth daily       No current facility-administered medications for this visit.        Allergies   Allergen Reactions    Flexeril [Cyclobenzaprine] Itching    Toradol [Ketorolac Tromethamine] Itching     itching    Demerol Hcl [Meperidine] Rash    Other Rash     darvocet       Past Surgical History:   Procedure Laterality Date    BACK SURGERY      x 3        Social History     Tobacco Use    Smoking status: Current Every Day Smoker     Packs/day: 0.50    Smokeless tobacco: Never Used   Vaping Use    Vaping Use: Never used   Substance Use Topics    Alcohol use: No    Drug use: No       Family History   Problem Relation Age of Onset    Heart Disease Mother     Hypertension Mother     Cancer Father         prostate       BP (!) 162/122   Pulse 94   Temp 97.8 °F (36.6 °C) (Temporal)   Ht 5' 6\" (1.676 m)   Wt 224 lb (101.6 kg)   SpO2 99% BMI 36.15 kg/m²     Physical Exam  Vitals and nursing note reviewed. Constitutional:       General: He is not in acute distress. Appearance: He is well-developed. He is not toxic-appearing or diaphoretic. HENT:      Head: Normocephalic and atraumatic. Not macrocephalic and not microcephalic. Right Ear: External ear normal. No decreased hearing noted. No drainage. Left Ear: External ear normal. No decreased hearing noted. No drainage. Nose: Nose normal. No nasal deformity or rhinorrhea. Mouth/Throat:      Mouth: Mucous membranes are not pale and not dry. No injury or oral lesions. Dentition: Abnormal dentition. Dental tenderness and dental caries present. Pharynx: Uvula midline. Eyes:      General: Lids are normal. No scleral icterus. Right eye: No discharge. Left eye: No discharge. Conjunctiva/sclera: Conjunctivae normal.      Pupils: Pupils are equal, round, and reactive to light. Neck:      Thyroid: No thyromegaly. Trachea: Phonation normal. No tracheal deviation. Cardiovascular:      Rate and Rhythm: Normal rate and regular rhythm. No extrasystoles are present. Heart sounds: Normal heart sounds, S1 normal and S2 normal. No murmur heard. Pulmonary:      Effort: Pulmonary effort is normal. No respiratory distress. Breath sounds: Normal breath sounds. No wheezing, rhonchi or rales. Abdominal:      General: Bowel sounds are normal. There is no distension. Palpations: Abdomen is soft. Tenderness: There is no abdominal tenderness. There is no guarding. Comments: Central obesity   Musculoskeletal:         General: No tenderness. Normal range of motion. Cervical back: Normal range of motion and neck supple. No tenderness or bony tenderness. Thoracic back: Normal. No tenderness or bony tenderness. Lumbar back: Normal. No tenderness or bony tenderness. Right lower leg: No swelling. No edema.       Left lower leg: No swelling. No edema. Lymphadenopathy:      Head:      Right side of head: No submental, submandibular or tonsillar adenopathy. Left side of head: No submental, submandibular or tonsillar adenopathy. Cervical: No cervical adenopathy. Upper Body:      Right upper body: No supraclavicular adenopathy. Left upper body: No supraclavicular adenopathy. Skin:     General: Skin is dry. Coloration: Skin is not pale. Findings: No erythema (on head, neck, face, extremities) or rash (on extremities, head, neck, face). Nails: There is no clubbing. Neurological:      Mental Status: He is alert and oriented to person, place, and time. Motor: No tremor or seizure activity. Gait: Gait normal.      Deep Tendon Reflexes: Reflexes are normal and symmetric. Psychiatric:         Mood and Affect: Mood is anxious. Mood is not depressed. Speech: Speech normal.         Behavior: Behavior normal.         Thought Content: Thought content normal. Thought content does not include homicidal or suicidal ideation. Judgment: Judgment normal.         Assessment:    ICD-10-CM    1. Routine general medical examination at a health care facility  Z00.00    2. Dental abscess  K04.7    3. [facility]  F31.9    4. Essential hypertension  I10    5. Diabetes mellitus screening  Z13.1 POCT glycosylated hemoglobin (Hb A1C)       Plan:   I have read her to help in regards to finding him some dental care. Antibiotic prescribed as well as promethazine. Start valproic acid based on the agitation that he does have. He denies it may impact amphetamine abuse but this could be part of if he has used any amphetamines recently. Hypertension uncontrolled and also having some migraines and we will start propanolol for this as well.   Orders Placed This Encounter   Procedures    POCT glycosylated hemoglobin (Hb A1C)     Orders Placed This Encounter   Medications    Reading on Aging online. · You need 6939-1652 mg of calcium and 8759-2326 IU of vitamin D per day. It is possible to meet your calcium requirement with diet alone, but a vitamin D supplement is usually necessary to meet this goal.  · When exposed to the sun, use a sunscreen that protects against both UVA and UVB radiation with an SPF of 30 or greater. Reapply every 2 to 3 hours or after sweating, drying off with a towel, or swimming. · Always wear a seat belt when traveling in a car. Always wear a helmet when riding a bicycle or motorcycle. Patient given educational handouts and has had all questions answered. Patient voices understanding and agrees to plans along with risks and benefits of plan. Patient isinstructed to continue prior meds, diet, and exercise plans unless instructed otherwise. Patient agrees to follow up as instructed and sooner if needed. Patient agrees to go to ER if condition becomes emergent. Notesmay be completed with dictation device and spelling errors may occur. Materials may be copied and pasted from a notepad outside of EMR, all of which, I, Dr. Nury Murillo MD, take sole intellectual ownership of and have approved adding to my note. Return in about 1 year (around 6/29/2022) for  ([facility]), medicare AWV 2 time slots.

## 2021-07-19 ENCOUNTER — HOSPITAL ENCOUNTER (EMERGENCY)
Facility: HOSPITAL | Age: 41
Discharge: HOME | End: 2021-07-19
Attending: EMERGENCY MEDICINE | Admitting: EMERGENCY MEDICINE

## 2021-07-19 VITALS
OXYGEN SATURATION: 100 % | HEART RATE: 95 BPM | SYSTOLIC BLOOD PRESSURE: 155 MMHG | DIASTOLIC BLOOD PRESSURE: 104 MMHG | RESPIRATION RATE: 20 BRPM | WEIGHT: 225 LBS | HEIGHT: 66 IN | TEMPERATURE: 97.9 F | BODY MASS INDEX: 36.16 KG/M2

## 2021-07-19 DIAGNOSIS — M54.50 CHRONIC MIDLINE LOW BACK PAIN WITHOUT SCIATICA: Primary | ICD-10-CM

## 2021-07-19 DIAGNOSIS — G89.29 CHRONIC MIDLINE LOW BACK PAIN WITHOUT SCIATICA: Primary | ICD-10-CM

## 2021-07-19 PROCEDURE — 99282 EMERGENCY DEPT VISIT SF MDM: CPT

## 2021-07-20 NOTE — ED PROVIDER NOTES
Subjective   Patient complains of uncontrolled chronic back pain.  He says he has had 3 back surgeries.  201 were done here in the last months but has had no recent surgeries.  He said he has chronic back pain and sees pain management for that is normally on medication but he had to cancel his last appointment pain management because he was sick and he does not see them again until later this month.  Therefore he is out of his medication.  He denies any new injury or fall.  He said he just needs some relief from the pain for short-term at least.  When I asked him why he did not see his regular physician since he is out of his medication and does not able to see pain management he says that would be consider doctor shopping to get medication from somewhere else on.  Not told the same thing with applied to us also.      History provided by:  Patient   used: No    Back Pain  Location:  Lumbar spine  Quality:  Aching  Radiates to:  Does not radiate  Pain severity:  Severe  Pain is:  Same all the time  Onset quality:  Gradual  Timing:  Constant  Progression:  Unchanged  Chronicity:  Chronic  Context: not emotional stress, not falling, not jumping from heights, not lifting heavy objects, not MCA, not MVA, not occupational injury, not pedestrian accident, not physical stress, not recent illness, not recent injury and not twisting    Relieved by:  Nothing  Worsened by:  Nothing  Ineffective treatments:  None tried  Associated symptoms: no abdominal pain, no abdominal swelling, no bladder incontinence, no bowel incontinence, no chest pain, no dysuria, no fever, no headaches, no leg pain, no numbness, no paresthesias, no pelvic pain, no perianal numbness, no tingling, no weakness and no weight loss    Risk factors: no hx of cancer, no hx of osteoporosis, no lack of exercise, no menopause, not obese, not pregnant, no recent surgery, no steroid use and no vascular disease        Review of Systems    Constitutional: Negative.  Negative for fever and weight loss.   HENT: Negative.    Respiratory: Negative.    Cardiovascular: Negative.  Negative for chest pain.   Gastrointestinal: Negative.  Negative for abdominal pain and bowel incontinence.   Genitourinary: Negative.  Negative for bladder incontinence, dysuria and pelvic pain.   Musculoskeletal: Positive for back pain.   Skin: Negative.    Neurological: Negative.  Negative for tingling, weakness, numbness, headaches and paresthesias.   Psychiatric/Behavioral: Negative.    All other systems reviewed and are negative.      Past Medical History:   Diagnosis Date   • Back pain    • Hypertension    • Neuropathy        Allergies   Allergen Reactions   • Benadryl [Diphenhydramine] Other (See Comments)     Restless leg   • Darvon [Propoxyphene]    • Dilaudid [Hydromorphone Hcl]    • Flexeril [Cyclobenzaprine]    • Naproxen    • Nubain [Nalbuphine Hcl]    • Toradol [Ketorolac Tromethamine]    • Zanaflex [Tizanidine Hcl] Other (See Comments)     Restless Leg        Past Surgical History:   Procedure Laterality Date   • BACK SURGERY         History reviewed. No pertinent family history.    Social History     Socioeconomic History   • Marital status: Single     Spouse name: Not on file   • Number of children: Not on file   • Years of education: Not on file   • Highest education level: Not on file   Tobacco Use   • Smoking status: Current Every Day Smoker     Packs/day: 0.50     Types: Cigarettes   • Smokeless tobacco: Never Used   Substance and Sexual Activity   • Alcohol use: No   • Drug use: No   • Sexual activity: Defer           Objective   Physical Exam  Vitals and nursing note reviewed.   Musculoskeletal:         General: Tenderness present. Normal range of motion.      Comments: Patient is tender to palpation in the lumbosacral region of his back.  There is no deformity noted.   Neurological:      General: No focal deficit present.      Mental Status: He is  oriented to person, place, and time.   Psychiatric:         Mood and Affect: Mood normal.         Behavior: Behavior normal.         Procedures           ED Course  ED Course as of Jul 19 1957   Mon Jul 19, 2021 1956 I told the patient that we will be glad to give anything nonnarcotic or noncontrolled substance to help with his pain.  Offered Toradol and/or steroids but he said they would not help.  He says muscle relaxers make him worse.  I explained to him that we cannot have the ER as the backup pain control center when he is out of his medication.  This is his sixth visit to the emergency room for back pain this year amongst the other ER visits also.  He says he was unaware of that.  He says he did not go to his family doctor because he thought that would be doctor shopping so he came here instead.  I told him that would be the same role with a plan to us.  In fairness he did not asked for a prescription but just for something to get him some relief tonight but I told him we cannot have that as the regular occurrence that it seems to be occurring for him now.  He says he would just go home and suffer.    [TR]      ED Course User Index  [TR] Henri Chowdary Jr., MD                                           MDM  Number of Diagnoses or Management Options  Chronic midline low back pain without sciatica: established and worsening  Risk of Complications, Morbidity, and/or Mortality  Presenting problems: moderate  Diagnostic procedures: minimal  Management options: low    Patient Progress  Patient progress: stable      Final diagnoses:   Chronic midline low back pain without sciatica       ED Disposition  ED Disposition     ED Disposition Condition Comment    Discharge Stable           Diogo Arguelles,   [address]  [place] [address]  [phone]    Call            Medication List      No changes were made to your prescriptions during this visit.          Henri Chowdary Jr.,  MD  07/19/21 1957

## 2021-08-09 NOTE — CARE COORDINATION
Shin Martinez returned my call. Shared issue with pricing. She recalculated cost.  She discounted treatment to $4,990. I asked for a new treatment plan with cost.  She stated she will email it to me. Submitted by [facility]/W    Email received from Shin Martinez with new treatment plan and cost:    Marta Manrique,     Please find attached the treatment plan and cost for [facility].     Total Treatment Plan -  $5,388. Total Discount - $1,567     Total Due - $4,990     Thank you! Solomon Bolton by [facility]/W    Email sent to [address] Advisory Committee for review and response.     Submitted by NSMichriser/CHW

## 2021-08-09 NOTE — CARE COORDINATION
LATE ENTRY:    Received an email from Tien Hood,  for Dr Zofia Marquez, Oral Surgeon, regarding estimate for [address] treatment:    Marissa Expose was in on July 8th for his consult. Attached you will find consult and treatment plan. We are not charging for the consult and x-ray totaling $193. Total treatment plan is $7,300 but with 20% discount it will be $5840. Total due - $5840. Just let me know if you approve and we will get him scheduled? ? Thank you,  Gael Sampson by Fidel/JUAN    Contacted Randi Ochoa, President [facility]. Shared cost of treatment plan. Petty is beyond her approval.  Will need to be present to the [employer]. Doubtful it will be approved. Told Rut Steiner I will contact Chandan Cox about getting a new treatment plan for teeth that are major concern or for the initial eval and xrays.     Submitted by Fidel/JUAN

## 2021-08-17 ENCOUNTER — HOSPITAL ENCOUNTER (EMERGENCY)
Age: 41
Discharge: HOME | End: 2021-08-17
Payer: MEDICARE

## 2021-08-17 ENCOUNTER — APPOINTMENT (OUTPATIENT)
Dept: GENERAL RADIOLOGY | Age: 41
End: 2021-08-17
Payer: MEDICARE

## 2021-08-17 VITALS
RESPIRATION RATE: 18 BRPM | DIASTOLIC BLOOD PRESSURE: 98 MMHG | OXYGEN SATURATION: 98 % | HEART RATE: 80 BPM | TEMPERATURE: 98.3 F | BODY MASS INDEX: 35.67 KG/M2 | SYSTOLIC BLOOD PRESSURE: 156 MMHG | WEIGHT: 221 LBS

## 2021-08-17 DIAGNOSIS — M54.50 ACUTE EXACERBATION OF CHRONIC LOW BACK PAIN: Primary | ICD-10-CM

## 2021-08-17 DIAGNOSIS — G89.29 ACUTE EXACERBATION OF CHRONIC LOW BACK PAIN: Primary | ICD-10-CM

## 2021-08-17 PROCEDURE — 72100 X-RAY EXAM L-S SPINE 2/3 VWS: CPT

## 2021-08-17 PROCEDURE — 72070 X-RAY EXAM THORAC SPINE 2VWS: CPT

## 2021-08-17 PROCEDURE — 99283 EMERGENCY DEPT VISIT LOW MDM: CPT

## 2021-08-17 PROCEDURE — 6370000000 HC RX 637 (ALT 250 FOR IP): Performed by: NURSE PRACTITIONER

## 2021-08-17 RX ORDER — OXYCODONE AND ACETAMINOPHEN 10; 325 MG/1; MG/1
1 TABLET ORAL ONCE
Status: COMPLETED | OUTPATIENT
Start: 2021-08-17 | End: 2021-08-17

## 2021-08-17 RX ADMIN — OXYCODONE HYDROCHLORIDE AND ACETAMINOPHEN 1 TABLET: 10; 325 TABLET ORAL at 12:10

## 2021-08-17 ASSESSMENT — PAIN SCALES - GENERAL
PAINLEVEL_OUTOF10: 10
PAINLEVEL_OUTOF10: 7

## 2021-08-17 NOTE — ED NOTES
Patient is alert and oriented x3  Respirations not labored  No apparent distress noted  Skin is WNL  Pt monitored in place  Pt co chronic lower back pain.      Michael Chan RN  08/17/21 7986

## 2021-08-18 ASSESSMENT — ENCOUNTER SYMPTOMS
BACK PAIN: 1
VOMITING: 0

## 2021-08-19 NOTE — ED PROVIDER NOTES
[facility] EMERGENCY DEPT  eMERGENCY dEPARTMENT eNCOUnter      Pt Name: Julissa Harmon  MRN: [id number]  [place] 1980  Date of evaluation: 8/17/2021  Provider: Zafar Davis, [address]       Chief Complaint   Patient presents with    Back Pain     Lower         HISTORY OF PRESENT ILLNESS   (Location/Symptom, Timing/Onset,Context/Setting, Quality, Duration, Modifying Factors, Severity)  Note limiting factors. Julissa Harmon is a 39 y.o. male who presents to the emergency department with lower back pain. Pt has had prior back surgeries. He was helping someone who was bed bound turn and he felt a pop. Now having more pain. Radiates to both hips. Meds havent helped  No fever  No change in bowel or bladder habits  ambulatory      The history is provided by the patient. NursingNotes were reviewed. REVIEW OF SYSTEMS    (2-9 systems for level 4, 10 or more for level 5)     Review of Systems   Constitutional: Negative for fever. Gastrointestinal: Negative for vomiting. Genitourinary: Negative for difficulty urinating. Musculoskeletal: Positive for arthralgias, back pain and myalgias. Negative for gait problem and joint swelling. Except as noted above the remainder of the review of systems was reviewed and negative. PAST MEDICAL HISTORY     Past Medical History:   Diagnosis Date    Back pain     Chest pain     Scoliosis     Tobacco abuse     Vitamin D deficiency          SURGICALHISTORY       Past Surgical History:   Procedure Laterality Date    BACK SURGERY      x 3          CURRENT MEDICATIONS       Discharge Medication List as of 8/17/2021  1:14 PM      CONTINUE these medications which have NOT CHANGED    Details   gabapentin (NEURONTIN) 300 MG capsule Take 300 mg by mouth 3 times daily. Historical Med      HYDROcodone-acetaminophen (NORCO) 5-325 MG per tablet Take 1 tablet by mouth every 12 hours as needed. Historical Med      divalproex (DEPAKOTE) 250 MG DR tablet Take 1 tablet by mouth 2 times daily, Disp-180 tablet, R-3Normal      propranolol (INDERAL) 10 MG tablet Take 1 tablet by mouth 3 times daily 1 tab twice daily for 5 days then 3 times daily, Disp-90 tablet, R-1Normal      lisinopril (PRINIVIL;ZESTRIL) 10 MG tablet Take 10 mg by mouth dailyHistorical Med             ALLERGIES     Flexeril [cyclobenzaprine], Toradol [ketorolac tromethamine], Demerol hcl [meperidine], and Other    FAMILY HISTORY       Family History   Problem Relation Age of Onset    Heart Disease Mother     Hypertension Mother     Cancer Father         prostate          SOCIAL HISTORY       Social History     Socioeconomic History    Marital status: Single     Spouse name: Not on file    Number of children: Not on file    Years of education: Not on file    Highest education level: Not on file   Occupational History    Not on file   Tobacco Use    Smoking status: Current Every Day Smoker     Packs/day: 0.50    Smokeless tobacco: Never Used   Vaping Use    Vaping Use: Never used   Substance and Sexual Activity    Alcohol use: No    Drug use: No    Sexual activity: Not on file   Other Topics Concern    Not on file   Social History Narrative    Not on file     Social Determinants of Health     Financial Resource Strain:     Difficulty of Paying Living Expenses:    Food Insecurity:     Worried About Running Out of Food in the Last Year:     Ran Out of Food in the Last Year:    Transportation Needs:     Lack of Transportation (Medical):      Lack of Transportation (Non-Medical):    Physical Activity:     Days of Exercise per Week:     Minutes of Exercise per Session:    Stress:     Feeling of Stress :    Social Connections:     Frequency of Communication with Friends and Family:     Frequency of Social Gatherings with Friends and Family:     Attends Sikhism Services:     Active Member of Clubs or Organizations:     Attends Club or Organization Meetings:     Marital Status: Intimate Partner Violence:     Fear of Current or Ex-Partner:     Emotionally Abused:     Physically Abused:     Sexually Abused:        SCREENINGS      @FLOW([id number])@      PHYSICAL EXAM    (up to 7 for level 4, 8 or more for level 5)     ED Triage Vitals [08/17/21 1102]   BP Temp Temp src Pulse Resp SpO2 Height Weight   (!) 156/98 98.3 °F (36.8 °C) -- 80 18 98 % -- 221 lb (100.2 kg)       Physical Exam  Vitals and nursing note reviewed. Constitutional:       Appearance: He is well-developed. HENT:      Head: Normocephalic and atraumatic. Eyes:      General: No scleral icterus. Right eye: No discharge. Left eye: No discharge. Cardiovascular:      Rate and Rhythm: Normal rate. Pulmonary:      Effort: No respiratory distress. Musculoskeletal:      Cervical back: Normal range of motion and neck supple. Lumbar back: Tenderness present. No swelling, edema, signs of trauma, lacerations or spasms. Normal range of motion. Back:    Neurological:      Mental Status: He is alert. Psychiatric:         Behavior: Behavior normal.         DIAGNOSTIC RESULTS     EKG: All EKG's are interpreted by the Emergency Department Physician who either signs or Co-signsthis chart in the absence of a cardiologist.        RADIOLOGY:   Para [place] such as CT, Ultrasound and MRI are read by the radiologist. Plain radiographic images are visualized and preliminarily interpreted by the emergency physician with the below findings:      Interpretation per the Radiologist below, if available at the time of this note:    XR THORACIC SPINE (2 VIEWS)   Final Result   No acute bony abnormality appreciated. Signed by Dr Jeannie Prasad      XR LUMBAR SPINE (2-3 VIEWS)   Final Result   Impression:   1. No interval changes. 2.  Extensive postoperative changes as above. .   Signed by Dr Nirali Staton            ED BEDSIDEULTRASOUND:   Performed by ED Physician -none    LABS:  Labs Reviewed - No data to display    All other labs were within normal range or not returned as of this dictation. EMERGENCY DEPARTMENT COURSE and DIFFERENTIALDIAGNOSIS/MDM:   Vitals:    Vitals:    08/17/21 1102   BP: (!) 156/98   Pulse: 80   Resp: 18   Temp: 98.3 °F (36.8 °C)   SpO2: 98%   Weight: 221 lb (100.2 kg)           MDM  Better after medication      CONSULTS:  None    PROCEDURES:  Unless otherwise noted below, none     Procedures    FINAL IMPRESSION      1.  Acute exacerbation of chronic low back pain        DISPOSITION/PLAN   DISPOSITION Decision To Discharge 08/17/2021 01:03:59 PM      PATIENT REFERRED TO:  Cortney Ambriz MD  [facility]  [address] [place] [address]            DISCHARGE MEDICATIONS:  Discharge Medication List as of 8/17/2021  1:14 PM             (Please note that portions of this note were completed with a voice recognitionprogram.  Efforts were made to edit the dictations but occasionally words are mis-transcribed.)    TOYA Del Valle (electronically signed)         TOYA Del Valle  08/18/21 2020

## 2021-08-24 NOTE — CARE COORDINATION
LATE ENTRY:    We have a request for dental assistance. Patient is 39years old. All teeth are broken due to poor hygiene and drug use. Cost for extraction and bone reconstruction is $4,990. His primary care has asked the Foundation to pay for this gentlemans treatment. TRANSPARENCY:    - He is on disability; low income.  - HE is a fan of both hospitals Emergency Rooms. Most seeks pain medication for Back Pain. - Flagged as a drug seeker.  - He has Medicaid but it is not a managed care plan so he does not have dental benefits. Please let me know of your thoughts about this request.  Determination will be made based on majority of the committees vote as to cover the dental care or not. Thank you -     UMESH Rodriguez, [facility], [address] Management Team  [address] [place] [address], [address] [place] [address]  Jenna Limoner:  [phone]  C:  [phone]  F:  [phone]  Email:  [email or web address]    Visit [email or web address]    Submitted by Fidel/JUAN

## 2021-08-24 NOTE — CARE COORDINATION
[de-identified] of replies from the members of the [address] Advisory Committee have denied request for dental treatment. Cost is prohibitive as Fund balance cannot support paying for this service. Sending an email to Jaxon Simpson,  for Dr Negar Fitzgerald, Oral Surgeon, asking for an invoice for exam and xrays costs. Mailing a letter to the patient to let him know the request has been denied. Will refer him to [address] [facility] at [address] ([address] [place] [address]) [phone]. Submitted by Fidel/JUAN    EMAIL to [facility] MEDICINE:    PATIENT:  Elliott Douglass  :         1980  PHONE:   [facility] has denied the request for dental services for [facility] [place]. The [facility] [address] does not have a balance that could support the cost along with other patient needs. Please email me an invoice for Milos examination and xrays. I will get the invoice submitted for payment as soon as I get it from you. Thank you for your time and service. I appreciate all that you and Dr eNgar Fitzgerald do to help our patients. UMESH Chakraborty, [address], [facility]  [address] [place] [address], [facility] [address], [place] [address] [place]:  [phone]  C:  [phone]  F:  [phone]  Email:  [email or web address]    Visit [email or web address]    Submitted by Armand

## 2021-08-25 RX ORDER — PROPRANOLOL HYDROCHLORIDE 10 MG/1
TABLET ORAL
Qty: 90 TABLET | Refills: 5 | Status: SHIPPED | OUTPATIENT
Start: 2021-08-25

## 2021-08-25 NOTE — CARE COORDINATION
Letter typed. Asked Analisa Coffey for Dr Livia Valles to mail it to the patient.     Submitted by Fidel/JUAN

## 2021-10-27 ENCOUNTER — OFFICE VISIT (OUTPATIENT)
Dept: PRIMARY CARE CLINIC | Age: 41
End: 2021-10-27
Payer: MEDICARE

## 2021-10-27 VITALS
HEIGHT: 66 IN | SYSTOLIC BLOOD PRESSURE: 138 MMHG | DIASTOLIC BLOOD PRESSURE: 86 MMHG | WEIGHT: 220 LBS | BODY MASS INDEX: 35.36 KG/M2 | OXYGEN SATURATION: 97 % | TEMPERATURE: 97.5 F | HEART RATE: 124 BPM

## 2021-10-27 DIAGNOSIS — I10 ESSENTIAL HYPERTENSION: ICD-10-CM

## 2021-10-27 DIAGNOSIS — R26.9 GAIT ABNORMALITY: ICD-10-CM

## 2021-10-27 DIAGNOSIS — J30.1 ALLERGIC RHINITIS DUE TO POLLEN, UNSPECIFIED SEASONALITY: ICD-10-CM

## 2021-10-27 DIAGNOSIS — R60.0 BILATERAL LOWER EXTREMITY EDEMA: ICD-10-CM

## 2021-10-27 DIAGNOSIS — F51.04 INSOMNIA, PSYCHOPHYSIOLOGICAL: ICD-10-CM

## 2021-10-27 DIAGNOSIS — F31.9 BIPOLAR DEPRESSION (HCC): Primary | ICD-10-CM

## 2021-10-27 DIAGNOSIS — J40 SINOBRONCHITIS: ICD-10-CM

## 2021-10-27 DIAGNOSIS — J32.9 SINOBRONCHITIS: ICD-10-CM

## 2021-10-27 LAB
ALBUMIN SERPL-MCNC: 4.5 G/DL (ref 3.5–5.2)
ALP BLD-CCNC: 149 U/L (ref 40–130)
ALT SERPL-CCNC: 46 U/L (ref 5–41)
ANION GAP SERPL CALCULATED.3IONS-SCNC: 12 MMOL/L (ref 7–19)
AST SERPL-CCNC: 30 U/L (ref 5–40)
BILIRUB SERPL-MCNC: 0.3 MG/DL (ref 0.2–1.2)
BUN BLDV-MCNC: 10 MG/DL (ref 6–20)
CALCIUM SERPL-MCNC: 9.7 MG/DL (ref 8.6–10)
CHLORIDE BLD-SCNC: 102 MMOL/L (ref 98–111)
CO2: 27 MMOL/L (ref 22–29)
CREAT SERPL-MCNC: 0.7 MG/DL (ref 0.5–1.2)
GFR AFRICAN AMERICAN: >59
GFR NON-AFRICAN AMERICAN: >60
GLUCOSE BLD-MCNC: 92 MG/DL (ref 74–109)
POTASSIUM SERPL-SCNC: 4.8 MMOL/L (ref 3.5–5)
SODIUM BLD-SCNC: 141 MMOL/L (ref 136–145)
TOTAL PROTEIN: 7.5 G/DL (ref 6.6–8.7)

## 2021-10-27 PROCEDURE — G8427 DOCREV CUR MEDS BY ELIG CLIN: HCPCS | Performed by: FAMILY MEDICINE

## 2021-10-27 PROCEDURE — G8484 FLU IMMUNIZE NO ADMIN: HCPCS | Performed by: FAMILY MEDICINE

## 2021-10-27 PROCEDURE — 99214 OFFICE O/P EST MOD 30 MIN: CPT | Performed by: FAMILY MEDICINE

## 2021-10-27 PROCEDURE — 4004F PT TOBACCO SCREEN RCVD TLK: CPT | Performed by: FAMILY MEDICINE

## 2021-10-27 PROCEDURE — G8417 CALC BMI ABV UP PARAM F/U: HCPCS | Performed by: FAMILY MEDICINE

## 2021-10-27 RX ORDER — AMOXICILLIN AND CLAVULANATE POTASSIUM 875; 125 MG/1; MG/1
1 TABLET, FILM COATED ORAL 2 TIMES DAILY
Qty: 20 TABLET | Refills: 0 | Status: SHIPPED | OUTPATIENT
Start: 2021-10-27 | End: 2021-11-06

## 2021-10-27 RX ORDER — OLANZAPINE 5 MG/1
TABLET ORAL
Qty: 30 TABLET | Refills: 3 | Status: SHIPPED | OUTPATIENT
Start: 2021-10-27

## 2021-10-27 ASSESSMENT — ENCOUNTER SYMPTOMS
COUGH: 0
VOMITING: 0
NAUSEA: 0
WHEEZING: 0
SHORTNESS OF BREATH: 0
ABDOMINAL PAIN: 0
DIARRHEA: 0
CONSTIPATION: 0
CHEST TIGHTNESS: 0

## 2021-10-27 NOTE — PROGRESS NOTES
Dave Purcell is a 39 y.o. male who presents today for   Chief Complaint   Patient presents with    Swelling     fluid retention       HPI  Patient is here for fluid retention and swelling in legs and around groin area. Sx started about a month ago. He has had some SHORTNESS OF AIR. He wakes up frequently at night. He had a bad cough. No history of  covid or covid vaccine. He has been using the nebulizer. Also dealing with some nasal congestion and some cough. No change in PMH, family, social, or surgical history unless mentioned above. I have reviewed the above chief complaint and HPI details charted by staff and claim ownership of the documentation. Review of Systems   Constitutional: Negative for chills and fever. Respiratory: Negative for cough, chest tightness, shortness of breath and wheezing. Cardiovascular: Negative for chest pain, palpitations and leg swelling. Gastrointestinal: Negative for abdominal pain, constipation, diarrhea, nausea and vomiting. Genitourinary: Negative for difficulty urinating, dysuria and frequency. Musculoskeletal: Positive for arthralgias and gait problem. Past Medical History:   Diagnosis Date    Back pain     Chest pain     Scoliosis     Tobacco abuse     Vitamin D deficiency        Current Outpatient Medications   Medication Sig Dispense Refill    OLANZapine (ZYPREXA) 5 MG tablet Take 1/2 tab about 2 hrs before bedtime for 2 nights, then 1 tab nightly 30 tablet 3    propranolol (INDERAL) 10 MG tablet TAKE 1 TABLET BY MOUTH TWICE DAILY FOR 5 DAYS THEN INCREASE TO 1 TABLET BY MOUTH THREE TIMES A DAY   90 tablet 5    gabapentin (NEURONTIN) 300 MG capsule Take 300 mg by mouth 3 times daily.  HYDROcodone-acetaminophen (NORCO) 5-325 MG per tablet Take 1 tablet by mouth every 12 hours as needed.       divalproex (DEPAKOTE) 250 MG DR tablet Take 1 tablet by mouth 2 times daily 180 tablet 3    lisinopril (PRINIVIL;ZESTRIL) 10 MG tablet Take 10 mg by mouth daily       No current facility-administered medications for this visit. Allergies   Allergen Reactions    Flexeril [Cyclobenzaprine] Itching    Toradol [Ketorolac Tromethamine] Itching     itching    Demerol Hcl [Meperidine] Rash    Other Rash     darvocet       Past Surgical History:   Procedure Laterality Date    BACK SURGERY      x 3        Social History     Tobacco Use    Smoking status: Current Every Day Smoker     Packs/day: 0.50    Smokeless tobacco: Never Used   Vaping Use    Vaping Use: Never used   Substance Use Topics    Alcohol use: No    Drug use: No       Family History   Problem Relation Age of Onset    Heart Disease Mother     Hypertension Mother     Cancer Father         prostate       /86 (Site: Right Upper Arm, Position: Sitting)   Pulse 124   Temp 97.5 °F (36.4 °C)   Ht 5' 6\" (1.676 m)   Wt 220 lb (99.8 kg)   SpO2 97%   BMI 35.51 kg/m²     Physical Exam  Vitals and nursing note reviewed. Constitutional:       General: He is not in acute distress. Appearance: He is well-developed. He is ill-appearing. He is not toxic-appearing or diaphoretic. HENT:      Head: Normocephalic and atraumatic. Right Ear: Hearing, tympanic membrane, ear canal and external ear normal.      Left Ear: Hearing, tympanic membrane, ear canal and external ear normal.      Nose: Mucosal edema and rhinorrhea present. Mouth/Throat:      Pharynx: Uvula midline. Posterior oropharyngeal erythema present. No oropharyngeal exudate. Neck:      Trachea: No tracheal deviation. Cardiovascular:      Rate and Rhythm: Normal rate and regular rhythm. Heart sounds: Normal heart sounds. No murmur heard. No friction rub. No gallop. Pulmonary:      Effort: Pulmonary effort is normal. No respiratory distress. Breath sounds: No stridor. Wheezing present. No rales. Chest:      Chest wall: No tenderness.    Abdominal:      General: Bowel sounds are normal. There is no distension. Palpations: Abdomen is soft. There is no mass. Tenderness: There is no abdominal tenderness. There is no guarding or rebound. Musculoskeletal:      Right lower leg: No edema. Left lower leg: No edema. Right foot: Deformity present. Left foot: Deformity (b/l ext rotation and flattening of arch noted) present. Skin:     General: Skin is warm and dry. Nails: There is no clubbing. Neurological:      Mental Status: He is alert and oriented to person, place, and time. Coordination: Coordination normal.      Gait: Gait normal.   Psychiatric:         Mood and Affect: Mood is not anxious or depressed. Speech: Speech normal.         Behavior: Behavior normal.         Thought Content: Thought content does not include homicidal or suicidal ideation. Judgment: Judgment normal.         Assessment:    ICD-10-CM    1. Bipolar depression ([facility] [address]  F31.9    2. Allergic rhinitis due to pollen, unspecified seasonality  J30.1    3. Essential hypertension  I10 Comprehensive Metabolic Panel   4. Gait abnormality  R26.9 External Referral To Podiatry   5. Bilateral lower extremity edema  R60.0 Comprehensive Metabolic Panel   6. Sinobronchitis  J32.9     J40    7. 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 Acute treatment for illness. Titration of psychiatric medication for bipolar disorder and insomnia.   Orders Placed This Encounter   Procedures    Comprehensive Metabolic Panel     Standing Status:   Future     Number of Occurrences:   1     Standing Expiration Date:   10/27/2022    External Referral To Podiatry     Referral Priority:   Routine     Referral Type:   Eval and Treat     Referral Reason:   Specialty Services Required     Requested Specialty:   Podiatry     Number of Visits Requested: 1     Orders Placed This Encounter   Medications    OLANZapine (ZYPREXA) 5 MG tablet     Sig: Take 1/2 tab about 2 hrs before bedtime for 2 nights, then 1 tab nightly     Dispense:  30 tablet     Refill:  3    amoxicillin-clavulanate (AUGMENTIN) 875-125 MG per tablet     Sig: Take 1 tablet by mouth 2 times daily for 10 days     Dispense:  20 tablet     Refill:  0     There are no discontinued medications. There are no Patient Instructions on file for this visit. Patient given educational handouts and has had all questions answered. Patient voices understanding and agrees to plans along with risks and benefits of plan. Patient isinstructed to continue prior meds, diet, and exercise plans unless instructed otherwise. Patient agrees to follow up as instructed and sooner if needed. Patient agrees to go to ER if condition becomes emergent. Notesmay be completed with dictation device and spelling errors may occur. Materials may be copied and pasted from a notepad outside of EMR, all of which, I, Dr. Glendy Galdamez MD, take sole intellectual ownership of and have approved adding to my note. Return in about 3 months (around 1/27/2022) for ov f/u olanzapine.

## 2021-11-01 ENCOUNTER — TELEPHONE (OUTPATIENT)
Dept: PODIATRY | Facility: CLINIC | Age: 41
End: 2021-11-01

## 2021-11-01 NOTE — TELEPHONE ENCOUNTER
Called pt regarding upcoming new appt time, spoke to wife, told her it was on the 11th at 10:15. She stated she would relay the message

## 2021-11-03 NOTE — PROGRESS NOTES
[facility] - PODIATRY    Today's Date: 11/11/21    Patient Name: Frederic Willoughby  MRN: [id number]  CSN: [id number]  PCP: Savanna Conley MD  Referring Provider: No ref. provider found    SUBJECTIVE     Chief Complaint   Patient presents with   • Gait Problem     Gait abnormality.  Pt has inversion of both feet with chronic pain.  Pt's pain level is a 5/10, however, he states that this is more in his hips.  Last saw PCP on 10/29/21.  Pt is unaware of his medications.      HPI: Frederic Willoughby, a 41 y.o.male, comes to clinic as a(n) new patient complaining of foot pain. Patient has h/o back pain, HTN, Neuropathy, Tobacco use. Patient mostly complains of back and hip pain which has required back surgeries and he is considering hip replacements but feels that his flat feet are contributing to his pain. Denies injury to either foot. Notes that his arch falls and he wears out the inside of his shoes. Has never had inserts in his shoes. Admits to walking barefoot frequently. Admits pain at 5/10 level and described as aching and nagging. Denies previous treatment. Denies any constitutional symptoms. No other pedal complaints at this time.    Past Medical History:   Diagnosis Date   • Back pain    • Hypertension    • Neuropathy      Past Surgical History:   Procedure Laterality Date   • BACK SURGERY       History reviewed. No pertinent family history.  Social History     Socioeconomic History   • Marital status: Single   Tobacco Use   • Smoking status: Current Every Day Smoker     Packs/day: 0.50     Types: Cigarettes   • Smokeless tobacco: Never Used   Substance and Sexual Activity   • Alcohol use: No   • Drug use: No   • Sexual activity: Defer     Allergies   Allergen Reactions   • Benadryl [Diphenhydramine] Other (See Comments)     Restless leg   • Darvon [Propoxyphene]    • Dilaudid [Hydromorphone Hcl]    • Flexeril [Cyclobenzaprine]    • Naproxen    • Nubain [Nalbuphine Hcl]    • Toradol [Ketorolac  Tromethamine]    • Zanaflex [Tizanidine Hcl] Other (See Comments)     Restless Leg      Current Outpatient Medications   Medication Sig Dispense Refill   • gabapentin (NEURONTIN) 300 MG capsule Take 300 mg by mouth 3 (Three) Times a Day.     • HYDROcodone-acetaminophen (NORCO) 5-325 MG per tablet Take 1 tablet by mouth Every 12 (Twelve) Hours As Needed.     • lisinopril (PRINIVIL,ZESTRIL) 10 MG tablet Take 10 mg by mouth Daily. Patient out of medication     • lisinopril (PRINIVIL,ZESTRIL) 20 MG tablet      • methylPREDNISolone (MEDROL) 4 MG dose pack Take as directed on package instructions. 21 each 0   • orphenadrine (NORFLEX) 100 MG 12 hr tablet Take 1 tablet by mouth 2 (Two) Times a Day. 20 tablet 0   • oxyCODONE-acetaminophen (PERCOCET) 5-325 MG per tablet Take 1 tablet by mouth Every 6 (Six) Hours As Needed for Severe Pain . 5 tablet 0   • penicillin v potassium (VEETID) 500 MG tablet Take 1 tablet by mouth 4 (Four) Times a Day. 28 tablet 0     No current facility-administered medications for this visit.     Review of Systems   Constitutional: Negative for chills and fever.   HENT: Negative for congestion.    Respiratory: Negative for shortness of breath.    Cardiovascular: Negative for chest pain and leg swelling.   Gastrointestinal: Negative for constipation, diarrhea, nausea and vomiting.   Musculoskeletal: Positive for arthralgias and back pain.        Foot pain   Skin: Negative for wound.   Neurological: Negative for numbness.       OBJECTIVE     Vitals:    11/11/21 1044   BP: 142/94   Pulse: 108   SpO2: 97%       PHYSICAL EXAM  GEN:   Accompanied by mother.     Foot/Ankle Exam:       General:   Appearance: appears stated age and healthy    Orientation: AAOx3    Affect: appropriate    Gait: unimpaired    Assistance: independent    Shoe Gear:  Sandals    VASCULAR      Right Foot Vascularity   Dorsalis pedis:  2+  Posterior tibial:  2+  Skin Temperature: warm    Edema Grading:  None  CFT:  3  Pedal Hair  Growth:  Present  Varicosities: none       Left Foot Vascularity   Dorsalis pedis:  2+  Posterior tibial:  2+  Skin Temperature: warm    Edema Grading:  None  CFT:  3  Pedal Hair Growth:  Present  Varicosities: none        NEUROLOGIC     Right Foot Neurologic   Light touch sensation:  Diminished  Vibratory sensation:  Normal  Hot/Cold sensation: normal    Protective Sensation using [place] Monofilament:  10     Left Foot Neurologic   Light touch sensation:  Diminished  Vibratory sensation:  Normal  Hot/cold sensation: normal    Protective Sensation using [place] Monofilament:  10     MUSCULOSKELETAL      Right Foot Musculoskeletal   Ecchymosis:  None  Tenderness: posterior tibial tendon    Arch:  Pes planus     Left Foot Musculoskeletal   Ecchymosis:  None  Tenderness: none and posterior tibial tendon    Arch:  Pes planus     MUSCLE STRENGTH     Right Foot Muscle Strength   Foot dorsiflexion:  5  Foot plantar flexion:  5  Foot inversion:  5  Foot eversion:  5     Left Foot Muscle Strength   Foot dorsiflexion:  5  Foot plantar flexion:  5  Foot inversion:  5  Foot eversion:  5     RANGE OF MOTION      Right Foot Range of Motion   Foot and ankle ROM within normal limits       Left Foot Range of Motion   Foot and ankle ROM within normal limits       DERMATOLOGIC     Right Foot Dermatologic   Skin: skin intact       Left Foot Dermatologic   Skin: skin intact       TESTS     Right Foot Tests   Too many toes: positive    Heel raise: pain and normal control       Left Foot Tests   Too many toes: positive    Heel raise: pain and normal control        RADIOLOGY/NUCLEAR:  No results found.    LABORATORY/CULTURE RESULTS:      PATHOLOGY RESULTS:       ASSESSMENT/PLAN     Diagnoses and all orders for this visit:    1. Pes planus of both feet (Primary)  -     XR Foot 3+ View Bilateral; Future  -     XR Ankle 3+ View Bilateral; Future  -     XR Calcaneus 2+ View Bilateral; Future      Comprehensive lower  extremity examination and evaluation was performed.  Discussed findings and treatment plan including risks, benefits, and treatment options with patient in detail. Patient agreed with treatment plan.  While patient mostly complains of pain to his back/hips, pes planus can contribute to these issues. Suggest initially attempting use of OTC arch supports on a daily basis. May eventually need CMO.   Xrays to evaluate foot structure.   An After Visit Summary was printed and given to the patient at discharge, including (if requested) any available informative/educational handouts regarding diagnosis, treatment, or medications. All questions were answered to patient/family satisfaction. Should symptoms fail to improve or worsen they agree to call or return to clinic or to go to the Emergency Department. Discussed the importance of following up with any needed screening tests/labs/specialist appointments and any requested follow-up recommended by me today. Importance of maintaining follow-up discussed and patient accepts that missed appointments can delay diagnosis and potentially lead to worsening of conditions.  Return in about 6 months (around 5/11/2022) for Follow-up with Dr. Feng., or sooner if acute issues arise.        This document has been electronically signed by Jovani Feng DPM on November 11, 2021 11:13 CST

## 2021-11-11 ENCOUNTER — OFFICE VISIT (OUTPATIENT)
Dept: PODIATRY | Facility: CLINIC | Age: 41
End: 2021-11-11

## 2021-11-11 ENCOUNTER — HOSPITAL ENCOUNTER (OUTPATIENT)
Dept: GENERAL RADIOLOGY | Facility: HOSPITAL | Age: 41
Discharge: HOME | End: 2021-11-11

## 2021-11-11 ENCOUNTER — PATIENT ROUNDING (BHMG ONLY) (OUTPATIENT)
Dept: VASCULAR SURGERY | Facility: CLINIC | Age: 41
End: 2021-11-11

## 2021-11-11 VITALS
OXYGEN SATURATION: 97 % | HEART RATE: 108 BPM | WEIGHT: 222 LBS | SYSTOLIC BLOOD PRESSURE: 142 MMHG | HEIGHT: 66 IN | DIASTOLIC BLOOD PRESSURE: 94 MMHG | BODY MASS INDEX: 35.68 KG/M2

## 2021-11-11 DIAGNOSIS — M21.41 PES PLANUS OF BOTH FEET: Primary | ICD-10-CM

## 2021-11-11 DIAGNOSIS — M21.41 PES PLANUS OF BOTH FEET: ICD-10-CM

## 2021-11-11 DIAGNOSIS — M21.42 PES PLANUS OF BOTH FEET: ICD-10-CM

## 2021-11-11 DIAGNOSIS — M21.42 PES PLANUS OF BOTH FEET: Primary | ICD-10-CM

## 2021-11-11 PROCEDURE — 99203 OFFICE O/P NEW LOW 30 MIN: CPT | Performed by: PODIATRIST

## 2021-11-11 PROCEDURE — 73610 X-RAY EXAM OF ANKLE: CPT

## 2021-11-11 PROCEDURE — 73650 X-RAY EXAM OF HEEL: CPT

## 2021-11-11 PROCEDURE — 73630 X-RAY EXAM OF FOOT: CPT

## 2021-11-11 RX ORDER — LISINOPRIL 20 MG/1
TABLET ORAL
COMMUNITY
Start: 2021-10-08

## 2021-11-11 NOTE — PROGRESS NOTES
November 11, 2021    Hello, may I speak with Frederic Willoughby?    My name is Ruba      I am  with [facility]  [address], [address]  [place] [address] [phone].    Before we get started may I verify your date of birth? 1980    I am calling to officially welcome you to our practice and ask about your recent visit. Is this a good time to talk ? Yes    Tell me about your visit with us. What things went well?  Dr. Feng was nice. I have to go get some xrays       We're always looking for ways to make our patients' experiences even better. Do you have recommendations on ways we may improve?  No ma'am     Overall were you satisfied with your first visit to our practice? Yes .       I appreciate you taking the time to speak with me today. Is there anything else I can do for you? If you can just direct me where to get my xrays I will be fine .      Thank you, and have a great day.

## 2021-11-11 NOTE — PATIENT INSTRUCTIONS
Flat Feet, Adult    Flat feet is a common condition in which there is no curve, or arch, on the inner sides of the feet. Normally, an adult foot has an arch. The arch creates a gap between the foot and the ground. This condition can occur in one foot or in both feet.  What are the causes?  This condition may be caused by:  · An injury to tendons and ligaments in the foot, such as to the tendon that supports the arch (posterior tibial tendon). Tendons are tissues that connect muscle to bone, and ligaments are tissues that connect bones to each other.  · Loose tendons or ligaments in your foot.  · A wearing down of your arch over time.  · Injury to bones in your foot.  · An abnormality in the bones of your foot called tarsal coalition. This happens when two or more bones in the foot are joined together (fused). Tarsal coalition is often present at birth, but signs of it typically do not show until the early teen years.  What increases the risk?  The following factors may make you more likely to develop this condition:  · Being an adult age 40 or older.  · Having a family history of flat feet or a history of childhood flexible flatfoot.  · Having obesity, diabetes, or high blood pressure.  · Taking part in high-impact sports.  · Having inflammatory arthritis.  · Having had any of these problems with the bones in your foot:  ? A broken bone (fracture).  ? Bones that were moved out of place (dislocated).  ? Achilles tendon injuries.  What are the signs or symptoms?  Symptoms of this condition include:  · Pain or tightness along the bottom of your foot.  · Foot pain that gets worse with activity.  · Swelling of the inner side of your foot or swelling of your ankle.  · Pain on the outer side of your ankle.  · Changes in gait. Your gait is the way that you walk.  · Pronation. This is when the foot and ankle lean inward when you are standing.  · Bony bumps on the top or inner side of your foot.  How is this diagnosed?  This  condition is diagnosed with a physical exam of your foot and ankle. Your health care provider may also:  · Check your shoes for patterns of wear on the soles.  · Order imaging tests, such as X-rays to see the bone structure.  · Refer you to a health care provider who specializes in feet (podiatrist).  How is this treated?  This condition may be treated with:  · Rest and ice to decrease inflammation and pain in the affected foot.  · Stretching or physical therapy exercises. These are done to:  ? Improve movement and strength in your foot.  ? Increase range of motion and relieve pain.  · A shoe insert (orthotic insert) for one foot or both feet. This helps to support the arch of your foot. An orthotic insert or inserts can be purchased from a store or can be custom-made.  · Wearing shoes with appropriate arch support. This is especially important for athletes.  · Medicines. These may be prescribed or injected into the affected foot to relieve pain.  · An ankle boot or cast or a foot or leg brace to relieve pressure on your affected foot.  · Surgery. This may be done to improve the alignment of your foot. Surgery is needed only if your posterior tibial tendon is torn or if you have tarsal coalition.  Follow these instructions at home:  Managing pain, stiffness, and swelling  · If directed, put ice on the painful area. To do this:  ? If you have a removable brace or boot, remove it as told by your health care provider.  ? Put ice in a plastic bag.  ? Place a towel between your skin and the bag or between your cast and the bag.  ? Leave the ice on for 20 minutes, 2-3 times a day.  ? Remove the ice if your skin turns bright red. This is very important. If you cannot feel pain, heat, or cold, you have a greater risk of damage to the area.  · Move your toes often to reduce stiffness and swelling.  · Raise (elevate) the injured area above the level of your heart while you are sitting or lying down.  Activity  · Do exercises  as told by your health care provider, if they were prescribed.  · If an activity causes pain, avoid it or try to find another activity that does not cause pain.  General instructions  · Wear an orthotic insert and appropriate shoes as told by your health care provider.  · Take over-the-counter and prescription medicines only as told by your health care provider.  · Wear a brace, boot, or cast as told by your health care provider.  · Keep all follow-up visits. This is important.  How is this prevented?  To prevent the condition from getting worse:  · Wear comfortable, supportive shoes that are appropriate for your activities.  · Maintain a healthy weight.  · Stay active in a way that your health care provider recommends. This will help to keep your feet flexible and strong.  · Manage long-term (chronic) health conditions, such as diabetes, high blood pressure, and inflammatory arthritis.  · Work with a health care provider if you have concerns about your feet or shoes.  Contact a health care provider if you have:  · Pain in your foot or lower leg that gets worse or does not improve with medicine.  · Pain or trouble when walking.  · Problems with your orthotic insert.  Summary  · Flat feet is a common condition in which there is no curve, or arch, on the inner sides of the feet. This condition can occur in one foot or in both feet.  · Your health care provider may recommend a shoe insert (orthotic insert) for one foot or both feet, or shoes with the appropriate arch support.  · Other treatments may include stretching or physical therapy exercises, pain medicines, and wearing a foot or leg brace or an ankle boot or cast.  · Surgery may be done if you have a tear in the tendon that supports your arch (posterior tibial tendon) or if two or more of your foot bones were joined together, or fused,before birth (tarsal coalition).  This information is not intended to replace advice given to you by your health care provider.  Make sure you discuss any questions you have with your health care provider.  Document Revised: 05/27/2021 Document Reviewed: 05/27/2021  Elsevier Patient Education © 2021 [employer]

## 2021-11-12 ENCOUNTER — TELEPHONE (OUTPATIENT)
Dept: PODIATRY | Facility: CLINIC | Age: 41
End: 2021-11-12

## 2021-12-03 ENCOUNTER — HOSPITAL ENCOUNTER (EMERGENCY)
Facility: HOSPITAL | Age: 41
Discharge: LEFT BEFORE DISCHARGE | End: 2021-12-03

## 2021-12-03 VITALS
TEMPERATURE: 99.2 F | BODY MASS INDEX: 34.72 KG/M2 | WEIGHT: 216 LBS | OXYGEN SATURATION: 98 % | RESPIRATION RATE: 20 BRPM | HEART RATE: 119 BPM | DIASTOLIC BLOOD PRESSURE: 94 MMHG | SYSTOLIC BLOOD PRESSURE: 145 MMHG | HEIGHT: 66 IN

## 2021-12-03 PROCEDURE — 99211 OFF/OP EST MAY X REQ PHY/QHP: CPT

## 2021-12-04 NOTE — ED NOTES
Called this pt for a room at this time and the pt was not in triage waiting room or restroom.      Ping Beltre RN  12/03/21 8242

## 2021-12-04 NOTE — ED NOTES
"Pt presents CC of lower back and hip pain from falling earlier this evening. The pt states he fell in a breezeway and landed on his right side. The pt states he took a pain pill PTA that \"didn't touch the pain.\" The pt denies hitting his head or LOC.     Ping Beltre, RN  12/03/21 8354    "

## 2022-01-24 ENCOUNTER — APPOINTMENT (OUTPATIENT)
Dept: CT IMAGING | Facility: HOSPITAL | Age: 42
End: 2022-01-24

## 2022-01-24 ENCOUNTER — HOSPITAL ENCOUNTER (EMERGENCY)
Facility: HOSPITAL | Age: 42
Discharge: HOME | End: 2022-01-24
Attending: STUDENT IN AN ORGANIZED HEALTH CARE EDUCATION/TRAINING PROGRAM | Admitting: STUDENT IN AN ORGANIZED HEALTH CARE EDUCATION/TRAINING PROGRAM

## 2022-01-24 VITALS
SYSTOLIC BLOOD PRESSURE: 158 MMHG | HEIGHT: 66 IN | WEIGHT: 224 LBS | DIASTOLIC BLOOD PRESSURE: 106 MMHG | RESPIRATION RATE: 20 BRPM | TEMPERATURE: 98.2 F | BODY MASS INDEX: 36 KG/M2 | OXYGEN SATURATION: 100 % | HEART RATE: 93 BPM

## 2022-01-24 DIAGNOSIS — M54.50 CHRONIC MIDLINE LOW BACK PAIN WITHOUT SCIATICA: Primary | ICD-10-CM

## 2022-01-24 DIAGNOSIS — G89.29 CHRONIC MIDLINE LOW BACK PAIN WITHOUT SCIATICA: Primary | ICD-10-CM

## 2022-01-24 PROCEDURE — 96372 THER/PROPH/DIAG INJ SC/IM: CPT

## 2022-01-24 PROCEDURE — 99283 EMERGENCY DEPT VISIT LOW MDM: CPT

## 2022-01-24 PROCEDURE — 72131 CT LUMBAR SPINE W/O DYE: CPT

## 2022-01-24 PROCEDURE — 25010000002 MORPHINE PER 10 MG: Performed by: STUDENT IN AN ORGANIZED HEALTH CARE EDUCATION/TRAINING PROGRAM

## 2022-01-24 RX ORDER — ACETAMINOPHEN 500 MG
1000 TABLET ORAL ONCE
Status: COMPLETED | OUTPATIENT
Start: 2022-01-24 | End: 2022-01-24

## 2022-01-24 RX ORDER — MORPHINE SULFATE 10 MG/ML
8 INJECTION INTRAMUSCULAR; INTRAVENOUS; SUBCUTANEOUS ONCE
Status: COMPLETED | OUTPATIENT
Start: 2022-01-24 | End: 2022-01-24

## 2022-01-24 RX ADMIN — MORPHINE SULFATE 8 MG: 10 INJECTION INTRAVENOUS at 03:33

## 2022-01-24 RX ADMIN — ACETAMINOPHEN 1000 MG: 500 TABLET, FILM COATED ORAL at 03:33

## 2022-01-24 NOTE — DISCHARGE INSTRUCTIONS
You were evaluated in the ER for back pain. Your workup showed no indication at this time for admission to the hospital. Please use your home medications for symptomatic improvement.     It is VERY IMPORTANT that you follow up (call them to set up an appointment) with your primary care doctor* within the next few days or as soon as possible so that you can be re-evaluated for improvement in your symptoms or for any other questions. If you were prescribed any medications, please take them as directed or call us back with any questions.     Return to the ER within 24-48 hours if you have any new symptoms, worsening symptoms, or any other concerns.    _____  *If you do not have a primary care doctor, follow up with one of the [facility] physician groups below to setup primary care.     If you have trouble making an appointment, please call the [facility] Nurse Line at [phone]    Dr. Мария Shelby DO, Dr. Arlene Victoria DO, and CATHIE Carpio  [facility]  [address] [place] [address], [address] [phone]    Dr. Alton Garcia MD  [facility] - [address] [address], [address], [place] [address] [phone]    Dr. Brenden Bah DO, Dr. Kofi Rucker DO,  CATHIE Briggs, and CATHIE Petersen  [facility] - [address] [address], [address], [place] [address] [phone]     Dr. Henrietta Mckeon MD, and Belkys Burt, CATHIE  [facility] [address] [address], [place] [address] [phone]    Dr. Zeus Forman MD and Dr. Henri Springer MD  [facility]  [address] [place] [address], [id number] [phone]    Dr. Jaylon Santiago MD  [facility]  [address] [place] [address], [address], [place], [place],  [address] [phone]    Dr. Mike Mason MD  [facility] - [address], [address] [phone]

## 2022-01-29 NOTE — ED PROVIDER NOTES
Subjective   Patient states that he was helping a friend move yesterday and noticed that he had worsening in his lower back pain that he has had for a long time.  Denies any saddle paresthesias, lower extremity weakness or any difficulty walking.  States that the pain is a 9 out of 10 and is radiates to both of his legs.  He states that he has also run out of his pain medications and he sees a doctor for them but is unable to see so until the next few days.  Denies any recent fevers or chills.          Review of Systems   All other systems reviewed and are negative.      Past Medical History:   Diagnosis Date   • Back pain    • Hypertension    • Neuropathy        Allergies   Allergen Reactions   • Benadryl [Diphenhydramine] Other (See Comments)     Restless leg   • Darvon [Propoxyphene]    • Dilaudid [Hydromorphone Hcl]    • Flexeril [Cyclobenzaprine]    • Naproxen    • Nubain [Nalbuphine Hcl]    • Toradol [Ketorolac Tromethamine]    • Zanaflex [Tizanidine Hcl] Other (See Comments)     Restless Leg        Past Surgical History:   Procedure Laterality Date   • BACK SURGERY         History reviewed. No pertinent family history.    Social History     Socioeconomic History   • Marital status: Significant Other   Tobacco Use   • Smoking status: Current Every Day Smoker     Packs/day: 0.50     Types: Cigarettes   • Smokeless tobacco: Never Used   Substance and Sexual Activity   • Alcohol use: No   • Drug use: No   • Sexual activity: Defer           Objective   Physical Exam  Vitals and nursing note reviewed.   Constitutional:       General: He is not in acute distress.     Appearance: He is not toxic-appearing or diaphoretic.   HENT:      Head: Normocephalic and atraumatic.      Nose: Nose normal.   Eyes:      General:         Right eye: No discharge.         Left eye: No discharge.      Extraocular Movements: Extraocular movements intact.      Conjunctiva/sclera: Conjunctivae normal.   Cardiovascular:      Rate and  Rhythm: Normal rate.   Pulmonary:      Effort: Pulmonary effort is normal. No respiratory distress.      Breath sounds: No stridor. No rhonchi.   Abdominal:      General: Abdomen is flat.      Tenderness: There is no abdominal tenderness. There is no guarding or rebound.   Musculoskeletal:         General: Normal range of motion.      Cervical back: No rigidity.      Comments: Mild midline/paralumbar back pain.   Skin:     General: Skin is warm.      Capillary Refill: Capillary refill takes less than 2 seconds.   Neurological:      General: No focal deficit present.      Mental Status: He is alert and oriented to person, place, and time. Mental status is at baseline.      Cranial Nerves: No cranial nerve deficit.      Sensory: No sensory deficit.      Motor: No weakness.      Gait: Gait normal.   Psychiatric:         Mood and Affect: Mood normal.         Behavior: Behavior normal.         Thought Content: Thought content normal.         Judgment: Judgment normal.         Procedures           ED Course                                                 MDM  Number of Diagnoses or Management Options  Chronic midline low back pain without sciatica  Diagnosis management comments: This is a 41yoM presenting with back pain. Patient arrived hemodynamically stable and was afebrile. Patient was placed on the monitor and IV access was established. Glucose within normal limits. EKG was obtained and did not reveal any malignant/unstable dysrhythmia or any acute ST elevations. Differentials include, but are not limited to sciatica, lumbar strain, cauda equina, fracture. Plan to obtain CT lumbar spine, control pain, and reassess.       Presentation not consistent with other acute, emergent causes of back pain at this time. Low suspicion for dissection there is no hypotension, pulse deficits, and no tearing back/abdominal pain. Low suspicion for acute PE as low risk per WELLS criteria and the patient is not tachycardic or tachypneic.  Low suspicion for a DVT as there is no calf swelling, tenderness, palpable tortuous lower extremity vein, or demian's sign bilaterally. Low suspicion cauda equina or acute cord compression as there is no evidence of trauma, bowel or urinary incontinence, urinary retention, saddle anesthesia, or distal weakness. Low suspicion for renal colic or pyelonephritis as the patient is afebrile, has no CVA tenderness, and has no urinary symptoms. Low concern for fracture as there is no evidence of bony tenderness or recent trauma and no palpable step-offs. Low concern for malignancy as there is no recent history of weakness, weight loss, malaise, or history of malignancy. Low suspicion for upper cord abnormalities without any evidence of upper extremity weakness, sensory deficits, or any recent significant trauma.    The workup was reviewed and found to have evidence no acutely actionable abnormalities. He is feeling better after pain medication. I reassessed the patient and discussed the findings of the work up so far. I explained my impression of the workup to him and addressed all of his questions regarding the emergency department evaluation, diagnosis, and treatment plan in plain and simple language that he was able to understand.     He voiced agreement with the plan of care so far and had no further questions. I told him that there is always some diagnostic uncertainty in the ER and that his work up, physical exam, and even his current presentation may not always reveal other underlying conditions. I also went over the fact that his condition may change or show itself after being discharged. He expressed understanding and agreed that there are reasonable limitations with the practice of emergency medicine.    I gave him return precautions and told him to return to the emergency department within 24 - 48hrs if he has any new, worsening, or concerning symptoms.     I told him that it is VERY IMPORTANT that he follows up  (by calling to set up an appointment) with his primary care doctor within the next few days or as soon as reasonably possible so that he can be re-evaluated for improvement in his symptoms or for any other questions. He verbalized understanding of these instructions.     He was discharged in stable condition and was observed ambulating out of the ER.          Amount and/or Complexity of Data Reviewed  Tests in the radiology section of CPT®: ordered and reviewed    Risk of Complications, Morbidity, and/or Mortality  Presenting problems: moderate  Diagnostic procedures: low  Management options: low        Final diagnoses:   Chronic midline low back pain without sciatica       ED Disposition  ED Disposition     ED Disposition Condition Comment    Discharge Stable           Savanna Conley MD  [address] [place] [address]  [phone]    Call in 1 day  As needed, If symptoms worsen         Medication List      No changes were made to your prescriptions during this visit.          Desmond Josue MD  01/29/22 0531

## 2022-02-22 ENCOUNTER — HOSPITAL ENCOUNTER (EMERGENCY)
Age: 42
Discharge: HOME | End: 2022-02-23
Attending: EMERGENCY MEDICINE
Payer: MEDICARE

## 2022-02-22 ENCOUNTER — APPOINTMENT (OUTPATIENT)
Dept: CT IMAGING | Age: 42
End: 2022-02-22
Payer: MEDICARE

## 2022-02-22 DIAGNOSIS — G89.29 ACUTE EXACERBATION OF CHRONIC LOW BACK PAIN: Primary | ICD-10-CM

## 2022-02-22 DIAGNOSIS — M54.50 ACUTE EXACERBATION OF CHRONIC LOW BACK PAIN: Primary | ICD-10-CM

## 2022-02-22 PROCEDURE — 96372 THER/PROPH/DIAG INJ SC/IM: CPT

## 2022-02-22 PROCEDURE — 99282 EMERGENCY DEPT VISIT SF MDM: CPT

## 2022-02-22 PROCEDURE — 72128 CT CHEST SPINE W/O DYE: CPT

## 2022-02-22 PROCEDURE — 6360000002 HC RX W HCPCS: Performed by: EMERGENCY MEDICINE

## 2022-02-22 RX ORDER — DEXAMETHASONE SODIUM PHOSPHATE 10 MG/ML
10 INJECTION, SOLUTION INTRAMUSCULAR; INTRAVENOUS ONCE
Status: COMPLETED | OUTPATIENT
Start: 2022-02-22 | End: 2022-02-22

## 2022-02-22 RX ORDER — ORPHENADRINE CITRATE 30 MG/ML
60 INJECTION INTRAMUSCULAR; INTRAVENOUS ONCE
Status: COMPLETED | OUTPATIENT
Start: 2022-02-22 | End: 2022-02-22

## 2022-02-22 RX ORDER — METHYLPREDNISOLONE 4 MG/1
TABLET ORAL
Qty: 1 KIT | Refills: 0 | Status: SHIPPED | OUTPATIENT
Start: 2022-02-22 | End: 2022-04-07 | Stop reason: ALTCHOICE

## 2022-02-22 RX ADMIN — ORPHENADRINE CITRATE 60 MG: 30 INJECTION INTRAMUSCULAR; INTRAVENOUS at 23:08

## 2022-02-22 RX ADMIN — DEXAMETHASONE SODIUM PHOSPHATE 10 MG: 10 INJECTION INTRAMUSCULAR; INTRAVENOUS at 23:08

## 2022-02-23 VITALS
WEIGHT: 225 LBS | BODY MASS INDEX: 36.32 KG/M2 | TEMPERATURE: 97.8 F | OXYGEN SATURATION: 97 % | DIASTOLIC BLOOD PRESSURE: 91 MMHG | SYSTOLIC BLOOD PRESSURE: 149 MMHG | RESPIRATION RATE: 18 BRPM | HEART RATE: 89 BPM

## 2022-02-23 ASSESSMENT — ENCOUNTER SYMPTOMS
NAUSEA: 0
VOMITING: 0
ABDOMINAL PAIN: 0
DIARRHEA: 0
BACK PAIN: 1
SHORTNESS OF BREATH: 0
COUGH: 0

## 2022-02-23 NOTE — ED PROVIDER NOTES
[facility] EMERGENCY DEPT  eMERGENCY dEPARTMENT eNCOUnter      Pt Name: Wendy Medrano  MRN: [id number]  [place] 1980  Date of evaluation: 2/22/2022  Provider: Maryjean Crumbly, MD Terrea Pallas       Chief Complaint   Patient presents with    Back Pain         HISTORY OF PRESENT ILLNESS   (Location/Symptom, Timing/Onset,Context/Setting, Quality, Duration, Modifying Factors, Severity)  Note limiting factors. Wendy Medrano is a 39 y.o. male who presents to the emergency department for thoracic back pain. Patient states that he slipped yesterday but did not fall down. Started having increased pain yesterday evening. Denies any radiation of the pain into his legs. No numbness or weakness. No bowel or bladder issues. Is been somewhat of a chronic problem has had x-rays in the past none since back past summer. HPI    NursingNotes were reviewed. REVIEW OF SYSTEMS    (2-9 systems for level 4, 10 or more for level 5)     Review of Systems   Constitutional: Negative for chills and fever. Respiratory: Negative for cough and shortness of breath. Cardiovascular: Negative for chest pain and leg swelling. Gastrointestinal: Negative for abdominal pain, diarrhea, nausea and vomiting. Genitourinary: Negative for difficulty urinating, dysuria and frequency. Musculoskeletal: Positive for back pain. Negative for neck pain. Neurological: Negative for weakness and numbness.             PAST MEDICALHISTORY     Past Medical History:   Diagnosis Date    Back pain     Chest pain     Scoliosis     Tobacco abuse     Vitamin D deficiency          SURGICAL HISTORY       Past Surgical History:   Procedure Laterality Date    BACK SURGERY      x 3          CURRENT MEDICATIONS     Discharge Medication List as of 2/23/2022 12:18 AM      CONTINUE these medications which have NOT CHANGED    Details   OLANZapine (ZYPREXA) 5 MG tablet Take 1/2 tab about 2 hrs before bedtime for 2 nights, then 1 tab nightly, Disp-30 tablet, R-3Normal      propranolol (INDERAL) 10 MG tablet TAKE 1 TABLET BY MOUTH TWICE DAILY FOR 5 DAYS THEN INCREASE TO 1 TABLET BY MOUTH THREE TIMES A DAY  , Disp-90 tablet, R-5Normal      gabapentin (NEURONTIN) 300 MG capsule Take 300 mg by mouth 3 times daily. Historical Med      HYDROcodone-acetaminophen (NORCO) 5-325 MG per tablet Take 1 tablet by mouth every 12 hours as needed. Historical Med      divalproex (DEPAKOTE) 250 MG DR tablet Take 1 tablet by mouth 2 times daily, Disp-180 tablet, R-3Normal      lisinopril (PRINIVIL;ZESTRIL) 10 MG tablet Take 10 mg by mouth dailyHistorical Med             ALLERGIES     Benadryl [diphenhydramine], Flexeril [cyclobenzaprine], Toradol [ketorolac tromethamine], Demerol hcl [meperidine], and Other    FAMILY HISTORY       Family History   Problem Relation Age of Onset    Heart Disease Mother     Hypertension Mother     Cancer Father         prostate          SOCIAL HISTORY       Social History     Socioeconomic History    Marital status: Single     Spouse name: Not on file    Number of children: Not on file    Years of education: Not on file    Highest education level: Not on file   Occupational History    Not on file   Tobacco Use    Smoking status: Current Every Day Smoker     Packs/day: 0.50    Smokeless tobacco: Never Used   Vaping Use    Vaping Use: Never used   Substance and Sexual Activity    Alcohol use: No    Drug use: No    Sexual activity: Not on file   Other Topics Concern    Not on file   Social History Narrative    Not on file     Social Determinants of Health     Financial Resource Strain:     Difficulty of Paying Living Expenses: Not on file   Food Insecurity:     Worried About Running Out of Food in the Last Year: Not on file    Adrienne of Food in the Last Year: Not on file   Transportation Needs:     Lack of Transportation (Medical): Not on file    Lack of Transportation (Non-Medical):  Not on file   Physical Activity:     Days of Exercise per Week: Not on file    Minutes of Exercise per Session: Not on file   Stress:     Feeling of Stress : Not on file   Social Connections:     Frequency of Communication with Friends and Family: Not on file    Frequency of Social Gatherings with Friends and Family: Not on file    Attends [employer] Services: Not on file    Active Member of [address] [place] [address] or Organizations: Not on file    Attends Club or Organization Meetings: Not on file    Marital Status: Not on file   Intimate Partner Violence:     Fear of Current or Ex-Partner: Not on file    Emotionally Abused: Not on file    Physically Abused: Not on file    Sexually Abused: Not on file   Housing Stability:     Unable to Pay for Housing in the Last Year: Not on file    Number of [place] in the Last Year: Not on file    Unstable Housing in the Last Year: Not on file       SCREENINGS             PHYSICAL EXAM    (up to 7 for level 4, 8 or more for level 5)     ED Triage Vitals [02/22/22 2205]   BP Temp Temp src Pulse Resp SpO2 Height Weight   (!) 159/101 97.8 °F (36.6 °C) -- 85 17 97 % -- 225 lb (102.1 kg)       Physical Exam  Vitals and nursing note reviewed. Constitutional:       Appearance: Normal appearance. He is well-developed. He is not ill-appearing or diaphoretic. HENT:      Head: Normocephalic and atraumatic. Eyes:      Conjunctiva/sclera: Conjunctivae normal.   Neck:      Trachea: No tracheal deviation. Cardiovascular:      Rate and Rhythm: Normal rate and regular rhythm. Pulses: Normal pulses. Heart sounds: Normal heart sounds. No murmur heard. Pulmonary:      Breath sounds: Normal breath sounds. No wheezing or rales. Abdominal:      Palpations: Abdomen is soft. There is no mass. Tenderness: There is no abdominal tenderness. Musculoskeletal:         General: Normal range of motion. Cervical back: Normal range of motion and neck supple. No bony tenderness. Thoracic back: Bony tenderness present.       Lumbar back: No bony tenderness. Back:    Skin:     General: Skin is warm and dry. Neurological:      Mental Status: He is alert and oriented to person, place, and time. Sensory: No sensory deficit. Motor: No weakness. Deep Tendon Reflexes:      Reflex Scores:       Patellar reflexes are 2+ on the right side and 2+ on the left side. Comments: No saddle anesthesia         DIAGNOSTIC RESULTS         RADIOLOGY:  Non-plain film images such as CT, Ultrasound and MRI are read by the radiologist. Plain radiographic images are visualized and preliminarily interpreted bythe emergency physician with the below findings:    [address]    (Results Pending)     PreliminaryFindingsOnly-- See Final Report For Complete Findings  CT T SPINE:  Comparison: 8/17/2021  No acute fracture or traumatic malalignment. Suspected remote fracture involving the 11th vertebral body. Post surgical hardware noted within the thoracolumbar spine. LABS:  Labs Reviewed - No data to display    All other labs were within normal range or not returned as of this dictation. EMERGENCY DEPARTMENT COURSE and DIFFERENTIAL DIAGNOSIS/MDM:   Vitals:    Vitals:    02/22/22 2205 02/23/22 0004   BP: (!) 159/101 (!) 149/91   Pulse: 85 89   Resp: 17 18   Temp: 97.8 °F (36.6 °C)    SpO2: 97%    Weight: 225 lb (102.1 kg)        MDM    Pt slipped per report but didn't truly fall, has chronic pain in this area, CT T spine stable, pt neuro intact, stable for DC and outpt follow up    CONSULTS:  None    PROCEDURES:  Unless otherwise noted below, none     Procedures    FINAL IMPRESSION      1.  Acute exacerbation of chronic low back pain          DISPOSITION/PLAN   DISPOSITION Decision To Discharge 02/22/2022 11:28:06 PM      PATIENT REFERRED TO:  Canelo Spaulding MD  [address] [place] [address]   [address] [facility] [phone]    Schedule an appointment as soon as possible for a visit in 1 week        DISCHARGE MEDICATIONS:  Discharge Medication List as of 2/23/2022 12:18 AM      START taking these medications    Details   methylPREDNISolone (MEDROL, CHUNG,) 4 MG tablet Take by mouth., Disp-1 kit, R-0Normal                (Please note that portions of this note were completed with a voice recognition program.  Efforts were made to edit thedictations but occasionally words are mis-transcribed.)    Rosey Valenzuela MD (electronically signed)  Attending Emergency Physician        Eric Velez MD  02/23/22 Mili Adams MD  02/23/22 0111

## 2022-03-07 ENCOUNTER — TELEPHONE (OUTPATIENT)
Dept: PRIMARY CARE CLINIC | Age: 42
End: 2022-03-07

## 2022-03-07 NOTE — TELEPHONE ENCOUNTER
Attempted to contact pt's wife in regards to concerns. Pt hasn't had an appt since 10/2021 and will need to be seen for any medication to be sent in. Pt's wife didn't answer. Wasn't able to leave message due to mailbox being full.

## 2022-03-07 NOTE — TELEPHONE ENCOUNTER
----- Message from Shirleykarolina Veronica sent at 3/7/2022 11:06 AM CST -----  Subject: Message to Provider    QUESTIONS  Information for Provider? Saeid John called today (3/7/22) asking if Dr. Barbara Mueller will call in an antibiotic for pt. [facility]), he has an abscess on his   teeth. Send prescription to [address]  ---------------------------------------------------------------------------  --------------  Nickolas Schmitz INFO  What is the best way for the office to contact you? OK to leave message on   voicemail  Preferred Call Back Phone Number? [phone]  ---------------------------------------------------------------------------  --------------  SCRIPT ANSWERS  Relationship to Patient? Other  Representative Name? Saeid John  Is the Representative on the appropriate HIPAA document in Epic?  Yes

## 2022-03-08 ENCOUNTER — OFFICE VISIT (OUTPATIENT)
Dept: PRIMARY CARE CLINIC | Age: 42
End: 2022-03-08
Payer: MEDICARE

## 2022-03-08 VITALS
DIASTOLIC BLOOD PRESSURE: 88 MMHG | BODY MASS INDEX: 35.2 KG/M2 | RESPIRATION RATE: 18 BRPM | HEART RATE: 83 BPM | TEMPERATURE: 97.2 F | SYSTOLIC BLOOD PRESSURE: 130 MMHG | HEIGHT: 66 IN | OXYGEN SATURATION: 97 % | WEIGHT: 219 LBS

## 2022-03-08 DIAGNOSIS — K04.7 DENTAL ABSCESS: Primary | ICD-10-CM

## 2022-03-08 DIAGNOSIS — E66.01 SEVERE OBESITY (BMI 35.0-39.9) WITH COMORBIDITY (HCC): ICD-10-CM

## 2022-03-08 PROCEDURE — 99213 OFFICE O/P EST LOW 20 MIN: CPT | Performed by: FAMILY MEDICINE

## 2022-03-08 PROCEDURE — G8427 DOCREV CUR MEDS BY ELIG CLIN: HCPCS | Performed by: FAMILY MEDICINE

## 2022-03-08 PROCEDURE — G8417 CALC BMI ABV UP PARAM F/U: HCPCS | Performed by: FAMILY MEDICINE

## 2022-03-08 PROCEDURE — G8484 FLU IMMUNIZE NO ADMIN: HCPCS | Performed by: FAMILY MEDICINE

## 2022-03-08 PROCEDURE — 4004F PT TOBACCO SCREEN RCVD TLK: CPT | Performed by: FAMILY MEDICINE

## 2022-03-08 RX ORDER — PENICILLIN V POTASSIUM 500 MG/1
500 TABLET ORAL 4 TIMES DAILY
Qty: 40 TABLET | Refills: 0 | Status: SHIPPED | OUTPATIENT
Start: 2022-03-08 | End: 2022-03-18

## 2022-03-08 RX ORDER — IBUPROFEN 800 MG/1
800 TABLET ORAL EVERY 6 HOURS PRN
Qty: 120 TABLET | Refills: 3 | Status: SHIPPED | OUTPATIENT
Start: 2022-03-08

## 2022-03-08 ASSESSMENT — PATIENT HEALTH QUESTIONNAIRE - PHQ9
SUM OF ALL RESPONSES TO PHQ9 QUESTIONS 1 & 2: 0
2. FEELING DOWN, DEPRESSED OR HOPELESS: 0
SUM OF ALL RESPONSES TO PHQ QUESTIONS 1-9: 0
SUM OF ALL RESPONSES TO PHQ QUESTIONS 1-9: 0
1. LITTLE INTEREST OR PLEASURE IN DOING THINGS: 0
SUM OF ALL RESPONSES TO PHQ QUESTIONS 1-9: 0
SUM OF ALL RESPONSES TO PHQ QUESTIONS 1-9: 0

## 2022-03-22 NOTE — PROGRESS NOTES
Maryanne Tierney is a 43 y.o. male who presents today for   Chief Complaint   Patient presents with    Other     Abscess in mouth. States it has been there for a couple days        HPI  Patient is here for dental pain. Notes it is been there a few days. Has a history of poor dentition    No change in PMH, family, social, or surgical history unless mentioned above. I have reviewed the above chief complaint and HPI details charted by staff and claim ownership of the documentation. Review of Systems   HENT: Positive for dental problem. Negative for ear discharge. Past Medical History:   Diagnosis Date    Back pain     Chest pain     Scoliosis     Tobacco abuse     Vitamin D deficiency        Current Outpatient Medications   Medication Sig Dispense Refill    ibuprofen (ADVIL;MOTRIN) 800 MG tablet Take 1 tablet by mouth every 6 hours as needed for Pain 120 tablet 3    OLANZapine (ZYPREXA) 5 MG tablet Take 1/2 tab about 2 hrs before bedtime for 2 nights, then 1 tab nightly 30 tablet 3    propranolol (INDERAL) 10 MG tablet TAKE 1 TABLET BY MOUTH TWICE DAILY FOR 5 DAYS THEN INCREASE TO 1 TABLET BY MOUTH THREE TIMES A DAY   90 tablet 5    gabapentin (NEURONTIN) 300 MG capsule Take 300 mg by mouth 3 times daily.  HYDROcodone-acetaminophen (NORCO) 5-325 MG per tablet Take 1 tablet by mouth every 12 hours as needed.  divalproex (DEPAKOTE) 250 MG DR tablet Take 1 tablet by mouth 2 times daily 180 tablet 3    lisinopril (PRINIVIL;ZESTRIL) 10 MG tablet Take 10 mg by mouth daily      methylPREDNISolone (MEDROL, CHUNG,) 4 MG tablet Take by mouth. (Patient not taking: Reported on 3/8/2022) 1 kit 0     No current facility-administered medications for this visit.        Allergies   Allergen Reactions    Benadryl [Diphenhydramine] Itching    Flexeril [Cyclobenzaprine] Itching    Toradol [Ketorolac Tromethamine] Itching     itching    Demerol Hcl [Meperidine] Rash    Other Rash     darvocet       Past Surgical History:   Procedure Laterality Date    BACK SURGERY      x 3        Social History     Tobacco Use    Smoking status: Current Every Day Smoker     Packs/day: 0.50    Smokeless tobacco: Never Used   Vaping Use    Vaping Use: Never used   Substance Use Topics    Alcohol use: No    Drug use: No       Family History   Problem Relation Age of Onset    Heart Disease Mother     Hypertension Mother     Cancer Father         prostate       /88 (Site: Right Upper Arm, Position: Sitting)   Pulse 83   Temp 97.2 °F (36.2 °C) (Temporal)   Resp 18   Ht 5' 6\" (1.676 m)   Wt 219 lb (99.3 kg)   SpO2 97%   BMI 35.35 kg/m²     Physical Exam  Vitals and nursing note reviewed. Constitutional:       General: He is not in acute distress. Appearance: He is well-developed. He is not toxic-appearing or diaphoretic. HENT:      Head: Normocephalic and atraumatic. Mouth/Throat:      Dentition: Abnormal dentition. Does not have dentures. Dental abscesses present. No gingival swelling or gum lesions. Cardiovascular:      Rate and Rhythm: Normal rate and regular rhythm. Heart sounds: Normal heart sounds. No murmur heard. No friction rub. No gallop. Pulmonary:      Effort: Pulmonary effort is normal. No respiratory distress. Breath sounds: Normal breath sounds. No wheezing or rales. Chest:      Chest wall: No tenderness. Abdominal:      General: Bowel sounds are normal. There is no distension. Palpations: Abdomen is soft. There is no mass. Tenderness: There is no abdominal tenderness. There is no guarding or rebound. Musculoskeletal:      Right lower leg: No edema. Left lower leg: No edema. Skin:     General: Skin is warm and dry. Nails: There is no clubbing. Neurological:      Mental Status: He is alert and oriented to person, place, and time. Coordination: Coordination normal.      Gait: Gait normal.         Assessment:    ICD-10-CM    1.  Dental abscess  K04.7    2. Severe obesity (BMI 35.0-39. 9) with comorbidity ([facility] [address]  E66.01        Plan:   Patient unfortunately suffering, start acute treatment. No orders of the defined types were placed in this encounter. Orders Placed This Encounter   Medications    ibuprofen (ADVIL;MOTRIN) 800 MG tablet     Sig: Take 1 tablet by mouth every 6 hours as needed for Pain     Dispense:  120 tablet     Refill:  3    penicillin v potassium (VEETID) 500 MG tablet     Sig: Take 1 tablet by mouth 4 times daily for 10 days     Dispense:  40 tablet     Refill:  0     There are no discontinued medications. There are no Patient Instructions on file for this visit. Patient given educational handouts and has had all questions answered. Patient voices understanding and agrees to plans along with risks and benefits of plan. Patient isinstructed to continue prior meds, diet, and exercise plans unless instructed otherwise. Patient agrees to follow up as instructed and sooner if needed. Patient agrees to go to ER if condition becomes emergent. Notesmay be completed with dictation device and spelling errors may occur. Materials may be copied and pasted from a notepad outside of EMR, all of which, I, Dr. Claudean Malta, MD, take sole intellectual ownership of and have approved adding to my note. Return if symptoms worsen or fail to improve.

## 2022-04-07 ENCOUNTER — OFFICE VISIT (OUTPATIENT)
Dept: PRIMARY CARE CLINIC | Age: 42
End: 2022-04-07
Payer: MEDICARE

## 2022-04-07 VITALS
RESPIRATION RATE: 18 BRPM | SYSTOLIC BLOOD PRESSURE: 160 MMHG | TEMPERATURE: 97.2 F | BODY MASS INDEX: 37.28 KG/M2 | HEART RATE: 112 BPM | DIASTOLIC BLOOD PRESSURE: 108 MMHG | WEIGHT: 232 LBS | HEIGHT: 66 IN | OXYGEN SATURATION: 97 %

## 2022-04-07 DIAGNOSIS — R60.0 BILATERAL LEG EDEMA: ICD-10-CM

## 2022-04-07 DIAGNOSIS — R60.0 BILATERAL LEG EDEMA: Primary | ICD-10-CM

## 2022-04-07 LAB
ALBUMIN SERPL-MCNC: 4.5 G/DL (ref 3.5–5.2)
ALP BLD-CCNC: 164 U/L (ref 40–130)
ALT SERPL-CCNC: 76 U/L (ref 5–41)
ANION GAP SERPL CALCULATED.3IONS-SCNC: 12 MMOL/L (ref 7–19)
AST SERPL-CCNC: 47 U/L (ref 5–40)
BILIRUB SERPL-MCNC: 0.3 MG/DL (ref 0.2–1.2)
BUN BLDV-MCNC: 13 MG/DL (ref 6–20)
CALCIUM SERPL-MCNC: 9.5 MG/DL (ref 8.6–10)
CHLORIDE BLD-SCNC: 102 MMOL/L (ref 98–111)
CO2: 27 MMOL/L (ref 22–29)
CREAT SERPL-MCNC: 0.8 MG/DL (ref 0.5–1.2)
GFR AFRICAN AMERICAN: >59
GFR NON-AFRICAN AMERICAN: >60
GLUCOSE BLD-MCNC: 98 MG/DL (ref 74–109)
POTASSIUM SERPL-SCNC: 4.7 MMOL/L (ref 3.5–5)
PRO-BNP: 34 PG/ML (ref 0–450)
SODIUM BLD-SCNC: 141 MMOL/L (ref 136–145)
TOTAL PROTEIN: 6.4 G/DL (ref 6.6–8.7)
TSH REFLEX FT4: 2.1 UIU/ML (ref 0.35–5.5)

## 2022-04-07 PROCEDURE — G8417 CALC BMI ABV UP PARAM F/U: HCPCS | Performed by: FAMILY MEDICINE

## 2022-04-07 PROCEDURE — 4004F PT TOBACCO SCREEN RCVD TLK: CPT | Performed by: FAMILY MEDICINE

## 2022-04-07 PROCEDURE — 99213 OFFICE O/P EST LOW 20 MIN: CPT | Performed by: FAMILY MEDICINE

## 2022-04-07 PROCEDURE — G8427 DOCREV CUR MEDS BY ELIG CLIN: HCPCS | Performed by: FAMILY MEDICINE

## 2022-04-07 RX ORDER — FUROSEMIDE 20 MG/1
20 TABLET ORAL DAILY
Qty: 15 TABLET | Refills: 0 | Status: SHIPPED | OUTPATIENT
Start: 2022-04-07

## 2022-04-07 RX ORDER — POTASSIUM CHLORIDE 20 MEQ/1
10 TABLET, EXTENDED RELEASE ORAL DAILY
Qty: 15 TABLET | Refills: 0 | Status: SHIPPED | OUTPATIENT
Start: 2022-04-07

## 2022-04-07 ASSESSMENT — ENCOUNTER SYMPTOMS
VOMITING: 0
SHORTNESS OF BREATH: 0
CONSTIPATION: 0
CHEST TIGHTNESS: 0
ABDOMINAL PAIN: 0
NAUSEA: 0
WHEEZING: 0
DIARRHEA: 0
COUGH: 0

## 2022-04-07 ASSESSMENT — PATIENT HEALTH QUESTIONNAIRE - PHQ9
SUM OF ALL RESPONSES TO PHQ QUESTIONS 1-9: 0
SUM OF ALL RESPONSES TO PHQ QUESTIONS 1-9: 0
2. FEELING DOWN, DEPRESSED OR HOPELESS: 0
SUM OF ALL RESPONSES TO PHQ9 QUESTIONS 1 & 2: 0
SUM OF ALL RESPONSES TO PHQ QUESTIONS 1-9: 0
1. LITTLE INTEREST OR PLEASURE IN DOING THINGS: 0
SUM OF ALL RESPONSES TO PHQ QUESTIONS 1-9: 0

## 2022-04-07 NOTE — PROGRESS NOTES
[address] [facility]  [address] [place] [address]  [address] [address]  Dept: [phone]  Dept Fax: [phone]: [phone]      Subjective:     Chief Complaint   Patient presents with    Swelling     Ankles, legs, and feet. States it has been ongoing for 4-5 days        HPI:  Irma Kelly is a 43 y.o. male presents today for evaluation of swelling in both feet x 3-4 days. No hx known hx of heart disease. He does have a hx of meth use       ROS:   Review of Systems   Constitutional: Negative for chills and fever. HENT: Negative. Respiratory: Negative for cough, chest tightness, shortness of breath and wheezing. Cardiovascular: Positive for leg swelling. Negative for chest pain and palpitations. Gastrointestinal: Negative for abdominal pain, constipation, diarrhea, nausea and vomiting. Genitourinary: Negative for difficulty urinating, dysuria and frequency. Musculoskeletal: Positive for arthralgias and gait problem. PMHx:  Past Medical History:   Diagnosis Date    Back pain     Chest pain     Scoliosis     Tobacco abuse     Vitamin D deficiency      Patient Active Problem List   Diagnosis    Smoker    Family history of coronary artery disease in mother   Ardyth Moritz Essential hypertension    Chest pressure    Suicidal ideations    Nicotine dependence    Bipolar depression ([facility] [address]    Chest pain    Mixed hyperlipidemia    Methamphetamine abuse in remission ([facility] [address]       PSHx:  Past Surgical History:   Procedure Laterality Date    BACK SURGERY      x 3        PFHx:  Family History   Problem Relation Age of Onset    Heart Disease Mother     Hypertension Mother     Cancer Father         prostate       SocialHx:  Social History     Tobacco Use    Smoking status: Current Every Day Smoker     Packs/day: 0.50    Smokeless tobacco: Never Used   Substance Use Topics    Alcohol use: No       Allergies:   Allergies   Allergen Reactions    Benadryl

## 2022-07-07 ENCOUNTER — TELEPHONE (OUTPATIENT)
Dept: PODIATRY | Facility: CLINIC | Age: 42
End: 2022-07-07

## 2022-07-07 NOTE — TELEPHONE ENCOUNTER
Attempted to reschedule noshow appt from 06/08/22 with Dr. Feng.  If pt calls back please reschedule at that time.

## 2023-11-01 ENCOUNTER — OFFICE VISIT (OUTPATIENT)
Dept: PRIMARY CARE CLINIC | Age: 43
End: 2023-11-01
Payer: MEDICARE

## 2023-11-01 VITALS
DIASTOLIC BLOOD PRESSURE: 88 MMHG | OXYGEN SATURATION: 97 % | HEART RATE: 111 BPM | SYSTOLIC BLOOD PRESSURE: 128 MMHG | HEIGHT: 66 IN | WEIGHT: 209.4 LBS | BODY MASS INDEX: 33.65 KG/M2 | TEMPERATURE: 97.6 F

## 2023-11-01 DIAGNOSIS — Z76.0 MEDICATION REFILL: ICD-10-CM

## 2023-11-01 DIAGNOSIS — F43.10 PTSD (POST-TRAUMATIC STRESS DISORDER): Primary | ICD-10-CM

## 2023-11-01 PROBLEM — F15.11 METHAMPHETAMINE ABUSE IN REMISSION (HCC): Status: RESOLVED | Noted: 2019-07-30 | Resolved: 2023-11-01

## 2023-11-01 PROCEDURE — 99213 OFFICE O/P EST LOW 20 MIN: CPT | Performed by: FAMILY MEDICINE

## 2023-11-01 PROCEDURE — 3074F SYST BP LT 130 MM HG: CPT | Performed by: FAMILY MEDICINE

## 2023-11-01 PROCEDURE — 3078F DIAST BP <80 MM HG: CPT | Performed by: FAMILY MEDICINE

## 2023-11-01 PROCEDURE — G8427 DOCREV CUR MEDS BY ELIG CLIN: HCPCS | Performed by: FAMILY MEDICINE

## 2023-11-01 PROCEDURE — G8417 CALC BMI ABV UP PARAM F/U: HCPCS | Performed by: FAMILY MEDICINE

## 2023-11-01 PROCEDURE — G8484 FLU IMMUNIZE NO ADMIN: HCPCS | Performed by: FAMILY MEDICINE

## 2023-11-01 PROCEDURE — 4004F PT TOBACCO SCREEN RCVD TLK: CPT | Performed by: FAMILY MEDICINE

## 2023-11-01 RX ORDER — PRAZOSIN HYDROCHLORIDE 2 MG/1
2 CAPSULE ORAL NIGHTLY
Qty: 30 CAPSULE | Refills: 1 | Status: SHIPPED | OUTPATIENT
Start: 2023-11-01

## 2023-11-01 SDOH — ECONOMIC STABILITY: FOOD INSECURITY: WITHIN THE PAST 12 MONTHS, YOU WORRIED THAT YOUR FOOD WOULD RUN OUT BEFORE YOU GOT MONEY TO BUY MORE.: NEVER TRUE

## 2023-11-01 SDOH — ECONOMIC STABILITY: INCOME INSECURITY: HOW HARD IS IT FOR YOU TO PAY FOR THE VERY BASICS LIKE FOOD, HOUSING, MEDICAL CARE, AND HEATING?: NOT HARD AT ALL

## 2023-11-01 SDOH — ECONOMIC STABILITY: FOOD INSECURITY: WITHIN THE PAST 12 MONTHS, THE FOOD YOU BOUGHT JUST DIDN'T LAST AND YOU DIDN'T HAVE MONEY TO GET MORE.: NEVER TRUE

## 2023-11-01 SDOH — ECONOMIC STABILITY: HOUSING INSECURITY
IN THE LAST 12 MONTHS, WAS THERE A TIME WHEN YOU DID NOT HAVE A STEADY PLACE TO SLEEP OR SLEPT IN A SHELTER (INCLUDING NOW)?: NO

## 2023-11-01 ASSESSMENT — PATIENT HEALTH QUESTIONNAIRE - PHQ9
2. FEELING DOWN, DEPRESSED OR HOPELESS: 0
SUM OF ALL RESPONSES TO PHQ QUESTIONS 1-9: 0
SUM OF ALL RESPONSES TO PHQ QUESTIONS 1-9: 0
5. POOR APPETITE OR OVEREATING: 0
1. LITTLE INTEREST OR PLEASURE IN DOING THINGS: 0
6. FEELING BAD ABOUT YOURSELF - OR THAT YOU ARE A FAILURE OR HAVE LET YOURSELF OR YOUR FAMILY DOWN: 0
SUM OF ALL RESPONSES TO PHQ QUESTIONS 1-9: 0
7. TROUBLE CONCENTRATING ON THINGS, SUCH AS READING THE NEWSPAPER OR WATCHING TELEVISION: 0
9. THOUGHTS THAT YOU WOULD BE BETTER OFF DEAD, OR OF HURTING YOURSELF: 0
3. TROUBLE FALLING OR STAYING ASLEEP: 0
8. MOVING OR SPEAKING SO SLOWLY THAT OTHER PEOPLE COULD HAVE NOTICED. OR THE OPPOSITE, BEING SO FIGETY OR RESTLESS THAT YOU HAVE BEEN MOVING AROUND A LOT MORE THAN USUAL: 0
SUM OF ALL RESPONSES TO PHQ QUESTIONS 1-9: 0
10. IF YOU CHECKED OFF ANY PROBLEMS, HOW DIFFICULT HAVE THESE PROBLEMS MADE IT FOR YOU TO DO YOUR WORK, TAKE CARE OF THINGS AT HOME, OR GET ALONG WITH OTHER PEOPLE: 0
4. FEELING TIRED OR HAVING LITTLE ENERGY: 0
SUM OF ALL RESPONSES TO PHQ9 QUESTIONS 1 & 2: 0

## 2023-11-01 ASSESSMENT — COLUMBIA-SUICIDE SEVERITY RATING SCALE - C-SSRS
7. DID THIS OCCUR IN THE LAST THREE MONTHS: NO
5. HAVE YOU STARTED TO WORK OUT OR WORKED OUT THE DETAILS OF HOW TO KILL YOURSELF? DO YOU INTEND TO CARRY OUT THIS PLAN?: NO
3. HAVE YOU BEEN THINKING ABOUT HOW YOU MIGHT KILL YOURSELF?: NO
4. HAVE YOU HAD THESE THOUGHTS AND HAD SOME INTENTION OF ACTING ON THEM?: NO

## 2023-11-02 ASSESSMENT — ENCOUNTER SYMPTOMS
RESPIRATORY NEGATIVE: 1
GASTROINTESTINAL NEGATIVE: 1